# Patient Record
Sex: MALE | Race: WHITE | Employment: OTHER | URBAN - METROPOLITAN AREA
[De-identification: names, ages, dates, MRNs, and addresses within clinical notes are randomized per-mention and may not be internally consistent; named-entity substitution may affect disease eponyms.]

---

## 2019-07-24 ENCOUNTER — APPOINTMENT (OUTPATIENT)
Dept: CT IMAGING | Age: 59
End: 2019-07-24
Payer: MEDICAID

## 2019-07-24 ENCOUNTER — APPOINTMENT (OUTPATIENT)
Dept: GENERAL RADIOLOGY | Age: 59
End: 2019-07-24
Payer: MEDICAID

## 2019-07-24 ENCOUNTER — APPOINTMENT (OUTPATIENT)
Dept: MRI IMAGING | Age: 59
End: 2019-07-24
Payer: MEDICAID

## 2019-07-24 ENCOUNTER — HOSPITAL ENCOUNTER (EMERGENCY)
Age: 59
Discharge: HOME | End: 2019-07-24
Payer: MEDICAID

## 2019-07-24 VITALS
TEMPERATURE: 98.5 F | HEIGHT: 64 IN | WEIGHT: 183 LBS | BODY MASS INDEX: 31.24 KG/M2 | DIASTOLIC BLOOD PRESSURE: 76 MMHG | SYSTOLIC BLOOD PRESSURE: 115 MMHG | OXYGEN SATURATION: 94 % | RESPIRATION RATE: 18 BRPM | HEART RATE: 69 BPM
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
## 2019-07-24 DIAGNOSIS — S01.81XA FACIAL LACERATION, INITIAL ENCOUNTER: ICD-10-CM

## 2019-07-24 DIAGNOSIS — R93.0 ABNORMAL MRA, HEAD: ICD-10-CM

## 2019-07-24 DIAGNOSIS — M79.601 ARM PAIN, RIGHT: ICD-10-CM

## 2019-07-24 DIAGNOSIS — J34.89 MASS OF SINUS: ICD-10-CM

## 2019-07-24 LAB
ANION GAP SERPL CALCULATED.3IONS-SCNC: 9 MMOL/L (ref 4–16)
BASOPHILS ABSOLUTE: 0.1 K/CU MM
BASOPHILS RELATIVE PERCENT: 0.7 % (ref 0–1)
BUN BLDV-MCNC: 12 MG/DL (ref 6–23)
CALCIUM SERPL-MCNC: 8.9 MG/DL (ref 8.3–10.6)
CHLORIDE BLD-SCNC: 104 MMOL/L (ref 99–110)
CO2: 25 MMOL/L (ref 21–32)
CREAT SERPL-MCNC: 0.9 MG/DL (ref 0.9–1.3)
DIFFERENTIAL TYPE: ABNORMAL
EOSINOPHILS ABSOLUTE: 0.1 K/CU MM
EOSINOPHILS RELATIVE PERCENT: 1 % (ref 0–3)
GFR AFRICAN AMERICAN: >60 ML/MIN/1.73M2
GFR NON-AFRICAN AMERICAN: >60 ML/MIN/1.73M2
GLUCOSE BLD-MCNC: 106 MG/DL (ref 70–99)
HCT VFR BLD CALC: 41.2 % (ref 42–52)
HEMOGLOBIN: 13.7 GM/DL (ref 13.5–18)
IMMATURE NEUTROPHIL %: 0.3 % (ref 0–0.43)
LYMPHOCYTES ABSOLUTE: 1.4 K/CU MM
LYMPHOCYTES RELATIVE PERCENT: 15.9 % (ref 24–44)
MCH RBC QN AUTO: 29.8 PG (ref 27–31)
MCHC RBC AUTO-ENTMCNC: 33.3 % (ref 32–36)
MCV RBC AUTO: 89.6 FL (ref 78–100)
MONOCYTES ABSOLUTE: 0.6 K/CU MM
MONOCYTES RELATIVE PERCENT: 6.3 % (ref 0–4)
NUCLEATED RBC %: 0 %
PDW BLD-RTO: 14.4 % (ref 11.7–14.9)
PLATELET # BLD: 274 K/CU MM (ref 140–440)
PMV BLD AUTO: 10.8 FL (ref 7.5–11.1)
POTASSIUM SERPL-SCNC: 4.3 MMOL/L (ref 3.5–5.1)
RBC # BLD: 4.6 M/CU MM (ref 4.6–6.2)
SEGMENTED NEUTROPHILS ABSOLUTE COUNT: 6.8 K/CU MM
SEGMENTED NEUTROPHILS RELATIVE PERCENT: 75.8 % (ref 36–66)
SODIUM BLD-SCNC: 138 MMOL/L (ref 135–145)
TOTAL IMMATURE NEUTOROPHIL: 0.03 K/CU MM
TOTAL NUCLEATED RBC: 0 K/CU MM
WBC # BLD: 9 K/CU MM (ref 4–10.5)

## 2019-07-24 PROCEDURE — 73501 X-RAY EXAM HIP UNI 1 VIEW: CPT

## 2019-07-24 PROCEDURE — 36415 COLL VENOUS BLD VENIPUNCTURE: CPT

## 2019-07-24 PROCEDURE — 73060 X-RAY EXAM OF HUMERUS: CPT

## 2019-07-24 PROCEDURE — 85025 COMPLETE CBC W/AUTO DIFF WBC: CPT

## 2019-07-24 PROCEDURE — 70553 MRI BRAIN STEM W/O & W/DYE: CPT

## 2019-07-24 PROCEDURE — 6360000004 HC RX CONTRAST MEDICATION: Performed by: PHYSICIAN ASSISTANT

## 2019-07-24 PROCEDURE — 70450 CT HEAD/BRAIN W/O DYE: CPT

## 2019-07-24 PROCEDURE — 70544 MR ANGIOGRAPHY HEAD W/O DYE: CPT

## 2019-07-24 PROCEDURE — 99284 EMERGENCY DEPT VISIT MOD MDM: CPT

## 2019-07-24 PROCEDURE — 73080 X-RAY EXAM OF ELBOW: CPT

## 2019-07-24 PROCEDURE — 72125 CT NECK SPINE W/O DYE: CPT

## 2019-07-24 PROCEDURE — 80048 BASIC METABOLIC PNL TOTAL CA: CPT

## 2019-07-24 PROCEDURE — 6370000000 HC RX 637 (ALT 250 FOR IP): Performed by: PHYSICIAN ASSISTANT

## 2019-07-24 PROCEDURE — A9579 GAD-BASE MR CONTRAST NOS,1ML: HCPCS | Performed by: PHYSICIAN ASSISTANT

## 2019-07-24 RX ORDER — ACETAMINOPHEN 500 MG
1000 TABLET ORAL ONCE
Status: COMPLETED | OUTPATIENT
Start: 2019-07-24 | End: 2019-07-24

## 2019-07-24 RX ADMIN — ACETAMINOPHEN 1000 MG: 500 TABLET ORAL at 09:40

## 2019-07-24 RX ADMIN — GADOTERIDOL 16 ML: 279.3 INJECTION, SOLUTION INTRAVENOUS at 12:25

## 2019-07-24 ASSESSMENT — PAIN DESCRIPTION - LOCATION: LOCATION: HIP;ARM

## 2019-07-24 ASSESSMENT — PAIN DESCRIPTION - PAIN TYPE: TYPE: ACUTE PAIN

## 2019-07-24 ASSESSMENT — PAIN SCALES - GENERAL
PAINLEVEL_OUTOF10: 4
PAINLEVEL_OUTOF10: 3
PAINLEVEL_OUTOF10: 3

## 2019-07-24 ASSESSMENT — PAIN DESCRIPTION - ORIENTATION: ORIENTATION: RIGHT

## 2019-07-24 NOTE — ED TRIAGE NOTES
Patient to ER today after slipping out of his bed onto the floor. Patient had a previous stroke roughly a year ago, leaving him with left side deficits. Patient states he does not ambulate. Patient c/o new right upper arm pain, right hip pain, and worsening left ankle pain. Reports hitting his head, but denies loc or blood thinner usage. Patient is A/Ox4.  Resides at [address] [facility]. facility

## 2019-07-24 NOTE — ED PROVIDER NOTES
EMERGENCY DEPARTMENT ENCOUNTER      PCP: No primary care provider on file. CHIEF COMPLAINT    Chief Complaint   Patient presents with   Mercedes Chadwick     states slipped off his bed, hitting head. denies loc or blood thinner use. reports right upper arm pain, right hip pain, and left ankle pain       HPI    Mery Laura is a 60 y.o. male who presents via EMS after sustaining a fall from bed from his long-term care facility. Patient states that he slipped off his bed causing him to hit his head on a side table, does have a small superficial abrasion/laceration to the forehead. Denies being knocked unconscious. No altered mental status or confusion. Not on blood thinning medication. Denies neck pain. No other blood from the nose ears or mouth. No visual disturbances. No neck pain. His chief complaint is right upper arm pain into the elbow area. No chest wall pain, denies any preceding symptoms. Denies abdominal pain. No pelvic pain. No pain to the lower extremity. Rates pain 1/10. Alert and oriented. REVIEW OF SYSTEMS    General: No Fever  ENT:  No visual changes. No headache. Cardiac: No Chest Pain, no syncope  Respiratory: No cough or difficulty breathing  GI: No vomiting. No Bloody Stool or Diarrhea  : No Dysuria or Hematuria  MSKTL:  See HPI. Skin:  Denies rash  Neurologic:  Denies headache, focal weakness or sensory changes   Endocrine:  Denies polyuria or polydypsia   Lymphatic:  Denies swollen glands   See HPI and nursing notes for additional information     PAST MEDICAL & SURGICAL HISTORY    Past Medical History:   Diagnosis Date    Cerebral artery occlusion with cerebral infarction [facility])      History reviewed. No pertinent surgical history.     CURRENT MEDICATIONS        ALLERGIES    No Known Allergies    SOCIAL & FAMILY HISTORY    Social History     Socioeconomic History    Marital status:      Spouse name: None    Number of children: None    Years of education: None    Elbow Right (min 3 Views)    Result Date: 7/24/2019  EXAMINATION: TWO XRAY VIEWS OF THE RIGHT HUMERUS; 3 XRAY VIEWS OF THE RIGHT ELBOW 7/24/2019 9:48 am COMPARISON: None. HISTORY: ORDERING SYSTEM PROVIDED HISTORY: right arm pain TECHNOLOGIST PROVIDED HISTORY: Reason for exam:->right arm pain Reason for Exam: right humerus pain Acuity: Acute Type of Exam: Initial Mechanism of Injury: fell out of bed; ORDERING SYSTEM PROVIDED HISTORY: right elbow pain from fall TECHNOLOGIST PROVIDED HISTORY: Reason for exam:->right elbow pain from fall Reason for Exam: right elbow pain Acuity: Acute Type of Exam: Initial Mechanism of Injury: fell out of bed FINDINGS: No acute fracture or subluxation. The humerus is intact. No evidence of an elbow joint effusion. No acute osseous abnormality. Ct Cervical Spine Wo Contrast    Result Date: 7/24/2019  EXAMINATION: CT OF THE CERVICAL SPINE WITHOUT CONTRAST 7/24/2019 9:47 am TECHNIQUE: CT of the cervical spine was performed without the administration of intravenous contrast. Multiplanar reformatted images are provided for review. Dose modulation, iterative reconstruction, and/or weight based adjustment of the mA/kV was utilized to reduce the radiation dose to as low as reasonably achievable. COMPARISON: CT brain without contrast 07/24/2019 HISTORY: ORDERING SYSTEM PROVIDED HISTORY: fall from bed TECHNOLOGIST PROVIDED HISTORY: Reason for Exam: fall from bed Acuity: Acute Type of Exam: Initial FINDINGS: BONES/ALIGNMENT: Decreased bone mineral density. Normal lordotic curvature and alignment. The T1 and T2 vertebral bodies demonstrate mild anterior wedge configuration with no significant posterior cortical displacement or vertebral body sclerosis. The cervical vertebral bodies demonstrate normal configuration. There is multilevel degenerative disc and joint disease. No acute cervical spine fracture or acute fracture margins of the T1 and T2 bodies.  SOFT TISSUES: The visualized

## 2019-07-24 NOTE — ED NOTES
Discharge instructions reviewed with patient. Medications and follow up were discussed. Patient denies further questions and verbalizes understanding.  Setting up transport back to [facility]  07/24/19 1802

## 2024-01-06 ENCOUNTER — APPOINTMENT (OUTPATIENT)
Dept: GENERAL RADIOLOGY | Age: 64
End: 2024-01-06
Payer: COMMERCIAL

## 2024-01-06 ENCOUNTER — APPOINTMENT (OUTPATIENT)
Dept: CT IMAGING | Age: 64
End: 2024-01-06
Payer: COMMERCIAL

## 2024-01-06 ENCOUNTER — HOSPITAL ENCOUNTER (INPATIENT)
Age: 64
LOS: 13 days | Discharge: NURSING, REHAB OR LONG-TERM CARE | End: 2024-01-19
Attending: STUDENT IN AN ORGANIZED HEALTH CARE EDUCATION/TRAINING PROGRAM | Admitting: STUDENT IN AN ORGANIZED HEALTH CARE EDUCATION/TRAINING PROGRAM
Payer: COMMERCIAL

## 2024-01-06 DIAGNOSIS — N30.01 ACUTE CYSTITIS WITH HEMATURIA: ICD-10-CM

## 2024-01-06 DIAGNOSIS — A41.9 SEPTICEMIA (HCC): Primary | ICD-10-CM

## 2024-01-06 DIAGNOSIS — G89.3 TUMOR ASSOCIATED PAIN: ICD-10-CM

## 2024-01-06 DIAGNOSIS — R53.1 GENERALIZED WEAKNESS: ICD-10-CM

## 2024-01-06 DIAGNOSIS — C79.9 METASTATIC MALIGNANT NEOPLASM, UNSPECIFIED SITE (HCC): ICD-10-CM

## 2024-01-06 DIAGNOSIS — C80.1 NEOPLASM /CANCER (HCC): ICD-10-CM

## 2024-01-06 DIAGNOSIS — R07.9 CHEST PAIN, UNSPECIFIED TYPE: ICD-10-CM

## 2024-01-06 LAB
ALBUMIN SERPL-MCNC: 3 GM/DL (ref 3.4–5)
ALP BLD-CCNC: 110 IU/L (ref 40–128)
ALT SERPL-CCNC: 12 U/L (ref 10–40)
ANION GAP SERPL CALCULATED.3IONS-SCNC: 12 MMOL/L (ref 7–16)
AST SERPL-CCNC: 21 IU/L (ref 15–37)
BACTERIA: NEGATIVE /HPF
BASOPHILS ABSOLUTE: 0 K/CU MM
BASOPHILS RELATIVE PERCENT: 0.3 % (ref 0–1)
BILIRUB SERPL-MCNC: 0.4 MG/DL (ref 0–1)
BILIRUBIN URINE: ABNORMAL MG/DL
BLOOD, URINE: ABNORMAL
BUN SERPL-MCNC: 15 MG/DL (ref 6–23)
CALCIUM SERPL-MCNC: 9.1 MG/DL (ref 8.3–10.6)
CHLORIDE BLD-SCNC: 101 MMOL/L (ref 99–110)
CLARITY: CLEAR
CO2: 26 MMOL/L (ref 21–32)
COLOR: YELLOW
CREAT SERPL-MCNC: 0.5 MG/DL (ref 0.9–1.3)
DIFFERENTIAL TYPE: ABNORMAL
EKG ATRIAL RATE: 121 BPM
EKG DIAGNOSIS: NORMAL
EKG P AXIS: 52 DEGREES
EKG P-R INTERVAL: 144 MS
EKG Q-T INTERVAL: 336 MS
EKG QRS DURATION: 80 MS
EKG QTC CALCULATION (BAZETT): 477 MS
EKG R AXIS: -5 DEGREES
EKG T AXIS: 55 DEGREES
EKG VENTRICULAR RATE: 121 BPM
EOSINOPHILS ABSOLUTE: 0 K/CU MM
EOSINOPHILS RELATIVE PERCENT: 0.1 % (ref 0–3)
GFR SERPL CREATININE-BSD FRML MDRD: >60 ML/MIN/1.73M2
GLUCOSE SERPL-MCNC: 113 MG/DL (ref 70–99)
GLUCOSE, URINE: NEGATIVE MG/DL
HCT VFR BLD CALC: 33.1 % (ref 42–52)
HCT VFR BLD CALC: 39 % (ref 42–52)
HEMOGLOBIN: 10.3 GM/DL (ref 13.5–18)
HEMOGLOBIN: 12.3 GM/DL (ref 13.5–18)
IMMATURE NEUTROPHIL %: 0.3 % (ref 0–0.43)
KETONES, URINE: ABNORMAL MG/DL
LACTIC ACID, SEPSIS: 1.2 MMOL/L (ref 0.4–2)
LACTIC ACID, SEPSIS: 1.6 MMOL/L (ref 0.4–2)
LEUKOCYTE ESTERASE, URINE: ABNORMAL
LIPASE: 15 IU/L (ref 13–60)
LYMPHOCYTES ABSOLUTE: 1.7 K/CU MM
LYMPHOCYTES RELATIVE PERCENT: 10.7 % (ref 24–44)
MAGNESIUM: 2.1 MG/DL (ref 1.8–2.4)
MCH RBC QN AUTO: 27.8 PG (ref 27–31)
MCHC RBC AUTO-ENTMCNC: 31.5 % (ref 32–36)
MCV RBC AUTO: 88.2 FL (ref 78–100)
MONOCYTES ABSOLUTE: 1 K/CU MM
MONOCYTES RELATIVE PERCENT: 6.3 % (ref 0–4)
MUCUS: ABNORMAL HPF
NITRITE URINE, QUANTITATIVE: NEGATIVE
NUCLEATED RBC %: 0 %
PDW BLD-RTO: 15.3 % (ref 11.7–14.9)
PH, URINE: 5.5 (ref 5–8)
PLATELET # BLD: 523 K/CU MM (ref 140–440)
PMV BLD AUTO: 10.2 FL (ref 7.5–11.1)
POTASSIUM SERPL-SCNC: 3.8 MMOL/L (ref 3.5–5.1)
PRO-BNP: 50.36 PG/ML
PROTEIN UA: ABNORMAL MG/DL
RBC # BLD: 4.42 M/CU MM (ref 4.6–6.2)
RBC URINE: 8 /HPF (ref 0–3)
SARS-COV-2 RDRP RESP QL NAA+PROBE: NOT DETECTED
SEGMENTED NEUTROPHILS ABSOLUTE COUNT: 13.1 K/CU MM
SEGMENTED NEUTROPHILS RELATIVE PERCENT: 82.3 % (ref 36–66)
SODIUM BLD-SCNC: 139 MMOL/L (ref 135–145)
SOURCE: NORMAL
SPECIFIC GRAVITY UA: 1.02 (ref 1–1.03)
TOTAL IMMATURE NEUTOROPHIL: 0.05 K/CU MM
TOTAL NUCLEATED RBC: 0 K/CU MM
TOTAL PROTEIN: 7.3 GM/DL (ref 6.4–8.2)
TRICHOMONAS: ABNORMAL /HPF
TROPONIN, HIGH SENSITIVITY: 16 NG/L (ref 0–22)
TROPONIN, HIGH SENSITIVITY: 24 NG/L (ref 0–22)
UROBILINOGEN, URINE: 0.2 MG/DL (ref 0.2–1)
WBC # BLD: 15.9 K/CU MM (ref 4–10.5)
WBC UA: 37 /HPF (ref 0–2)

## 2024-01-06 PROCEDURE — 71045 X-RAY EXAM CHEST 1 VIEW: CPT

## 2024-01-06 PROCEDURE — 2580000003 HC RX 258: Performed by: NURSE PRACTITIONER

## 2024-01-06 PROCEDURE — 81001 URINALYSIS AUTO W/SCOPE: CPT

## 2024-01-06 PROCEDURE — 83605 ASSAY OF LACTIC ACID: CPT

## 2024-01-06 PROCEDURE — 6360000002 HC RX W HCPCS: Performed by: NURSE PRACTITIONER

## 2024-01-06 PROCEDURE — 1200000000 HC SEMI PRIVATE

## 2024-01-06 PROCEDURE — 6360000004 HC RX CONTRAST MEDICATION: Performed by: NURSE PRACTITIONER

## 2024-01-06 PROCEDURE — 85014 HEMATOCRIT: CPT

## 2024-01-06 PROCEDURE — 74177 CT ABD & PELVIS W/CONTRAST: CPT

## 2024-01-06 PROCEDURE — 83735 ASSAY OF MAGNESIUM: CPT

## 2024-01-06 PROCEDURE — 87086 URINE CULTURE/COLONY COUNT: CPT

## 2024-01-06 PROCEDURE — 84484 ASSAY OF TROPONIN QUANT: CPT

## 2024-01-06 PROCEDURE — 93005 ELECTROCARDIOGRAM TRACING: CPT | Performed by: NURSE PRACTITIONER

## 2024-01-06 PROCEDURE — 87635 SARS-COV-2 COVID-19 AMP PRB: CPT

## 2024-01-06 PROCEDURE — 99285 EMERGENCY DEPT VISIT HI MDM: CPT

## 2024-01-06 PROCEDURE — 96365 THER/PROPH/DIAG IV INF INIT: CPT

## 2024-01-06 PROCEDURE — 6360000002 HC RX W HCPCS: Performed by: STUDENT IN AN ORGANIZED HEALTH CARE EDUCATION/TRAINING PROGRAM

## 2024-01-06 PROCEDURE — 85018 HEMOGLOBIN: CPT

## 2024-01-06 PROCEDURE — 80053 COMPREHEN METABOLIC PANEL: CPT

## 2024-01-06 PROCEDURE — 36415 COLL VENOUS BLD VENIPUNCTURE: CPT

## 2024-01-06 PROCEDURE — 6370000000 HC RX 637 (ALT 250 FOR IP): Performed by: NURSE PRACTITIONER

## 2024-01-06 PROCEDURE — 6370000000 HC RX 637 (ALT 250 FOR IP): Performed by: STUDENT IN AN ORGANIZED HEALTH CARE EDUCATION/TRAINING PROGRAM

## 2024-01-06 PROCEDURE — 83690 ASSAY OF LIPASE: CPT

## 2024-01-06 PROCEDURE — 83880 ASSAY OF NATRIURETIC PEPTIDE: CPT

## 2024-01-06 PROCEDURE — 93010 ELECTROCARDIOGRAM REPORT: CPT | Performed by: INTERNAL MEDICINE

## 2024-01-06 PROCEDURE — P9612 CATHETERIZE FOR URINE SPEC: HCPCS

## 2024-01-06 PROCEDURE — 85025 COMPLETE CBC W/AUTO DIFF WBC: CPT

## 2024-01-06 PROCEDURE — 51702 INSERT TEMP BLADDER CATH: CPT

## 2024-01-06 PROCEDURE — 2580000003 HC RX 258: Performed by: STUDENT IN AN ORGANIZED HEALTH CARE EDUCATION/TRAINING PROGRAM

## 2024-01-06 PROCEDURE — 87040 BLOOD CULTURE FOR BACTERIA: CPT

## 2024-01-06 RX ORDER — SODIUM CHLORIDE 0.9 % (FLUSH) 0.9 %
5-40 SYRINGE (ML) INJECTION PRN
Status: DISCONTINUED | OUTPATIENT
Start: 2024-01-06 | End: 2024-01-19 | Stop reason: HOSPADM

## 2024-01-06 RX ORDER — ONDANSETRON 2 MG/ML
4 INJECTION INTRAMUSCULAR; INTRAVENOUS EVERY 6 HOURS PRN
Status: DISCONTINUED | OUTPATIENT
Start: 2024-01-06 | End: 2024-01-19 | Stop reason: HOSPADM

## 2024-01-06 RX ORDER — ONDANSETRON 4 MG/1
4 TABLET, ORALLY DISINTEGRATING ORAL EVERY 8 HOURS PRN
Status: DISCONTINUED | OUTPATIENT
Start: 2024-01-06 | End: 2024-01-19 | Stop reason: HOSPADM

## 2024-01-06 RX ORDER — SODIUM CHLORIDE, SODIUM LACTATE, POTASSIUM CHLORIDE, CALCIUM CHLORIDE 600; 310; 30; 20 MG/100ML; MG/100ML; MG/100ML; MG/100ML
1000 INJECTION, SOLUTION INTRAVENOUS CONTINUOUS
Status: DISCONTINUED | OUTPATIENT
Start: 2024-01-06 | End: 2024-01-06

## 2024-01-06 RX ORDER — OXYCODONE HYDROCHLORIDE AND ACETAMINOPHEN 5; 325 MG/1; MG/1
1 TABLET ORAL
Status: COMPLETED | OUTPATIENT
Start: 2024-01-06 | End: 2024-01-06

## 2024-01-06 RX ORDER — SODIUM CHLORIDE, SODIUM LACTATE, POTASSIUM CHLORIDE, CALCIUM CHLORIDE 600; 310; 30; 20 MG/100ML; MG/100ML; MG/100ML; MG/100ML
INJECTION, SOLUTION INTRAVENOUS CONTINUOUS
Status: DISCONTINUED | OUTPATIENT
Start: 2024-01-06 | End: 2024-01-07

## 2024-01-06 RX ORDER — ACETAMINOPHEN 325 MG/1
650 TABLET ORAL EVERY 6 HOURS PRN
Status: DISCONTINUED | OUTPATIENT
Start: 2024-01-06 | End: 2024-01-19 | Stop reason: HOSPADM

## 2024-01-06 RX ORDER — SODIUM CHLORIDE 9 MG/ML
500 INJECTION, SOLUTION INTRAVENOUS PRN
Status: DISCONTINUED | OUTPATIENT
Start: 2024-01-06 | End: 2024-01-19 | Stop reason: HOSPADM

## 2024-01-06 RX ORDER — SODIUM CHLORIDE 0.9 % (FLUSH) 0.9 %
5-40 SYRINGE (ML) INJECTION EVERY 12 HOURS SCHEDULED
Status: DISCONTINUED | OUTPATIENT
Start: 2024-01-06 | End: 2024-01-19 | Stop reason: HOSPADM

## 2024-01-06 RX ORDER — 0.9 % SODIUM CHLORIDE 0.9 %
30 INTRAVENOUS SOLUTION INTRAVENOUS ONCE
Status: COMPLETED | OUTPATIENT
Start: 2024-01-06 | End: 2024-01-06

## 2024-01-06 RX ORDER — ACETAMINOPHEN 650 MG/1
650 SUPPOSITORY RECTAL EVERY 6 HOURS PRN
Status: DISCONTINUED | OUTPATIENT
Start: 2024-01-06 | End: 2024-01-19 | Stop reason: HOSPADM

## 2024-01-06 RX ORDER — ENOXAPARIN SODIUM 100 MG/ML
40 INJECTION SUBCUTANEOUS EVERY EVENING
Status: DISCONTINUED | OUTPATIENT
Start: 2024-01-06 | End: 2024-01-09

## 2024-01-06 RX ORDER — HYDROCODONE BITARTRATE AND ACETAMINOPHEN 5; 325 MG/1; MG/1
1 TABLET ORAL EVERY 6 HOURS PRN
Status: DISCONTINUED | OUTPATIENT
Start: 2024-01-06 | End: 2024-01-07

## 2024-01-06 RX ORDER — POLYETHYLENE GLYCOL 3350 17 G/17G
17 POWDER, FOR SOLUTION ORAL DAILY PRN
Status: DISCONTINUED | OUTPATIENT
Start: 2024-01-06 | End: 2024-01-08

## 2024-01-06 RX ADMIN — SODIUM CHLORIDE 1905 ML: 9 INJECTION, SOLUTION INTRAVENOUS at 14:22

## 2024-01-06 RX ADMIN — SODIUM CHLORIDE, POTASSIUM CHLORIDE, SODIUM LACTATE AND CALCIUM CHLORIDE 1000 ML: 600; 310; 30; 20 INJECTION, SOLUTION INTRAVENOUS at 17:21

## 2024-01-06 RX ADMIN — IOPAMIDOL 75 ML: 755 INJECTION, SOLUTION INTRAVENOUS at 12:47

## 2024-01-06 RX ADMIN — CEFTRIAXONE 1000 MG: 1 INJECTION, POWDER, FOR SOLUTION INTRAMUSCULAR; INTRAVENOUS at 14:22

## 2024-01-06 RX ADMIN — OXYCODONE HYDROCHLORIDE AND ACETAMINOPHEN 1 TABLET: 5; 325 TABLET ORAL at 11:35

## 2024-01-06 RX ADMIN — HYDROCODONE BITARTRATE AND ACETAMINOPHEN 1 TABLET: 5; 325 TABLET ORAL at 19:16

## 2024-01-06 RX ADMIN — ENOXAPARIN SODIUM 40 MG: 100 INJECTION SUBCUTANEOUS at 18:29

## 2024-01-06 RX ADMIN — SODIUM CHLORIDE, POTASSIUM CHLORIDE, SODIUM LACTATE AND CALCIUM CHLORIDE: 600; 310; 30; 20 INJECTION, SOLUTION INTRAVENOUS at 21:31

## 2024-01-06 ASSESSMENT — PAIN - FUNCTIONAL ASSESSMENT
PAIN_FUNCTIONAL_ASSESSMENT: PREVENTS OR INTERFERES WITH MANY ACTIVE NOT PASSIVE ACTIVITIES
PAIN_FUNCTIONAL_ASSESSMENT: 0-10

## 2024-01-06 ASSESSMENT — PAIN DESCRIPTION - DESCRIPTORS
DESCRIPTORS: GNAWING;DISCOMFORT
DESCRIPTORS: ACHING

## 2024-01-06 ASSESSMENT — PAIN SCALES - GENERAL
PAINLEVEL_OUTOF10: 9
PAINLEVEL_OUTOF10: 10
PAINLEVEL_OUTOF10: 0
PAINLEVEL_OUTOF10: 9

## 2024-01-06 ASSESSMENT — PAIN DESCRIPTION - ORIENTATION
ORIENTATION: RIGHT;MID;LOWER
ORIENTATION: LOWER
ORIENTATION: LOWER

## 2024-01-06 ASSESSMENT — PAIN DESCRIPTION - LOCATION
LOCATION: BACK;LEG
LOCATION: BACK
LOCATION: BACK

## 2024-01-06 NOTE — PROGRESS NOTES
4 Eyes Skin Assessment     NAME:  Sebastian Perkins  YOB: 1960  MEDICAL RECORD NUMBER:  [id number]    The patient is being assessed for  Admission    I agree that at least one RN has performed a thorough Head to Toe Skin Assessment on the patient. ALL assessment sites listed below have been assessed.      Areas assessed by both nurses:    Head, Face, Ears, Shoulders, Back, Chest, Arms, Elbows, Hands, Sacrum. Buttock, Coccyx, Ischium, Legs. Feet and Heels, and Under Medical Devices         Does the Patient have a Wound? No noted wound(s)   Dry, flaky skin. RLE abrasion.       Domenico Prevention initiated by RN: Yes  Wound Care Orders initiated by RN: No    Pressure Injury (Stage 3,4, Unstageable, DTI, NWPT, and Complex wounds) if present, place Wound referral order by RN under : No    New Ostomies, if present place, Ostomy referral order under : No     Nurse 1 eSignature: Electronically signed by Esther Morin RN on 1/6/24 at 4:53 PM EST    **SHARE this note so that the co-signing nurse can place an eSignature**    Nurse 2 eSignature: Electronically signed by Celine Acevedo RN on 1/6/24 at 5:39 PM EST

## 2024-01-06 NOTE — ED PROVIDER NOTES
in the HPI.    REVIEW OF SYSTEMS     Pertinent ROS as noted in HPI.      PAST MEDICAL HISTORY     Past Medical History:   Diagnosis Date    Cerebral artery occlusion with cerebral infarction (HCC)        SURGICAL HISTORY   History reviewed. No pertinent surgical history.    CURRENTMEDICATIONS       There are no discharge medications for this patient.      ALLERGIES     Patient has no known allergies.    FAMILYHISTORY     History reviewed. No pertinent family history.     SOCIAL HISTORY       Social History     Socioeconomic History    Marital status:      Spouse name: None    Number of children: None    Years of education: None    Highest education level: None   Tobacco Use    Smoking status: Every Day     Current packs/day: 0.25     Types: Cigarettes    Smokeless tobacco: Never   Substance and Sexual Activity    Alcohol use: Not Currently    Drug use: Never       SCREENINGS    [place] Coma Scale  Eye Opening: Spontaneous  Best Verbal Response: Oriented  Best Motor Response: Obeys commands  Sergio Coma Scale Score: 15      PHYSICAL EXAM       ED Triage Vitals   BP Temp Temp Source Pulse Respirations SpO2 Height Weight - Scale   01/06/24 1008 01/06/24 1008 01/06/24 1008 01/06/24 1008 01/06/24 1008 01/06/24 1008 01/06/24 1010 01/06/24 1132   (!) 154/102 97.9 °F (36.6 °C) Oral (!) 121 16 93 % 1.626 m (5' 4\") 63.5 kg (140 lb)        Constitutional:  Well developed, Well nourished.  No distress  HENT:  Normocephalic, Atraumatic.  Moist mucus membranes.    Neck/Lymphatics: supple, no swollen nodes  Cardiovascular:   Tachycardic, RRR, no murmurs/rubs/gallops.  Distal cap refill and pulses intact bilateral upper and lower extremities.  no peripheral edema.    Respiratory:   Nonlabored breathing.  Normal breath sounds, No wheezing  Abdomen:  Bowel sounds normal, Soft, mild diffuse lower abdominal tenderness, no masses.  No CVA tenderness  Integument:   Warm, Dry, No rashes.  Musculoskeletal: Cool, thoracic, lumbar  midline tenderness.  There is mild paravertebral tenderness over the lumbar region bilaterally which patient states is baseline.  No palpable tenderness over the left shoulder musculature or bones.  There is limited range of motion of the shoulder secondary to prior hemiparesis which patient states is baseline.  No localized bony tenderness over the left hip.  There is increased pain with flexion or abduction.    Neurologic:  Alert & oriented x4, No focal deficits noted.   Has moderate dysarthria and left-sided facial droop which is baseline.  There is left ptosis.  There is 3/5 strength on the left upper and lower extremity and 5/5 on the right upper and lower extremities.  Psychiatric:  Affect normal, Mood normal.     DIAGNOSTIC RESULTS   LABS:    Labs Reviewed   CBC WITH AUTO DIFFERENTIAL - Abnormal; Notable for the following components:       Result Value    WBC 15.9 (*)     RBC 4.42 (*)     Hemoglobin 12.3 (*)     Hematocrit 39.0 (*)     MCHC 31.5 (*)     RDW 15.3 (*)     Platelets 523 (*)     Segs Relative 82.3 (*)     Lymphocytes % 10.7 (*)     Monocytes % 6.3 (*)     All other components within normal limits   COMPREHENSIVE METABOLIC PANEL - Abnormal; Notable for the following components:    Glucose 113 (*)     Creatinine 0.5 (*)     Albumin 3.0 (*)     All other components within normal limits   URINALYSIS - Abnormal; Notable for the following components:    Bilirubin Urine SMALL NUMBER OR AMOUNT OBSERVED (*)     Ketones, Urine TRACE (*)     Blood, Urine LARGE NUMBER OR AMOUNT OBSERVED (*)     Protein, UA TRACE (*)     Leukocyte Esterase, Urine SMALL NUMBER OR AMOUNT OBSERVED (*)     All other components within normal limits   TROPONIN - Abnormal; Notable for the following components:    Troponin, High Sensitivity 24 (*)     All other components within normal limits   MICROSCOPIC URINALYSIS - Abnormal; Notable for the following components:    RBC, UA 8 (*)     WBC, UA 37 (*)     Mucus, UA RARE (*)     All

## 2024-01-06 NOTE — ED NOTES
ED TO INPATIENT SBAR HANDOFF    Patient Name: Sebastian Perkins   :  1960  63 y.o.   Preferred Name  Sebastian  Family/Caregiver Present no   Restraints no   C-SSRS: Risk of Suicide: No Risk  Sitter no   Sepsis Risk Score Sepsis Risk Score: 3.5      Situation  Chief Complaint   Patient presents with    Back Pain     Low back pain from nursing home. Reports chronic pain but worse today. Reports a hard time stand d/t pain     Shoulder Pain     Left shoulder, chronic pain but worse today. NKI     Brief Description of Patient's Condition: Back pain, shoulder pain  Mental Status: alert  Arrived from: nursing home    Imaging:   CT ABDOMEN PELVIS W IV CONTRAST Additional Contrast? None   Final Result   1. Metastatic disease of uncertain origin with lytic lesions in the right   pubic bone and left iliac wing, several suspicious hepatic lesions, and right   hilar lymphadenopathy versus perihilar pulmonary nodules.  Recommend further   evaluation with a CT of the chest.   2. 2.2 cm indeterminate right adrenal nodule.  Consider further evaluation   with adrenal protocol MRI or CT.   3. Moderate prostatomegaly with moderate urinary bladder distension.   Layering calculi within the urinary bladder lumen.   4. 1.5 cm indeterminate right renal cystic lesion.  Consider further   evaluation with a renal protocol MRI or CT.  Suspected post treatment changes   in the superior pole of the left kidney.  Recommend correlation with previous   intervention.      RECOMMENDATIONS:   2.2 cm right adrenal mass, probable benign adenoma. Recommend adrenal washout   CT or chemical shift MRI.      JACR 2017 Aug; 14(8):1038-44, JCAT 2016 Mar-Apr; 40(2):194-200, Urol J 2006   Spring; 3(2):71-4.      1.8 cm right solid pulmonary nodule detected on incomplete chest CT. Per   Fleischner Society Guidelines, recommend prompt non-contrast Chest CT for   further evaluation.      These guidelines do not apply to immunocompromised patients and patients with    Height:         PO Status: Nothing by Mouth  O2 Flow Rate:      Cardiac Rhythm: Sinus tachycardia Possible Inferior infarct  Last documented pain medication administered: 1135  NIH Score: NIH     Active LDA's:   Peripheral IV 01/06/24 Right Antecubital (Active)       Pertinent or High Risk Medications/Drips: no   If Yes, please provide details:   Blood Product Administration: no  If Yes, please provide details:     Recommendation    Incomplete orders yes  Additional Comments: pt on room air, incontinent, left sided weakness from past CVA.   If any further questions, please call Sending RN at [phone].    Electronically signed by: Electronically signed by Celina Kurtz RN on 1/6/2024 at 3:18 PM

## 2024-01-06 NOTE — H&P
V2.0  History and Physical      Name:  Sebastian Perkins /Age/Sex: 1960  (63 y.o. male)   MRN & CSN:  [id number] & [id number] Encounter Date/Time: 2024 3:08 PM EST   Location:  [address] [place] [address]-A PCP: No primary care provider on file.       Hospital Day: 1    Assessment and Plan:   Sebastian Perkins is a 63 y.o. male who presents with Weakness    Hospital Problems             Last Modified POA    * (Principal) Weakness 2024 Yes       Plan:    Metastatic disease of unknown origin  Worsening back and shoulder pain.  CT abdomen/pelvis on admission with lytic lesions in right pubic bone and left iliac wing, several suspicious hepatic lesions and right hilar lymphadenopathy versus perihilar pulmonary nodules.  Also an indeterminate 2.2 cm right adrenal nodule.  There is also a 1.5 cm indeterminate right renal cystic lesion.  -CT chest ordered for tomorrow as pt already received contrast today  -IVF hydration in setting of contrast  -Oncology consulted    UTI  Generalized weakness. Denies  -Continue Rocephin, follow urine cultures    Hx of CVA -residual left sided weakness    Med rec pending      Disposition:   Current Living situation: NH  Expected Disposition: NH  Estimated D/C: 2 days    Diet ADULT DIET; Regular   DVT Prophylaxis [x] Lovenox, []  Heparin, [] SCDs, [] Ambulation,  [] Eliquis, [] Xarelto, [] Coumadin   Code Status Full Code   Surrogate Decision Maker/ POA      Personally reviewed Lab Studies and Imaging     Discussed management of the case with ER provider who recommended admission    EKG interpreted personally and results sinus tachycardia.    Imaging that was interpreted personally includes CT abd/pelvis and results as above      History from:     patient, electronic medical record    History of Present Illness:     Chief Complaint:   Sebastian Perkins is a 63 y.o. male who presented after he was unable to sit up and fell back in bed.  Patient is limited with his mobility at baseline due to prior stroke  Meds: sodium chloride flush, 5-40 mL, PRN  sodium chloride, 500 mL, PRN  ondansetron, 4 mg, Q8H PRN   Or  ondansetron, 4 mg, Q6H PRN  polyethylene glycol, 17 g, Daily PRN  acetaminophen, 650 mg, Q6H PRN   Or  acetaminophen, 650 mg, Q6H PRN  HYDROcodone-acetaminophen, 1 tablet, Q6H PRN        Labs      CBC:   Recent Labs     01/06/24  1143   WBC 15.9*   HGB 12.3*   *     BMP:    Recent Labs     01/06/24  1143      K 3.8      CO2 26   BUN 15   CREATININE 0.5*   GLUCOSE 113*     Hepatic:   Recent Labs     01/06/24  1143   AST 21   ALT 12   BILITOT 0.4   ALKPHOS 110     Lipids: No results found for: \"CHOL\", \"HDL\", \"TRIG\"  Hemoglobin A1C: No results found for: \"LABA1C\"  TSH: No results found for: \"TSH\"  Troponin: No results found for: \"TROPONINT\"  Lactic Acid: No results for input(s): \"LACTA\" in the last 72 hours.  BNP:   Recent Labs     01/06/24  1143   PROBNP 50.36     UA:  Lab Results   Component Value Date/Time    NITRU NEGATIVE 01/06/2024 01:23 PM    COLORU YELLOW 01/06/2024 01:23 PM    WBCUA 37 01/06/2024 01:23 PM    RBCUA 8 01/06/2024 01:23 PM    MUCUS RARE 01/06/2024 01:23 PM    TRICHOMONAS NONE SEEN 01/06/2024 01:23 PM    BACTERIA NEGATIVE 01/06/2024 01:23 PM    CLARITYU CLEAR 01/06/2024 01:23 PM    SPECGRAV 1.025 01/06/2024 01:23 PM    LEUKOCYTESUR SMALL NUMBER OR AMOUNT OBSERVED 01/06/2024 01:23 PM    UROBILINOGEN 0.2 01/06/2024 01:23 PM    BILIRUBINUR SMALL NUMBER OR AMOUNT OBSERVED 01/06/2024 01:23 PM    BLOODU LARGE NUMBER OR AMOUNT OBSERVED 01/06/2024 01:23 PM    KETUA TRACE 01/06/2024 01:23 PM     Urine Cultures: No results found for: \"LABURIN\"  Blood Cultures: No results found for: \"BC\"  No results found for: \"BLOODCULT2\"  Organism: No results found for: \"ORG\"    Imaging/Diagnostics Last 24 Hours   CT ABDOMEN PELVIS W IV CONTRAST Additional Contrast? None    Result Date: 1/6/2024  EXAMINATION: CT OF THE ABDOMEN AND PELVIS WITH CONTRAST 1/6/2024 12:23 pm TECHNIQUE: CT of the abdomen and

## 2024-01-06 NOTE — ED PROVIDER NOTES
EKG   Sinus tachycardia rate of 121.   Possible inferior infarct age undetermined.     No evidence ischemia.     No previous EKG for comparison         Uvaldo Ferrer MD  01/06/24 8299

## 2024-01-06 NOTE — CARE COORDINATION
MCG criteria for Tachycardia reviewed at this time, criteria supports Inpatient Admission. BAM,RN/CM

## 2024-01-07 ENCOUNTER — APPOINTMENT (OUTPATIENT)
Dept: CT IMAGING | Age: 64
End: 2024-01-07
Payer: COMMERCIAL

## 2024-01-07 LAB
ALBUMIN SERPL-MCNC: 2.6 GM/DL (ref 3.4–5)
ALP BLD-CCNC: 95 IU/L (ref 40–128)
ALT SERPL-CCNC: 9 U/L (ref 10–40)
ANION GAP SERPL CALCULATED.3IONS-SCNC: 11 MMOL/L (ref 7–16)
AST SERPL-CCNC: 11 IU/L (ref 15–37)
BASOPHILS ABSOLUTE: 0 K/CU MM
BASOPHILS RELATIVE PERCENT: 0.3 % (ref 0–1)
BILIRUB SERPL-MCNC: 0.2 MG/DL (ref 0–1)
BUN SERPL-MCNC: 11 MG/DL (ref 6–23)
CALCIUM SERPL-MCNC: 8 MG/DL (ref 8.3–10.6)
CEA: 31.8 NG/ML (ref 0–5)
CHLORIDE BLD-SCNC: 103 MMOL/L (ref 99–110)
CO2: 25 MMOL/L (ref 21–32)
CREAT SERPL-MCNC: 0.5 MG/DL (ref 0.9–1.3)
DIFFERENTIAL TYPE: ABNORMAL
EOSINOPHILS ABSOLUTE: 0 K/CU MM
EOSINOPHILS RELATIVE PERCENT: 0.2 % (ref 0–3)
GFR SERPL CREATININE-BSD FRML MDRD: >60 ML/MIN/1.73M2
GLUCOSE SERPL-MCNC: 103 MG/DL (ref 70–99)
HCT VFR BLD CALC: 32.2 % (ref 42–52)
HEMOGLOBIN: 10.1 GM/DL (ref 13.5–18)
IMMATURE NEUTROPHIL %: 0.4 % (ref 0–0.43)
LYMPHOCYTES ABSOLUTE: 1.7 K/CU MM
LYMPHOCYTES RELATIVE PERCENT: 11.6 % (ref 24–44)
MCH RBC QN AUTO: 27.5 PG (ref 27–31)
MCHC RBC AUTO-ENTMCNC: 31.4 % (ref 32–36)
MCV RBC AUTO: 87.7 FL (ref 78–100)
MONOCYTES ABSOLUTE: 1.2 K/CU MM
MONOCYTES RELATIVE PERCENT: 8.5 % (ref 0–4)
NUCLEATED RBC %: 0 %
PDW BLD-RTO: 15.5 % (ref 11.7–14.9)
PLATELET # BLD: 444 K/CU MM (ref 140–440)
PMV BLD AUTO: 10.2 FL (ref 7.5–11.1)
POTASSIUM SERPL-SCNC: 3.6 MMOL/L (ref 3.5–5.1)
PSA ULTRASENSITIVE: 3.33 NG/ML (ref 0–4)
RBC # BLD: 3.67 M/CU MM (ref 4.6–6.2)
SEGMENTED NEUTROPHILS ABSOLUTE COUNT: 11.4 K/CU MM
SEGMENTED NEUTROPHILS RELATIVE PERCENT: 79 % (ref 36–66)
SODIUM BLD-SCNC: 139 MMOL/L (ref 135–145)
TOTAL IMMATURE NEUTOROPHIL: 0.06 K/CU MM
TOTAL NUCLEATED RBC: 0 K/CU MM
TOTAL PROTEIN: 5.2 GM/DL (ref 6.4–8.2)
WBC # BLD: 14.4 K/CU MM (ref 4–10.5)

## 2024-01-07 PROCEDURE — 97530 THERAPEUTIC ACTIVITIES: CPT

## 2024-01-07 PROCEDURE — 99254 IP/OBS CNSLTJ NEW/EST MOD 60: CPT | Performed by: INTERNAL MEDICINE

## 2024-01-07 PROCEDURE — 2580000003 HC RX 258: Performed by: STUDENT IN AN ORGANIZED HEALTH CARE EDUCATION/TRAINING PROGRAM

## 2024-01-07 PROCEDURE — 71260 CT THORAX DX C+: CPT

## 2024-01-07 PROCEDURE — 6370000000 HC RX 637 (ALT 250 FOR IP): Performed by: STUDENT IN AN ORGANIZED HEALTH CARE EDUCATION/TRAINING PROGRAM

## 2024-01-07 PROCEDURE — 36415 COLL VENOUS BLD VENIPUNCTURE: CPT

## 2024-01-07 PROCEDURE — 97166 OT EVAL MOD COMPLEX 45 MIN: CPT

## 2024-01-07 PROCEDURE — 82105 ALPHA-FETOPROTEIN SERUM: CPT

## 2024-01-07 PROCEDURE — 1200000000 HC SEMI PRIVATE

## 2024-01-07 PROCEDURE — 51798 US URINE CAPACITY MEASURE: CPT

## 2024-01-07 PROCEDURE — 97162 PT EVAL MOD COMPLEX 30 MIN: CPT

## 2024-01-07 PROCEDURE — 6360000004 HC RX CONTRAST MEDICATION: Performed by: INTERNAL MEDICINE

## 2024-01-07 PROCEDURE — 82378 CARCINOEMBRYONIC ANTIGEN: CPT

## 2024-01-07 PROCEDURE — 85025 COMPLETE CBC W/AUTO DIFF WBC: CPT

## 2024-01-07 PROCEDURE — 80053 COMPREHEN METABOLIC PANEL: CPT

## 2024-01-07 PROCEDURE — 84153 ASSAY OF PSA TOTAL: CPT

## 2024-01-07 PROCEDURE — 94761 N-INVAS EAR/PLS OXIMETRY MLT: CPT

## 2024-01-07 PROCEDURE — 86301 IMMUNOASSAY TUMOR CA 19-9: CPT

## 2024-01-07 PROCEDURE — 6370000000 HC RX 637 (ALT 250 FOR IP): Performed by: PHYSICIAN ASSISTANT

## 2024-01-07 PROCEDURE — 6360000002 HC RX W HCPCS: Performed by: STUDENT IN AN ORGANIZED HEALTH CARE EDUCATION/TRAINING PROGRAM

## 2024-01-07 RX ORDER — TAMSULOSIN HYDROCHLORIDE 0.4 MG/1
0.4 CAPSULE ORAL DAILY
Status: DISCONTINUED | OUTPATIENT
Start: 2024-01-07 | End: 2024-01-19 | Stop reason: HOSPADM

## 2024-01-07 RX ORDER — OXYCODONE HYDROCHLORIDE AND ACETAMINOPHEN 5; 325 MG/1; MG/1
1 TABLET ORAL EVERY 4 HOURS PRN
Status: DISCONTINUED | OUTPATIENT
Start: 2024-01-07 | End: 2024-01-08 | Stop reason: SDUPTHER

## 2024-01-07 RX ADMIN — IOPAMIDOL 75 ML: 755 INJECTION, SOLUTION INTRAVENOUS at 10:24

## 2024-01-07 RX ADMIN — HYDROCODONE BITARTRATE AND ACETAMINOPHEN 1 TABLET: 5; 325 TABLET ORAL at 07:40

## 2024-01-07 RX ADMIN — OXYCODONE AND ACETAMINOPHEN 1 TABLET: 5; 325 TABLET ORAL at 21:43

## 2024-01-07 RX ADMIN — OXYCODONE AND ACETAMINOPHEN 1 TABLET: 5; 325 TABLET ORAL at 13:40

## 2024-01-07 RX ADMIN — ONDANSETRON 4 MG: 4 TABLET, ORALLY DISINTEGRATING ORAL at 01:02

## 2024-01-07 RX ADMIN — ENOXAPARIN SODIUM 40 MG: 100 INJECTION SUBCUTANEOUS at 17:38

## 2024-01-07 RX ADMIN — CEFTRIAXONE 1000 MG: 1 INJECTION, POWDER, FOR SOLUTION INTRAMUSCULAR; INTRAVENOUS at 11:29

## 2024-01-07 RX ADMIN — OXYCODONE AND ACETAMINOPHEN 1 TABLET: 5; 325 TABLET ORAL at 17:38

## 2024-01-07 RX ADMIN — HYDROCODONE BITARTRATE AND ACETAMINOPHEN 1 TABLET: 5; 325 TABLET ORAL at 01:03

## 2024-01-07 RX ADMIN — TAMSULOSIN HYDROCHLORIDE 0.4 MG: 0.4 CAPSULE ORAL at 11:26

## 2024-01-07 RX ADMIN — SODIUM CHLORIDE, PRESERVATIVE FREE 10 ML: 5 INJECTION INTRAVENOUS at 20:34

## 2024-01-07 ASSESSMENT — PAIN DESCRIPTION - PAIN TYPE: TYPE: CHRONIC PAIN;ACUTE PAIN

## 2024-01-07 ASSESSMENT — PAIN DESCRIPTION - ORIENTATION
ORIENTATION: LEFT
ORIENTATION: LEFT
ORIENTATION: LEFT;RIGHT
ORIENTATION: LEFT

## 2024-01-07 ASSESSMENT — PAIN DESCRIPTION - DESCRIPTORS
DESCRIPTORS: ACHING;THROBBING
DESCRIPTORS: ACHING;THROBBING
DESCRIPTORS: ACHING;DISCOMFORT
DESCRIPTORS: ACHING

## 2024-01-07 ASSESSMENT — PAIN SCALES - GENERAL
PAINLEVEL_OUTOF10: 4
PAINLEVEL_OUTOF10: 6
PAINLEVEL_OUTOF10: 9
PAINLEVEL_OUTOF10: 10
PAINLEVEL_OUTOF10: 5
PAINLEVEL_OUTOF10: 1

## 2024-01-07 ASSESSMENT — PAIN DESCRIPTION - LOCATION
LOCATION: SHOULDER
LOCATION: LEG;SHOULDER
LOCATION: LEG
LOCATION: SHOULDER

## 2024-01-07 ASSESSMENT — PAIN DESCRIPTION - ONSET: ONSET: ON-GOING

## 2024-01-07 ASSESSMENT — PAIN - FUNCTIONAL ASSESSMENT
PAIN_FUNCTIONAL_ASSESSMENT: PREVENTS OR INTERFERES SOME ACTIVE ACTIVITIES AND ADLS
PAIN_FUNCTIONAL_ASSESSMENT: PREVENTS OR INTERFERES SOME ACTIVE ACTIVITIES AND ADLS

## 2024-01-07 ASSESSMENT — PAIN DESCRIPTION - FREQUENCY: FREQUENCY: CONTINUOUS

## 2024-01-07 NOTE — PROGRESS NOTES
The charge and this nurse saw patient call light on, patient complaint of pain and we notice that garcia catheter bloody but clear. Paged hospitalist for pain medicine. She ordered norco po every 6 hours prn. Also ordered to flush catheter with 10 cc ns.

## 2024-01-07 NOTE — CONSULTS
[facility] ACUTE CARE PHYSICAL THERAPY EVALUATION  Sebastian Perkins, 1960, 4119/4119-A, 1/7/2024    Discharge Recommendation: SNF    Equipment: defer    History  [place]:  The primary encounter diagnosis was Septicemia (HCC). Diagnoses of Acute cystitis with hematuria, Metastatic malignant neoplasm, unspecified site (HCC), and Generalized weakness were also pertinent to this visit.    Subjective:  Patient states:  says right leg feels very weak, concerned bc this is source of all his power for transfers and ambulation.    Pain:  back pain in L periscapular region worse with activity  Communication with other providers: OT  Restrictions: fall risk    Home Setup/Prior level of function  Social/Functional History  Type of Home: Facility  Home Equipment: Walker, rolling, Wheelchair-manual  Has the patient had two or more falls in the past year or any fall with injury in the past year?: No  ADL Assistance: Needs assistance  Homemaking Responsibilities: No  Ambulation Assistance: Needs assistance (uses w/c for most mobility at mod I, reports able to walk short distances w/ assistance)  Transfer Assistance: Independent (mod I SPT)    Examination of body systems (includes body structures/functions, activity/participation limitations):  Observation:  Supine in bed upon arrival, left hemiparesis UE/LE from old CVA  Vision:  WFL  Hearing:  WFL  Cardiopulmonary: VSS    Cognition: WFL, see OT/SLP note for further evaluation.    Body Structures/Function  ROM R/L:  decreased AROM of bilat LE's and L UE  Strength R/L:  difficult to assess 2/2 pain but minimal motor ability in bilat LE's, 0/5 LUE, severe weakness observed in function.    Neuro:  in addition to above, absent sensation in the R LE distal to L1      Mobility:  Rolling L/R:  max  Supine to sit:  max-dependent, cues for sequencing, assist for all steps, uses R UE for pushoff but otherwise requiring total assist to complete, c/o back pain, mod-max A for

## 2024-01-07 NOTE — CONSULTS
Full consult to follow.    CT chest/abd/pelvis reviewed.  Concern for metastasis.  Unknown primary.  T2 and T7 vertebral compression fractures seen, likely pathologic.    Recommend MRI thoracic spine with and without contrast.  No need for external brace at this time.  Further recommendations to follow.

## 2024-01-07 NOTE — PROGRESS NOTES
Occupational Therapy  [facility] ACUTE CARE OCCUPATIONAL THERAPY EVALUATION    Sebastian Perkins, 1960, 4119/4119-A, 1/7/2024    Discharge Recommendation: Skilled Nursing Facility       History:  [place]:  The primary encounter diagnosis was Septicemia (HCC). Diagnoses of Acute cystitis with hematuria, Metastatic malignant neoplasm, unspecified site (HCC), and Generalized weakness were also pertinent to this visit.  Past Medical History:   Diagnosis Date    Cerebral artery occlusion with cerebral infarction (HCC)          Subjective:  Patient states: \"I can't feel my R leg\"  Pain:  reports significant back pain near L scapula, did not rate but limited in activity tolerance d/t pain  Communication with other providers: co-eval w/ PT, handoff to RN  Restrictions: General Precautions, Fall Risk    Home Setup/Prior level of function:  Social/Functional History  Type of Home: Facility  Home Equipment: Walker, rolling, Wheelchair-manual  Has the patient had two or more falls in the past year or any fall with injury in the past year?: No  ADL Assistance: Needs assistance  Homemaking Responsibilities: No  Ambulation Assistance: Needs assistance (uses w/c for most mobility at mod I, reports able to walk short distances w/ assistance)  Transfer Assistance: Independent (mod I SPT)     Examination:  Observation: Supine in bed upon arrival, agreeable to therapy eval.  Vision: WFL  Hearing: WFL  Vitals: Stable vitals throughout session on room air      Body Systems and functions:  ROM: R UE WFL, L UE no AROM d/t old CVA, PROM ~50% of sh normal range and distally WFL   Strength: R UE grossly 4-/5 across all major joints, L UE 0/5   Sensation: WFL  Tone: R UE Normal, L UE flaccid  Coordination: R UE WFL  Perception: WNL      Cognitive and Psychosocial Functioning:  Overall cognitive status: alert and oriented, appears WFL  Affect: Normal       Functional Mobility:  Bed mobility:  Max A rolling R/L while supine, supine  to/from sitting EOB w/ max A, Vcs for sequencing and initiation  Sitting balance:  mod-max A to maintain static EOB sitting ~5 min. Unable to tolerate further activity d/t severe back pain, requested to return to supine and end session   Transfers: NT d/t safety/assist levels  Standing balance:  NT  Functional Mobility: NT  Toilet/Shower Transfers: NT        Activities of Daily Living (ADLs):  Feeding: set up A  Grooming: SBA while long sitting in bed  UB bathing: mod A  LB bathing: max A  UB dressing: mod A  LB dressing: max a  Toileting: max A    *ADL determined per observation of functional mobility, balance, activity tolerance, cognition, or actual ADL performance.     AM-PAC 6 click short form for inpatient daily activity:   How much help from another person does the patient currently need... Unable  Dep A Lot  Max A A Lot   Mod A A Little  Min A A Little   CGA  SBA None   Mod I  Indep  Sup   1.  Putting on and taking off regular lower body clothing? [] 1    [x] 2   [] 2   [] 3   [] 3   [] 4      2. Bathing (including washing, rinsing, drying)? [] 1   [x] 2   [] 2 [] 3 [] 3 [] 4   3. Toileting, which includes using toilet, bedpan, or urinal? [] 1    [x] 2   [] 2   [] 3   [] 3   [] 4     4. Putting on and taking off regular upper body clothing? [] 1   [] 2   [x] 2   [] 3   [] 3    [] 4      5. Taking care of personal grooming such as brushing teeth? [] 1   [] 2    [] 2 [] 3    [x] 3   [] 4      6. Eating meals?   [] 1   [] 2   [] 2   [] 3   [] 3   [x] 4        Raw Score:  15     [24=0% impaired(CH), 23=1-19%(CI), 20-22=20-39%(CJ), 15-19=40-59%(CK), 10-14=60-79%(CL), 7-9=80-99%(CM), 6=100%(CN)]     Treatment:    Therapeutic Activity Training:   Therapeutic activity training was instructed today.  Cues were given for safety, sequence, UE/LE placement, awareness, and balance.    Activities performed today included bed mobility training, sup-sit.      Educated pt on role of OT, therapy POC and functional goals,

## 2024-01-07 NOTE — CONSULTS
EOSABS 0.0 01/06/2024    BASOSABS 0.0 01/06/2024    LYMPHSABS 1.7 01/06/2024    MONOSABS 1.0 01/06/2024    DIFFTYPE AUTOMATED DIFFERENTIAL 01/06/2024     No results found for: \"ESR\"  Chemistry:  Lab Results   Component Value Date     01/06/2024    K 3.8 01/06/2024     01/06/2024    CO2 26 01/06/2024    BUN 15 01/06/2024    CREATININE 0.5 (L) 01/06/2024    GLUCOSE 113 (H) 01/06/2024    CALCIUM 9.1 01/06/2024    PROT 7.3 01/06/2024    LABALBU 3.0 (L) 01/06/2024    BILITOT 0.4 01/06/2024    ALKPHOS 110 01/06/2024    AST 21 01/06/2024    ALT 12 01/06/2024    LABGLOM >60 01/06/2024    GFRAA >60 07/24/2019    MG 2.1 01/06/2024     No results found for: \"MMA\", \"LDH\", \"HOMOCYSTEINE\"  No results found for: \"LD\"  No results found for: \"TSHHS\", \"T4FREE\", \"FT3\"  Immunology:  Lab Results   Component Value Date    PROT 7.3 01/06/2024     No results found for: \"KAPPAUVOL\", \"LAMBDAUVOL\", \"KLFLCR\"  No results found for: \"B2M\"  Coagulation Panel:  No results found for: \"PROTIME\", \"INR\", \"APTT\", \"DDIMER\"  Anemia Panel:  No results found for: \"OPNLTTIO85\", \"FOLATE\"  Tumor Markers:  No results found for: \"\", \"CEA\", \"\", \"LABCA2\", \"PSA\"     Observations:  No data recorded     Assessment:  Several suspicious liver lesions  Right adrenal nodule  Right renal cystic lesion  Right pubic bone and left iliac wing lytic lesions  Right hilar lesions/lymphadenopathy  Prostatomegaly    Plan:  I reviewed his CT images today. CT chest official report is still pending. CT findings are concerning for metastatic disease.     PSA level is pending. Will check CEA, CA 19-9 and AFP. Reviewed with him about having CT guided biopsy and he is agreeable. Will request CT guided biopsy to confirm the diagnosis.     He will need non emergent MRI abdomen to further evaluation his right kidney lesion and adrenal nodule.     I answered all his questions and concerns for today. Thank you for allowing me to participate in the care of this very  pleasant patient.    Recent imaging and labs were reviewed and discussed with the patient.

## 2024-01-07 NOTE — PROGRESS NOTES
V2.0  [facility] Hospitalist Progress Note      Name:  Sebastian Perkins /Age/Sex: 1960  (63 y.o. male)   MRN & CSN:  [id number] & [id number] Encounter Date/Time: 2024 12:33 PM EST    [facility] PCP: No primary care provider on file.       Hospital Day: 2      Subjective:     Chief Complaint:  Back Pain (Low back pain from nursing home. Reports chronic pain but worse today. Reports a hard time stand d/t pain ) and Shoulder Pain (Left shoulder, chronic pain but worse today. NKI)     Pt still complains of back pain and right sided weakness. Not improved with Norco.  Has SOB, cough. Nausea, and vomiting yesterday as well.  Still has bloody urine.       Assessment and Plan:   Metastatic disease of unknown origin  Worsening back and shoulder pain.  CT abdomen/pelvis on admission with lytic lesions in right pubic bone and left iliac wing, several suspicious hepatic lesions and right hilar lymphadenopathy versus perihilar pulmonary nodules.  Also an indeterminate 2.2 cm right adrenal nodule.  There is also a 1.5 cm indeterminate right renal cystic lesion.  CT chest showed lung mass, scapula mass and thoracic fractures  -Oncology consulted  - plan for biopsy    Back pain likely 2/2 thoracic fractures. CT showed compression injuries at T2 and T7  - consult neurosurgery  - change pain regimen to Percocet q4h     UTI  Generalized weakness. Denies urinary symptoms has hematuria trauamatic from garcia placement  -Continue Rocephin, follow urine cultures    Urinary retention. Has garcia placed.   Urology consulted.       Hx of CVA -residual left sided weakness      Personally reviewed Lab Studies and Imaging       Imaging that was interpreted personally includes CT chest and results as above    Drugs that require monitoring for toxicity include lovenox and the method of monitoring was CBC      Diet ADULT DIET; Regular   DVT Prophylaxis [x] Lovenox, []  Heparin, [] SCDs, [] Ambulation,  [] Eliquis, [] Xarelto  []  30.  Questionable ill-defined low-density lesion in the left hepatic lobe measuring 3.3 cm on axial image 44.  The gallbladder is normal in appearance.  No biliary ductal dilatation. The pancreas, spleen, and left adrenal gland are unremarkable.  2.2 cm indeterminate right adrenal nodule with average Hounsfield units of approximately 85.  1.5 cm indeterminate right renal cystic lesion with average Hounsfield units of approximately 30 on axial image 73.  Several subcentimeter cystic lesions in the bilateral kidneys too small to definitively characterize but likely to represent benign cysts.  Suspected post treatment changes in the superior pole of the left kidney.  No hydroureteronephrosis.  Nonobstructing 5 mm calculus in the lower pole of the left kidney. GI/Bowel: Colonic diverticulosis without evidence of acute diverticulitis. Normal appendix.  The stomach and small bowel are normal in appearance.  No obstruction or wall thickening identified. Pelvis: The urinary bladder is moderately distended with multiple diverticula.  No abnormal wall thickening identified.  Layering calculi within the urinary bladder lumen are seen.  Moderate prostatomegaly.  No free fluid in the pelvis.  No pelvic or inguinal lymphadenopathy. Peritoneum/Retroperitoneum: The abdominal aorta is normal in caliber with moderate atherosclerosis.  No acute vascular abnormality identified.  No retroperitoneal or mesenteric lymphadenopathy is identified.  No free air or fluid is seen in the abdomen. Bones/Soft Tissues: Lytic destructive lesion within the right pubic bone with soft tissue extension into the adjacent musculature.  This measures approximately 2.7 cm on axial image 179.  1.1 cm lytic lesion in the left iliac wing on axial image 132.  No acute osseous or soft tissue abnormality.     1. Metastatic disease of uncertain origin with lytic lesions in the right pubic bone and left iliac wing, several suspicious hepatic lesions, and right

## 2024-01-07 NOTE — CONSULTS
[address] [address]   Consult Note  [facility] 1 2 3 4 5    Date: 2024   Patient: Sebastian Perkins   : 1960   DOA: 2024   MRN: [id number]   ROOM#: [id number]     Reason for Consult: Blood clots in garcia unable to keep irrigated.   Requesting Physician: Kristina Deras CNP  Collaborating Urologist on Call at time of admission: Dr. Borden  CHIEF COMPLAINT: Back and shoulder pain    History Obtained From: patient, electronic medical record    HISTORY OF PRESENT ILLNESS:                The patient is a 63 y.o. male with significant past medical history of CVA who presented from [facility] 24 with back/shoulder pain and generalized weakness. Work-up in the ED revealed concern for sepsis, UTI, and metastatic disease of unknown source on CT. He had a catheter placed in the ED d/t bladder distention on CT with clear yellow urine return. Overnight, his urine became bloody and staff had difficulty irrigation the catheter. This morning, pt reports feeling well with no flank/abd pain or discomfort with his catheter. He is unable to provide a thorough history. I manually irrigated his bladder with 500cc sterile water and had several small clots returned. I did have some difficulty pulling fluid back from syringe, likely secondary to bladder spasms. The catheter balloon was able to be deflated and advanced fully in the bladder with no issues. Urine pink-tinged by end of irrigation. Pt tolerated well with no complications.    ED Provider's HPI 24: Sebastian Perkins is a 63 y.o. male who presents for evaluation from [facility] by EMS for evaluation of reported back and shoulder pain.  EMS reported they were told by nursing staff that the patient was complaining of worsening back and shoulder pain and was having a hard time standing due to pain this morning.  Patient states in his left hip which is worse than usual as well as his left shoulder which is the same as usual.  Patient has history

## 2024-01-07 NOTE — FLOWSHEET NOTE
Consult to Dr. Ragland -    vicky w back pain. found to have metastatic disease. CT also showed compression injuries at T2 and T7

## 2024-01-07 NOTE — PLAN OF CARE
Problem: Discharge Planning  Goal: Discharge to home or other facility with appropriate resources  1/7/2024 0140 by Natali Craven LPN  Outcome: Progressing  1/6/2024 1759 by Esther Morin RN  Outcome: Progressing     Problem: Pain  Goal: Verbalizes/displays adequate comfort level or baseline comfort level  1/7/2024 0140 by Natali Craven LPN  Outcome: Progressing  1/6/2024 1759 by Esther Morin RN  Outcome: Progressing     Problem: Safety - Adult  Goal: Free from fall injury  1/7/2024 0140 by Natali Craven LPN  Outcome: Progressing  1/6/2024 1759 by Esther Morin RN  Outcome: Progressing     Problem: Skin/Tissue Integrity  Goal: Absence of new skin breakdown  Description: 1.  Monitor for areas of redness and/or skin breakdown  2.  Assess vascular access sites hourly  3.  Every 4-6 hours minimum:  Change oxygen saturation probe site  4.  Every 4-6 hours:  If on nasal continuous positive airway pressure, respiratory therapy assess nares and determine need for appliance change or resting period.  Outcome: Progressing     Problem: ABCDS Injury Assessment  Goal: Absence of physical injury  Outcome: Progressing

## 2024-01-08 ENCOUNTER — APPOINTMENT (OUTPATIENT)
Dept: MRI IMAGING | Age: 64
End: 2024-01-08
Payer: COMMERCIAL

## 2024-01-08 ENCOUNTER — APPOINTMENT (OUTPATIENT)
Dept: GENERAL RADIOLOGY | Age: 64
End: 2024-01-08
Payer: COMMERCIAL

## 2024-01-08 ENCOUNTER — ANESTHESIA EVENT (OUTPATIENT)
Dept: OPERATING ROOM | Age: 64
End: 2024-01-08
Payer: COMMERCIAL

## 2024-01-08 ENCOUNTER — APPOINTMENT (OUTPATIENT)
Dept: CT IMAGING | Age: 64
End: 2024-01-08
Payer: COMMERCIAL

## 2024-01-08 ENCOUNTER — ANESTHESIA (OUTPATIENT)
Dept: OPERATING ROOM | Age: 64
End: 2024-01-08
Payer: COMMERCIAL

## 2024-01-08 PROBLEM — C79.9 METASTATIC MALIGNANT NEOPLASM (HCC): Status: ACTIVE | Noted: 2024-01-08

## 2024-01-08 LAB
ABO/RH: NORMAL
ALBUMIN SERPL-MCNC: 2.6 GM/DL (ref 3.4–5)
ALP BLD-CCNC: 101 IU/L (ref 40–128)
ALT SERPL-CCNC: 10 U/L (ref 10–40)
ANION GAP SERPL CALCULATED.3IONS-SCNC: 9 MMOL/L (ref 7–16)
ANTIBODY SCREEN: NEGATIVE
APTT: 29.7 SECONDS (ref 25.1–37.1)
AST SERPL-CCNC: 13 IU/L (ref 15–37)
BACTERIA: NEGATIVE /HPF
BASOPHILS ABSOLUTE: 0.1 K/CU MM
BASOPHILS RELATIVE PERCENT: 0.3 % (ref 0–1)
BILIRUB SERPL-MCNC: 0.2 MG/DL (ref 0–1)
BILIRUBIN URINE: ABNORMAL MG/DL
BLOOD, URINE: ABNORMAL
BUN SERPL-MCNC: 14 MG/DL (ref 6–23)
CALCIUM SERPL-MCNC: 8.1 MG/DL (ref 8.3–10.6)
CHLORIDE BLD-SCNC: 106 MMOL/L (ref 99–110)
CLARITY: ABNORMAL
CO2: 27 MMOL/L (ref 21–32)
COLOR: ABNORMAL
CREAT SERPL-MCNC: 0.5 MG/DL (ref 0.9–1.3)
CRP SERPL HS-MCNC: 79.4 MG/L
CULTURE: NORMAL
DIFFERENTIAL TYPE: ABNORMAL
EOSINOPHILS ABSOLUTE: 0.1 K/CU MM
EOSINOPHILS RELATIVE PERCENT: 0.4 % (ref 0–3)
ERYTHROCYTE SEDIMENTATION RATE: 82 MM/HR (ref 0–20)
ESTIMATED AVERAGE GLUCOSE: 100 MG/DL
GFR SERPL CREATININE-BSD FRML MDRD: >60 ML/MIN/1.73M2
GLUCOSE SERPL-MCNC: 124 MG/DL (ref 70–99)
GLUCOSE, URINE: NEGATIVE MG/DL
HBA1C MFR BLD: 5.1 % (ref 4.2–6.3)
HCT VFR BLD CALC: 31.6 % (ref 42–52)
HEMOGLOBIN: 10.1 GM/DL (ref 13.5–18)
IMMATURE NEUTROPHIL %: 0.4 % (ref 0–0.43)
INR BLD: 1.2 INDEX
KETONES, URINE: NEGATIVE MG/DL
LEUKOCYTE ESTERASE, URINE: ABNORMAL
LYMPHOCYTES ABSOLUTE: 1.9 K/CU MM
LYMPHOCYTES RELATIVE PERCENT: 12.5 % (ref 24–44)
Lab: NORMAL
MCH RBC QN AUTO: 27.8 PG (ref 27–31)
MCHC RBC AUTO-ENTMCNC: 32 % (ref 32–36)
MCV RBC AUTO: 87.1 FL (ref 78–100)
MONOCYTES ABSOLUTE: 1.2 K/CU MM
MONOCYTES RELATIVE PERCENT: 7.9 % (ref 0–4)
MUCUS: ABNORMAL HPF
NITRITE URINE, QUANTITATIVE: NEGATIVE
NUCLEATED RBC %: 0 %
PDW BLD-RTO: 15.5 % (ref 11.7–14.9)
PH, URINE: 7 (ref 5–8)
PLATELET # BLD: 448 K/CU MM (ref 140–440)
PMV BLD AUTO: 10.2 FL (ref 7.5–11.1)
POTASSIUM SERPL-SCNC: 3.7 MMOL/L (ref 3.5–5.1)
PROCALCITONIN SERPL-MCNC: 0.45 NG/ML
PROTEIN UA: 100 MG/DL
PROTHROMBIN TIME: 15.3 SECONDS (ref 11.7–14.5)
RBC # BLD: 3.63 M/CU MM (ref 4.6–6.2)
RBC URINE: 3743 /HPF (ref 0–3)
SEGMENTED NEUTROPHILS ABSOLUTE COUNT: 11.7 K/CU MM
SEGMENTED NEUTROPHILS RELATIVE PERCENT: 78.5 % (ref 36–66)
SODIUM BLD-SCNC: 142 MMOL/L (ref 135–145)
SPECIFIC GRAVITY UA: 1.02 (ref 1–1.03)
SPECIMEN: NORMAL
TOTAL IMMATURE NEUTOROPHIL: 0.06 K/CU MM
TOTAL NUCLEATED RBC: 0 K/CU MM
TOTAL PROTEIN: 5.2 GM/DL (ref 6.4–8.2)
TRICHOMONAS: ABNORMAL /HPF
UROBILINOGEN, URINE: 4 MG/DL (ref 0.2–1)
WBC # BLD: 15 K/CU MM (ref 4–10.5)
WBC UA: 8 /HPF (ref 0–2)

## 2024-01-08 PROCEDURE — 85652 RBC SED RATE AUTOMATED: CPT

## 2024-01-08 PROCEDURE — C1769 GUIDE WIRE: HCPCS | Performed by: NEUROLOGICAL SURGERY

## 2024-01-08 PROCEDURE — 6370000000 HC RX 637 (ALT 250 FOR IP): Performed by: STUDENT IN AN ORGANIZED HEALTH CARE EDUCATION/TRAINING PROGRAM

## 2024-01-08 PROCEDURE — 88342 IMHCHEM/IMCYTCHM 1ST ANTB: CPT | Performed by: PATHOLOGY

## 2024-01-08 PROCEDURE — 86900 BLOOD TYPING SEROLOGIC ABO: CPT

## 2024-01-08 PROCEDURE — A9579 GAD-BASE MR CONTRAST NOS,1ML: HCPCS | Performed by: PHYSICIAN ASSISTANT

## 2024-01-08 PROCEDURE — C1713 ANCHOR/SCREW BN/BN,TIS/BN: HCPCS | Performed by: NEUROLOGICAL SURGERY

## 2024-01-08 PROCEDURE — 88331 PATH CONSLTJ SURG 1 BLK 1SPC: CPT | Performed by: PATHOLOGY

## 2024-01-08 PROCEDURE — 6370000000 HC RX 637 (ALT 250 FOR IP)

## 2024-01-08 PROCEDURE — 0RG70K1 FUSION OF 2 TO 7 THORACIC VERTEBRAL JOINTS WITH NONAUTOLOGOUS TISSUE SUBSTITUTE, POSTERIOR APPROACH, POSTERIOR COLUMN, OPEN APPROACH: ICD-10-PCS | Performed by: NEUROLOGICAL SURGERY

## 2024-01-08 PROCEDURE — 2000000000 HC ICU R&B

## 2024-01-08 PROCEDURE — 6360000002 HC RX W HCPCS: Performed by: STUDENT IN AN ORGANIZED HEALTH CARE EDUCATION/TRAINING PROGRAM

## 2024-01-08 PROCEDURE — 85025 COMPLETE CBC W/AUTO DIFF WBC: CPT

## 2024-01-08 PROCEDURE — 85730 THROMBOPLASTIN TIME PARTIAL: CPT

## 2024-01-08 PROCEDURE — 3700000000 HC ANESTHESIA ATTENDED CARE: Performed by: NEUROLOGICAL SURGERY

## 2024-01-08 PROCEDURE — 2500000003 HC RX 250 WO HCPCS: Performed by: NEUROLOGICAL SURGERY

## 2024-01-08 PROCEDURE — 72157 MRI CHEST SPINE W/O & W/DYE: CPT

## 2024-01-08 PROCEDURE — 83036 HEMOGLOBIN GLYCOSYLATED A1C: CPT

## 2024-01-08 PROCEDURE — 6370000000 HC RX 637 (ALT 250 FOR IP): Performed by: PHYSICIAN ASSISTANT

## 2024-01-08 PROCEDURE — 6360000004 HC RX CONTRAST MEDICATION: Performed by: PHYSICIAN ASSISTANT

## 2024-01-08 PROCEDURE — 3600000016 HC SURGERY LEVEL 6 ADDTL 15MIN: Performed by: NEUROLOGICAL SURGERY

## 2024-01-08 PROCEDURE — 2580000003 HC RX 258

## 2024-01-08 PROCEDURE — 84145 PROCALCITONIN (PCT): CPT

## 2024-01-08 PROCEDURE — 86850 RBC ANTIBODY SCREEN: CPT

## 2024-01-08 PROCEDURE — 3700000001 HC ADD 15 MINUTES (ANESTHESIA): Performed by: NEUROLOGICAL SURGERY

## 2024-01-08 PROCEDURE — 94761 N-INVAS EAR/PLS OXIMETRY MLT: CPT

## 2024-01-08 PROCEDURE — 88307 TISSUE EXAM BY PATHOLOGIST: CPT | Performed by: PATHOLOGY

## 2024-01-08 PROCEDURE — 3600000006 HC SURGERY LEVEL 6 BASE: Performed by: NEUROLOGICAL SURGERY

## 2024-01-08 PROCEDURE — 80053 COMPREHEN METABOLIC PANEL: CPT

## 2024-01-08 PROCEDURE — 81001 URINALYSIS AUTO W/SCOPE: CPT

## 2024-01-08 PROCEDURE — 2580000003 HC RX 258: Performed by: NEUROLOGICAL SURGERY

## 2024-01-08 PROCEDURE — 86901 BLOOD TYPING SEROLOGIC RH(D): CPT

## 2024-01-08 PROCEDURE — 00NX0ZZ RELEASE THORACIC SPINAL CORD, OPEN APPROACH: ICD-10-PCS | Performed by: NEUROLOGICAL SURGERY

## 2024-01-08 PROCEDURE — 99232 SBSQ HOSP IP/OBS MODERATE 35: CPT | Performed by: PHYSICIAN ASSISTANT

## 2024-01-08 PROCEDURE — 86140 C-REACTIVE PROTEIN: CPT

## 2024-01-08 PROCEDURE — 2500000003 HC RX 250 WO HCPCS

## 2024-01-08 PROCEDURE — 72128 CT CHEST SPINE W/O DYE: CPT

## 2024-01-08 PROCEDURE — 36415 COLL VENOUS BLD VENIPUNCTURE: CPT

## 2024-01-08 PROCEDURE — 76000 FLUOROSCOPY <1 HR PHYS/QHP: CPT

## 2024-01-08 PROCEDURE — 01N80ZZ RELEASE THORACIC NERVE, OPEN APPROACH: ICD-10-PCS | Performed by: NEUROLOGICAL SURGERY

## 2024-01-08 PROCEDURE — 88341 IMHCHEM/IMCYTCHM EA ADD ANTB: CPT | Performed by: PATHOLOGY

## 2024-01-08 PROCEDURE — 6360000002 HC RX W HCPCS

## 2024-01-08 PROCEDURE — 99024 POSTOP FOLLOW-UP VISIT: CPT | Performed by: PHYSICIAN ASSISTANT

## 2024-01-08 PROCEDURE — 0PB40ZZ EXCISION OF THORACIC VERTEBRA, OPEN APPROACH: ICD-10-PCS | Performed by: NEUROLOGICAL SURGERY

## 2024-01-08 PROCEDURE — 2709999900 HC NON-CHARGEABLE SUPPLY: Performed by: NEUROLOGICAL SURGERY

## 2024-01-08 PROCEDURE — 6360000002 HC RX W HCPCS: Performed by: NEUROLOGICAL SURGERY

## 2024-01-08 PROCEDURE — 2580000003 HC RX 258: Performed by: STUDENT IN AN ORGANIZED HEALTH CARE EDUCATION/TRAINING PROGRAM

## 2024-01-08 PROCEDURE — 85610 PROTHROMBIN TIME: CPT

## 2024-01-08 PROCEDURE — P9045 ALBUMIN (HUMAN), 5%, 250 ML: HCPCS

## 2024-01-08 PROCEDURE — 88360 TUMOR IMMUNOHISTOCHEM/MANUAL: CPT | Performed by: PATHOLOGY

## 2024-01-08 PROCEDURE — 2720000010 HC SURG SUPPLY STERILE: Performed by: NEUROLOGICAL SURGERY

## 2024-01-08 DEVICE — SCREW 55850014545 5.5 VOYAGER MAS 4.5X45
Type: IMPLANTABLE DEVICE | Site: SPINE THORACIC | Status: FUNCTIONAL
Brand: CD HORIZON® SOLERA® VOYAGER™ SPINAL SYSTEM

## 2024-01-08 DEVICE — ROD 1555006140 5.5 CCM STRT PERC 140MM
Type: IMPLANTABLE DEVICE | Site: SPINE THORACIC | Status: FUNCTIONAL
Brand: CD HORIZON® SPINAL SYSTEM

## 2024-01-08 DEVICE — SET SCREW 6540530 5.5/6.0 SOLERA VOYAGER
Type: IMPLANTABLE DEVICE | Site: SPINE THORACIC | Status: FUNCTIONAL
Brand: CD HORIZON® SOLERA® SPINAL SYSTEM

## 2024-01-08 RX ORDER — FENTANYL CITRATE 50 UG/ML
50 INJECTION, SOLUTION INTRAMUSCULAR; INTRAVENOUS EVERY 5 MIN PRN
Status: DISCONTINUED | OUTPATIENT
Start: 2024-01-08 | End: 2024-01-15

## 2024-01-08 RX ORDER — OXYCODONE HYDROCHLORIDE 5 MG/1
5 TABLET ORAL
Status: DISCONTINUED | OUTPATIENT
Start: 2024-01-08 | End: 2024-01-09

## 2024-01-08 RX ORDER — LIDOCAINE HYDROCHLORIDE AND EPINEPHRINE 10; 10 MG/ML; UG/ML
INJECTION, SOLUTION INFILTRATION; PERINEURAL
Status: COMPLETED | OUTPATIENT
Start: 2024-01-08 | End: 2024-01-08

## 2024-01-08 RX ORDER — SODIUM CHLORIDE, SODIUM LACTATE, POTASSIUM CHLORIDE, CALCIUM CHLORIDE 600; 310; 30; 20 MG/100ML; MG/100ML; MG/100ML; MG/100ML
INJECTION, SOLUTION INTRAVENOUS CONTINUOUS
Status: DISCONTINUED | OUTPATIENT
Start: 2024-01-08 | End: 2024-01-08

## 2024-01-08 RX ORDER — SODIUM CHLORIDE 9 MG/ML
INJECTION, SOLUTION INTRAVENOUS PRN
Status: DISCONTINUED | OUTPATIENT
Start: 2024-01-08 | End: 2024-01-15

## 2024-01-08 RX ORDER — ONDANSETRON 2 MG/ML
4 INJECTION INTRAMUSCULAR; INTRAVENOUS
Status: ACTIVE | OUTPATIENT
Start: 2024-01-08 | End: 2024-01-09

## 2024-01-08 RX ORDER — KETAMINE HCL 50MG/ML(1)
SYRINGE (ML) INTRAVENOUS PRN
Status: DISCONTINUED | OUTPATIENT
Start: 2024-01-08 | End: 2024-01-08 | Stop reason: SDUPTHER

## 2024-01-08 RX ORDER — SODIUM CHLORIDE 0.9 % (FLUSH) 0.9 %
5-40 SYRINGE (ML) INJECTION PRN
Status: DISCONTINUED | OUTPATIENT
Start: 2024-01-08 | End: 2024-01-15

## 2024-01-08 RX ORDER — ONDANSETRON 2 MG/ML
INJECTION INTRAMUSCULAR; INTRAVENOUS PRN
Status: DISCONTINUED | OUTPATIENT
Start: 2024-01-08 | End: 2024-01-08 | Stop reason: SDUPTHER

## 2024-01-08 RX ORDER — DIAZEPAM 5 MG/1
5 TABLET ORAL EVERY 8 HOURS PRN
Status: DISCONTINUED | OUTPATIENT
Start: 2024-01-08 | End: 2024-01-19 | Stop reason: HOSPADM

## 2024-01-08 RX ORDER — LABETALOL HYDROCHLORIDE 5 MG/ML
10 INJECTION, SOLUTION INTRAVENOUS
Status: DISCONTINUED | OUTPATIENT
Start: 2024-01-08 | End: 2024-01-15

## 2024-01-08 RX ORDER — SUCCINYLCHOLINE/SOD CL,ISO/PF 100 MG/5ML
SYRINGE (ML) INTRAVENOUS PRN
Status: DISCONTINUED | OUTPATIENT
Start: 2024-01-08 | End: 2024-01-08 | Stop reason: SDUPTHER

## 2024-01-08 RX ORDER — POLYETHYLENE GLYCOL 3350 17 G/17G
17 POWDER, FOR SOLUTION ORAL DAILY
Status: DISCONTINUED | OUTPATIENT
Start: 2024-01-09 | End: 2024-01-17

## 2024-01-08 RX ORDER — HYDRALAZINE HYDROCHLORIDE 20 MG/ML
10 INJECTION INTRAMUSCULAR; INTRAVENOUS
Status: DISCONTINUED | OUTPATIENT
Start: 2024-01-08 | End: 2024-01-15

## 2024-01-08 RX ORDER — DROPERIDOL 2.5 MG/ML
0.62 INJECTION, SOLUTION INTRAMUSCULAR; INTRAVENOUS
Status: ACTIVE | OUTPATIENT
Start: 2024-01-08 | End: 2024-01-09

## 2024-01-08 RX ORDER — ALBUMIN, HUMAN INJ 5% 5 %
SOLUTION INTRAVENOUS PRN
Status: DISCONTINUED | OUTPATIENT
Start: 2024-01-08 | End: 2024-01-08 | Stop reason: SDUPTHER

## 2024-01-08 RX ORDER — DEXAMETHASONE SODIUM PHOSPHATE 4 MG/ML
INJECTION, SOLUTION INTRA-ARTICULAR; INTRALESIONAL; INTRAMUSCULAR; INTRAVENOUS; SOFT TISSUE PRN
Status: DISCONTINUED | OUTPATIENT
Start: 2024-01-08 | End: 2024-01-08 | Stop reason: SDUPTHER

## 2024-01-08 RX ORDER — PROPOFOL 10 MG/ML
INJECTION, EMULSION INTRAVENOUS PRN
Status: DISCONTINUED | OUTPATIENT
Start: 2024-01-08 | End: 2024-01-08 | Stop reason: SDUPTHER

## 2024-01-08 RX ORDER — SENNA AND DOCUSATE SODIUM 50; 8.6 MG/1; MG/1
1 TABLET, FILM COATED ORAL 2 TIMES DAILY
Status: DISCONTINUED | OUTPATIENT
Start: 2024-01-08 | End: 2024-01-19 | Stop reason: HOSPADM

## 2024-01-08 RX ORDER — MEPERIDINE HYDROCHLORIDE 25 MG/ML
12.5 INJECTION INTRAMUSCULAR; INTRAVENOUS; SUBCUTANEOUS EVERY 5 MIN PRN
Status: DISCONTINUED | OUTPATIENT
Start: 2024-01-08 | End: 2024-01-15

## 2024-01-08 RX ORDER — OXYCODONE HYDROCHLORIDE AND ACETAMINOPHEN 5; 325 MG/1; MG/1
2 TABLET ORAL EVERY 4 HOURS PRN
Status: DISCONTINUED | OUTPATIENT
Start: 2024-01-08 | End: 2024-01-09

## 2024-01-08 RX ORDER — SODIUM CHLORIDE 0.9 % (FLUSH) 0.9 %
5-40 SYRINGE (ML) INJECTION EVERY 12 HOURS SCHEDULED
Status: DISCONTINUED | OUTPATIENT
Start: 2024-01-08 | End: 2024-01-15

## 2024-01-08 RX ORDER — ROCURONIUM BROMIDE 10 MG/ML
INJECTION, SOLUTION INTRAVENOUS PRN
Status: DISCONTINUED | OUTPATIENT
Start: 2024-01-08 | End: 2024-01-08 | Stop reason: SDUPTHER

## 2024-01-08 RX ORDER — SODIUM CHLORIDE 9 MG/ML
INJECTION, SOLUTION INTRAVENOUS CONTINUOUS
Status: DISCONTINUED | OUTPATIENT
Start: 2024-01-08 | End: 2024-01-09

## 2024-01-08 RX ADMIN — HYDROMORPHONE HYDROCHLORIDE 0.5 MG: 1 INJECTION, SOLUTION INTRAMUSCULAR; INTRAVENOUS; SUBCUTANEOUS at 21:47

## 2024-01-08 RX ADMIN — Medication 100 MG: at 17:56

## 2024-01-08 RX ADMIN — PHENYLEPHRINE HYDROCHLORIDE 100 MCG: 10 INJECTION INTRAVENOUS at 18:11

## 2024-01-08 RX ADMIN — HYDROMORPHONE HYDROCHLORIDE 0.5 MG: 1 INJECTION, SOLUTION INTRAMUSCULAR; INTRAVENOUS; SUBCUTANEOUS at 09:58

## 2024-01-08 RX ADMIN — PROPOFOL 100 MG: 10 INJECTION, EMULSION INTRAVENOUS at 17:56

## 2024-01-08 RX ADMIN — DEXAMETHASONE SODIUM PHOSPHATE 8 MG: 4 INJECTION, SOLUTION INTRAMUSCULAR; INTRAVENOUS at 18:29

## 2024-01-08 RX ADMIN — SODIUM CHLORIDE, PRESERVATIVE FREE 10 ML: 5 INJECTION INTRAVENOUS at 10:00

## 2024-01-08 RX ADMIN — SODIUM CHLORIDE: 9 INJECTION, SOLUTION INTRAVENOUS at 23:45

## 2024-01-08 RX ADMIN — ONDANSETRON 4 MG: 4 TABLET, ORALLY DISINTEGRATING ORAL at 00:30

## 2024-01-08 RX ADMIN — ROCURONIUM BROMIDE 40 MG: 10 INJECTION, SOLUTION INTRAVENOUS at 18:43

## 2024-01-08 RX ADMIN — SUGAMMADEX 100 MG: 100 INJECTION, SOLUTION INTRAVENOUS at 21:48

## 2024-01-08 RX ADMIN — Medication 50 MG: at 18:24

## 2024-01-08 RX ADMIN — ROCURONIUM BROMIDE 20 MG: 10 INJECTION, SOLUTION INTRAVENOUS at 19:58

## 2024-01-08 RX ADMIN — CEFAZOLIN 2000 MG: 2 INJECTION, POWDER, FOR SOLUTION INTRAMUSCULAR; INTRAVENOUS at 17:46

## 2024-01-08 RX ADMIN — ALBUMIN (HUMAN) 25 G: 12.5 INJECTION, SOLUTION INTRAVENOUS at 19:33

## 2024-01-08 RX ADMIN — ONDANSETRON 4 MG: 2 INJECTION INTRAMUSCULAR; INTRAVENOUS at 21:20

## 2024-01-08 RX ADMIN — PHENYLEPHRINE HYDROCHLORIDE 100 MCG: 10 INJECTION INTRAVENOUS at 18:13

## 2024-01-08 RX ADMIN — CEFTRIAXONE 1000 MG: 1 INJECTION, POWDER, FOR SOLUTION INTRAMUSCULAR; INTRAVENOUS at 15:03

## 2024-01-08 RX ADMIN — GADOTERIDOL 15 ML: 279.3 INJECTION, SOLUTION INTRAVENOUS at 09:57

## 2024-01-08 RX ADMIN — SUGAMMADEX 100 MG: 100 INJECTION, SOLUTION INTRAVENOUS at 20:11

## 2024-01-08 RX ADMIN — SODIUM CHLORIDE: 9 INJECTION, SOLUTION INTRAVENOUS at 17:50

## 2024-01-08 RX ADMIN — TAMSULOSIN HYDROCHLORIDE 0.4 MG: 0.4 CAPSULE ORAL at 10:00

## 2024-01-08 RX ADMIN — CEFAZOLIN 2000 MG: 2 INJECTION, POWDER, FOR SOLUTION INTRAMUSCULAR; INTRAVENOUS at 18:19

## 2024-01-08 RX ADMIN — OXYCODONE AND ACETAMINOPHEN 1 TABLET: 5; 325 TABLET ORAL at 02:32

## 2024-01-08 RX ADMIN — SODIUM CHLORIDE, POTASSIUM CHLORIDE, SODIUM LACTATE AND CALCIUM CHLORIDE: 600; 310; 30; 20 INJECTION, SOLUTION INTRAVENOUS at 17:50

## 2024-01-08 RX ADMIN — PHENYLEPHRINE HYDROCHLORIDE 100 MCG: 10 INJECTION INTRAVENOUS at 18:17

## 2024-01-08 RX ADMIN — PROPOFOL 200 MCG/KG/MIN: 10 INJECTION, EMULSION INTRAVENOUS at 17:55

## 2024-01-08 ASSESSMENT — LIFESTYLE VARIABLES: SMOKING_STATUS: 1

## 2024-01-08 ASSESSMENT — PAIN - FUNCTIONAL ASSESSMENT: PAIN_FUNCTIONAL_ASSESSMENT: PREVENTS OR INTERFERES SOME ACTIVE ACTIVITIES AND ADLS

## 2024-01-08 ASSESSMENT — PAIN DESCRIPTION - LOCATION
LOCATION: OTHER (COMMENT)
LOCATION: SHOULDER

## 2024-01-08 ASSESSMENT — PAIN DESCRIPTION - ORIENTATION
ORIENTATION: LEFT
ORIENTATION: LOWER

## 2024-01-08 ASSESSMENT — PAIN DESCRIPTION - PAIN TYPE
TYPE: SURGICAL PAIN
TYPE: CHRONIC PAIN;ACUTE PAIN

## 2024-01-08 ASSESSMENT — PAIN DESCRIPTION - FREQUENCY
FREQUENCY: CONTINUOUS
FREQUENCY: CONTINUOUS

## 2024-01-08 ASSESSMENT — PAIN DESCRIPTION - ONSET
ONSET: ON-GOING
ONSET: ON-GOING

## 2024-01-08 ASSESSMENT — PAIN DESCRIPTION - DESCRIPTORS
DESCRIPTORS: ACHING;SHARP
DESCRIPTORS: ACHING

## 2024-01-08 ASSESSMENT — PAIN SCALES - GENERAL
PAINLEVEL_OUTOF10: 4
PAINLEVEL_OUTOF10: 6
PAINLEVEL_OUTOF10: 9

## 2024-01-08 NOTE — PROGRESS NOTES
SLP ALL NOTES      Swallow evaluation deferred at this time as pt is NPO for possible surgery.  Will hold until cleared for PO.    Bree Strauss MA [facility] SLP  Speech Language Pathologist

## 2024-01-08 NOTE — PROGRESS NOTES
[address] [address]   Progress Note  [facility] 1 2 3 4 5      Date: 2024   Patient: Sebastian Perkins   : 1960   DOA: 2024   MRN: [id number]   ROOM#: [id number]     Admit Date: 2024     Collaborating Urologist on Call at time of admission: Dr. Palacios  Chief Complaint:   Chief Complaint   Patient presents with    Back Pain     Low back pain from nursing home. Reports chronic pain but worse today. Reports a hard time stand d/t pain     Shoulder Pain     Left shoulder, chronic pain but worse today. NKI       Subjective:     Patient seen and examined.  Resting in bed.  Indwelling Victor catheter draining well.  Color is light red in nature.    REVIEW OF SYSTEMS:     14 systems reviewed and negative except in HPI    Objective:    Vitals:    /73   Pulse (!) 130   Temp 97.8 °F (36.6 °C) (Oral)   Resp 16   Ht 1.626 m (5' 4\")   Wt 68.3 kg (150 lb 9.6 oz)   SpO2 96%   BMI 25.85 kg/m²    Temp  Av.2 °F (36.8 °C)  Min: 97.8 °F (36.6 °C)  Max: 98.7 °F (37.1 °C)     Intake/Output Summary (Last 24 hours) at 2024 1153  Last data filed at 2024 1014  Gross per 24 hour   Intake 300 ml   Output 1500 ml   Net -1200 ml       Physical Exam:   Gen: Pleasant male, in NAD  Neuro: Non-focal  Resp: Unlabored breathing  CV: Regular rate and rhythm  Abd: soft, non-distended, non-tender to palpation, no rebound  Back:   No CVAT  : Indwelling Victor catheter  Ext: No edema of bilateral LEs    Labs:   WBC:    Lab Results   Component Value Date/Time    WBC 15.0 2024 12:09 AM      Hemoglobin/Hematocrit:    Lab Results   Component Value Date/Time    HGB 10.1 2024 12:09 AM    HCT 31.6 2024 12:09 AM      BMP:   Lab Results   Component Value Date/Time     2024 12:09 AM    K 3.7 2024 12:09 AM     2024 12:09 AM    CO2 27 2024 12:09 AM    BUN 14 2024 12:09 AM    LABALBU 2.6 2024 12:09 AM    CREATININE 0.5 2024 12:09 AM

## 2024-01-08 NOTE — PROGRESS NOTES
Comprehensive Nutrition Assessment    Type and Reason for Visit:  Initial, Positive Nutrition Screen (wt loss, poor intake)    Nutrition Recommendations/Plan:   Begin a Regular Diet with standard high viviana, high protein oral nutrition supplements as timely as able     Malnutrition Assessment:  Malnutrition Status:  Insufficient data (01/08/24 1639)    Context:  Acute Illness       Nutrition Assessment:    Pt admitted from LTC with weakness, back pain. H/O CVA. He reports poor po intake and wt loss PTA. NPO for laminectomy and hardware placement, tumor to be sent for pathology. His recent wt history is n/a. Will f/u with Pt post op and continue to follow as high nutrition risk.    Nutrition Related Findings:    Glu 124   Wound Type: None       Current Nutrition Intake & Therapies:    Average Meal Intake: NPO  Average Supplements Intake: NPO  Diet NPO Exceptions are: Sips of Water with Meds    Anthropometric Measures:  Height: 162.6 cm (5' 4\")  Ideal Body Weight (IBW): 130 lbs (59 kg)    Admission Body Weight: 63.5 kg (139 lb 15.9 oz)  Current Body Weight: 68.3 kg (150 lb 9.2 oz),   IBW.    Current BMI (kg/m2): 25.8  Usual Body Weight:  (n/a)                       BMI Categories: Overweight (BMI 25.0-29.9)    Estimated Daily Nutrient Needs:  Energy Requirements Based On: Kcal/kg  Weight Used for Energy Requirements: Current  Energy (kcal/day): 0684-7511 (22-25 kcal/kg)  Weight Used for Protein Requirements: Ideal  Protein (g/day): 59-71 (1-1.2 g/kg IBW)  Method Used for Fluid Requirements: 1 ml/kcal  Fluid (ml/day): 2205-4380    Nutrition Diagnosis:   Inadequate oral intake related to pain, acute injury/trauma as evidenced by NPO or clear liquid status due to medical condition, poor intake prior to admission    Nutrition Interventions:   Food and/or Nutrient Delivery: Continue NPO  Nutrition Education/Counseling: No recommendation at this time  Coordination of Nutrition Care: Continue to monitor while inpatient

## 2024-01-08 NOTE — CARE COORDINATION
Reviewed chat, discussed in IDr and spoke with Landy form [facility]. Pt is LTC at [facility], insurance will need to be return auth.  So Landy will need notified 24-48 hrs prior to readmitting

## 2024-01-08 NOTE — PROGRESS NOTES
Per Dr HERNANDEZ, IR can hold on doing bone biopsy as patient is going to surgery to have bone mass removed. IR to complete consult at this time.

## 2024-01-08 NOTE — PROGRESS NOTES
V2.0  [facility] Hospitalist Progress Note      Name:  Sebastian Perkins /Age/Sex: 1960  (63 y.o. male)   MRN & CSN:  [id number] & [id number] Encounter Date/Time: 2024 12:33 PM EST    [facility] PCP: No primary care provider on file.       Hospital Day: 3      Subjective:     Chief Complaint:  Back Pain (Low back pain from nursing home. Reports chronic pain but worse today. Reports a hard time stand d/t pain ) and Shoulder Pain (Left shoulder, chronic pain but worse today. NKI)     Nsx saw pt, MRI ordered concern for spinal compresison, CT thoracic ordered, plan to take pt to OR 5:30pm today for laminectomy.     Pt still complains of back pain and right sided weakness. Pain is ok at time of evaluation.  Has SOB, cough.   Still has bloody urine.       Assessment and Plan:   Metastatic disease of unknown origin  Worsening back and shoulder pain.  CT abdomen/pelvis on admission with lytic lesions in right pubic bone and left iliac wing, several suspicious hepatic lesions and right hilar lymphadenopathy versus perihilar pulmonary nodules.  Also an indeterminate 2.2 cm right adrenal nodule.  There is also a 1.5 cm indeterminate right renal cystic lesion.  CT chest showed lung mass, scapula mass and thoracic fractures  -Oncology consulted  - pt going for laminectomy as below, follow up tumor pathology to be resected       Back pain likely 2/2 pathological thoracic fractures. CT showed compression injuries at T2 and T7  MRI showed pathological compression fracture T7 due to metastatis, with extention to epidural space.   - consulted neurosurgery  - continue pain regimen Percocet q4h  - plan for laminectomy and hardware placement, tumor to be sent for pathology     SIRS 2/4 on admission. Tachycardia, leukocytosis  wBC 15.9-> 15.0. no sign of infection aside from UTI as below.   Has SOB suspect 2/2 atelectasis. CXR clear on admission  WBC slightly better today 15.0  - on rocephin  - f/u blood cx, urine  SYSTEM PROVIDED HISTORY: weakness, tachycardia TECHNOLOGIST PROVIDED HISTORY: Reason for exam:->weakness, tachycardia Reason for Exam: weakness, tachycardia FINDINGS: There is some scarring noted in the right lung base.  Lungs otherwise are clear.  Heart and mediastinal structures appear normal.  Bony thorax is unremarkable.     No acute cardiopulmonary process.       Electronically signed by Renato Boland MD on 1/8/2024 at 12:37 PM

## 2024-01-08 NOTE — PROGRESS NOTES
Occupational Therapy  .    1628 hours, Nurse Soledad reports pt is going for surgery to remove bone mass. Pt will likely require re-evaluation.       Electronically signed by:    ANISHA Connors  1/8/2024, 4:34 PM

## 2024-01-08 NOTE — ANESTHESIA PRE PROCEDURE
Department of Anesthesiology  Preprocedure Note       Name:  Sebastian Perkins   Age:  63 y.o.  :  1960                                          MRN:  [id number]         Date:  2024      Surgeon: Surgeon(s):  Nallely Ragland MD    Procedure: Procedure(s):  LUMBAR LAMINECTOMY T6 TO T7 DECOMPRESSION OF TUMOR POSTERIOR POSSIBLE KYPHOPLASTY    Medications prior to admission:   Prior to Admission medications    Not on File       Current medications:    Current Facility-Administered Medications   Medication Dose Route Frequency Provider Last Rate Last Admin   • lactated ringers IV soln infusion   IntraVENous Continuous Nallely Ragland MD       • ceFAZolin (ANCEF) 2,000 mg in sodium chloride 0.9 % 50 mL IVPB (mini-bag)  2,000 mg IntraVENous On Call to OR Nallely Ragland MD       • tamsulosin (FLOMAX) capsule 0.4 mg  0.4 mg Oral Daily Lenny Branch PA-C   0.4 mg at 24 1000   • oxyCODONE-acetaminophen (PERCOCET) 5-325 MG per tablet 1 tablet  1 tablet Oral Q4H PRN Renato Boland MD   1 tablet at 24 0232   • sodium chloride flush 0.9 % injection 5-40 mL  5-40 mL IntraVENous 2 times per day Flower Crocker MD   10 mL at 24 1000   • sodium chloride flush 0.9 % injection 5-40 mL  5-40 mL IntraVENous PRN Flower Crocker MD       • 0.9 % sodium chloride infusion  500 mL IntraVENous PRN Flower Crocker MD       • [Held by provider] enoxaparin (LOVENOX) injection 40 mg  40 mg SubCUTAneous QPM Flower Crocker MD   40 mg at 24 1738   • ondansetron (ZOFRAN-ODT) disintegrating tablet 4 mg  4 mg Oral Q8H PRN Flower Crocker MD   4 mg at 24 0030    Or   • ondansetron (ZOFRAN) injection 4 mg  4 mg IntraVENous Q6H PRN Flower Crocker MD       • polyethylene glycol (GLYCOLAX) packet 17 g  17 g Oral Daily PRN Flower Crocker MD       • acetaminophen (TYLENOL) tablet 650 mg  650 mg Oral Q6H PRN Flower Crocker MD        Or   • acetaminophen (TYLENOL) suppository 650 mg  650 mg Rectal Q6H

## 2024-01-08 NOTE — ANESTHESIA PRE PROCEDURE
Department of Anesthesiology  Preprocedure Note       Name:  Sebastian Perkins   Age:  63 y.o.  :  1960                                          MRN:  [id number]         Date:  2024      Surgeon: Surgeon(s):  Nallely Ragland MD    Procedure: Procedure(s):  LUMBAR LAMINECTOMY T6 TO T7 DECOMPRESSION OF TUMOR POSTERIOR POSSIBLE KYPHOPLASTY    Medications prior to admission:   Prior to Admission medications    Not on File       Current medications:    Current Facility-Administered Medications   Medication Dose Route Frequency Provider Last Rate Last Admin   • lactated ringers IV soln infusion   IntraVENous Continuous Nallely Ragland MD       • ceFAZolin (ANCEF) 2,000 mg in sodium chloride 0.9 % 50 mL IVPB (mini-bag)  2,000 mg IntraVENous On Call to OR Nallely Ragland MD       • tamsulosin (FLOMAX) capsule 0.4 mg  0.4 mg Oral Daily Lenny Branch PA-C   0.4 mg at 24 1000   • oxyCODONE-acetaminophen (PERCOCET) 5-325 MG per tablet 1 tablet  1 tablet Oral Q4H PRN Renato Boland MD   1 tablet at 24 0232   • sodium chloride flush 0.9 % injection 5-40 mL  5-40 mL IntraVENous 2 times per day Flower Crocker MD   10 mL at 24 1000   • sodium chloride flush 0.9 % injection 5-40 mL  5-40 mL IntraVENous PRN Flower Crocker MD       • 0.9 % sodium chloride infusion  500 mL IntraVENous PRN Flower Crocker MD       • [Held by provider] enoxaparin (LOVENOX) injection 40 mg  40 mg SubCUTAneous QPM Flower Crocker MD   40 mg at 24 1738   • ondansetron (ZOFRAN-ODT) disintegrating tablet 4 mg  4 mg Oral Q8H PRN Flower Crocker MD   4 mg at 24 0030    Or   • ondansetron (ZOFRAN) injection 4 mg  4 mg IntraVENous Q6H PRN Flower Crocker MD       • polyethylene glycol (GLYCOLAX) packet 17 g  17 g Oral Daily PRN Flower Crocker MD       • acetaminophen (TYLENOL) tablet 650 mg  650 mg Oral Q6H PRN Flower Crocker MD        Or   • acetaminophen (TYLENOL) suppository 650 mg  650 mg Rectal Q6H

## 2024-01-08 NOTE — CONSULTS
Neurosurgery   Consult Note      Reason for Consult:      Came w back pain. found to have metastatic disease. CT also showed compression injuries at T2 and T7     Consulting Physician:Renato Boland MD  Attending Physician: Renato Boland MD    Date of Admission: 1/6/2024  Subjective:   CHIEF COMPLAINT: Mid back and shoulder pain    HPI:  63 y.o. 1960  Who presented to the ED 1/6/24 with complaints of worsening mid back and shoulder pain. He resides at a nursing facility after sustaining a massive right mca stroke in [place]. Timing of stroke unclear but he is noted to have changes on CT noted in 2019 He has significant weakness in his left arm and leg and baseline.  Patient tells me that he presented to the hospital due to significant mid back pain and left shoulder pain.  He states that this pain has been present for about a week and has gotten progressively worse.  He reports that he was not able to sit up in bed the day of presentation due to his pain.  He also reports worsening weakness in his right leg.  He does have residual deficits on the left side but states that after being helped into a wheelchair he is able to maneuver his wheelchair with his right arm and leg.  He states that he has not been able to use his right leg.  He states he has noticed it being significantly over the past day and a half.  Patient has been noted not to be the best historian.  He has intact sensation in his bilateral upper extremities, has decreased sensation in his bilateral lower extremities.  Unclear if the decreased sensation in his left leg is chronic like his weakness. He denies having numbness in his chest or abdomen or pelvis. He denies having numbness in his buttocks. He did have a Ivctor catheter placed for retention, he states that he could feel the catheter being in place. He reports his last bowel movement being the day before yesterday I did call his sister, Virginia, and explained the patient's current  situation. He states that she is who he would like to make medical decisions with him should he not be able to. Per the sister, the patient cannot read or write.  In the ER patient was found to have multiple metastatic lesions noted in liver, lung, left scapula, and in pelvis. Primary cancer unknown currently, Dr. Rollins is following. Patient is not on any antiplatelets or anticoagulants. PMHx positive for CVA. No past SHx listed in chart.                    Past Medical and Surgical History:       Diagnosis Date    Cerebral artery occlusion with cerebral infarction (HCC)      History reviewed. No pertinent surgical history.    Social History:    TOBACCO:   reports that he has been smoking cigarettes. He has never used smokeless tobacco.  ETOH:   reports that he does not currently use alcohol.    Family History:   History reviewed. No pertinent family history.    Current Medications:    Current Facility-Administered Medications: HYDROmorphone (DILAUDID) injection 0.5 mg, 0.5 mg, IntraVENous, Once  tamsulosin (FLOMAX) capsule 0.4 mg, 0.4 mg, Oral, Daily  oxyCODONE-acetaminophen (PERCOCET) 5-325 MG per tablet 1 tablet, 1 tablet, Oral, Q4H PRN  sodium chloride flush 0.9 % injection 5-40 mL, 5-40 mL, IntraVENous, 2 times per day  sodium chloride flush 0.9 % injection 5-40 mL, 5-40 mL, IntraVENous, PRN  0.9 % sodium chloride infusion, 500 mL, IntraVENous, PRN  enoxaparin (LOVENOX) injection 40 mg, 40 mg, SubCUTAneous, QPM  ondansetron (ZOFRAN-ODT) disintegrating tablet 4 mg, 4 mg, Oral, Q8H PRN **OR** ondansetron (ZOFRAN) injection 4 mg, 4 mg, IntraVENous, Q6H PRN  polyethylene glycol (GLYCOLAX) packet 17 g, 17 g, Oral, Daily PRN  acetaminophen (TYLENOL) tablet 650 mg, 650 mg, Oral, Q6H PRN **OR** acetaminophen (TYLENOL) suppository 650 mg, 650 mg, Rectal, Q6H PRN  cefTRIAXone (ROCEPHIN) 1,000 mg in sodium chloride 0.9 % 50 mL IVPB (mini-bag), 1,000 mg, IntraVENous, Q24H    No Known Allergies     REVIEW OF SYSTEMS:   results were sent to the CORE Team on 1/8/2024 at 9:49 am to be  communicated to the referring/covering health care provider/office.    Assessment:   Concern for pathologic T2 and T7 fracture  Concern for epidural extension,   Right lower lobe lung mass  Liver lesions  Left scapular mass  Right adrenal nodule  Right renal cystic lesion  Hx of right MCA stroke  Plan:    Patient who presented to [facility] from his nursing facility with complaints of severe mid back pain and scapular pain that has gotten worse over the past week. He was found to have a T2 and T7 pathologic fracture on CT imaging with concern of tumor and epidural extension. MRI thoracic w/wo contrast today showing T7 pathologic fracture with lesion of left posterior elements of T7 extending into the epidural space. There is severe spinal stenosis at T7 with cord signal change noted. I did review his mris with him and discussed the need for decompression. He states he understands and is agreeable. I did call his sister who he voiced he would like to be his POA and updated her on the plan. She states that she will be here at 5pm, which is the current scheduled case time. Dr. Ragland reviewed imaging, patient is scheduled for a left T6-8 laminectomy for decompression of epidural tumor, T4-10 percutaneous instrumentation. He did have to have a garcia placed for retention, did have prostamegaly on CT abdomen. Patient had sensation of rectal exam, normal tone at rest. He has his baseline weakness in his left arm and leg, has new weakness in his right leg that is concerning. Patient NPO starting at 10am. Hold lovenox. Patient is receiving rocephen for possible UTI, today is day 3 of dosing. Blood cultures negative in 24 hours. Hospitalist and oncology made aware of plan. EKG showing possible inferior infacrt and sinus tachycardia. Cardiology consulted. Dr. Rollins is following, primary carcinoma unknown. PSA was normal. He has multiple lesions in the liver, lung, kidney,

## 2024-01-08 NOTE — PROGRESS NOTES
Patient Name:  Sebastian Perkins  Patient :  1960  Patient MRN:  [id number]     Primary Oncologist: Jose Rollins MD  Referring Provider: No primary care provider on file.     Date of Service: 2024      Reason for Consult: To evaluate the patient with several suspicious liver lesions, right adrenal nodule, right renal cystic lesion, right pubic bone and left iliac wing lytic lesions, right hilar lesions/lymphadenopathy.      Chief Complaint:    Chief Complaint   Patient presents with    Back Pain     Low back pain from nursing home. Reports chronic pain but worse today. Reports a hard time stand d/t pain     Shoulder Pain     Left shoulder, chronic pain but worse today. NKI     Patient Active Problem List:     Weakness    HPI:   Sebastian Perkins is a 63 y.o. male with medical history significant for h/o stroke with left sided weakness, presented to [facility] on 24 after he was unable to sit up and fell back in bed.      Patient is limited with his mobility at baseline due to prior stroke which is immobilized his left upper and lower extremities.  Uses a wheelchair at baseline.  Reports back/shoulder pain.      CT abdomen/pelvis on 24 showed metastatic disease of uncertain origin with lytic lesions in the right pubic bone and left iliac wing, several suspicious hepatic lesions, and right hilar lymphadenopathy versus perihilar pulmonary nodules. 2.2 cm indeterminate right adrenal nodule.   Moderate prostatomegaly with moderate urinary bladder distension. Layering calculi within the urinary bladder lumen.1.5 cm indeterminate right renal cystic lesion. Suspected post treatment changes in the superior pole of the left kidney.     I was called to evaluate him. He has lost weight lately. He is a smoker and he is a residence of [facility].     CT Chest 2023 showed compression injuries at T2, T7, destructive mass at T6/T7 with posterior destruction of vertebral body, invasion into spinal canal and epidural space,  lesion.  Consider further   evaluation with a renal protocol MRI or CT.  Suspected post treatment changes   in the superior pole of the left kidney.  Recommend correlation with previous   intervention.      RECOMMENDATIONS:   2.2 cm right adrenal mass, probable benign adenoma. Recommend adrenal washout   CT or chemical shift MRI.      JACR 2017 Aug; 14(8):1038-44, JCAT 2016 Mar-Apr; 40(2):194-200, Urol J 2006   Spring; 3(2):71-4.      1.8 cm right solid pulmonary nodule detected on incomplete chest CT. Per   Fleischner Society Guidelines, recommend prompt non-contrast Chest CT for   further evaluation.      These guidelines do not apply to immunocompromised patients and patients with   cancer. Follow up in patients with significant comorbidities as clinically   warranted. For lung cancer screening, adhere to Lung-RADS guidelines.   Reference: Radiology. 2017; 284(1):228-43.            XR CHEST PORTABLE   Final Result   No acute cardiopulmonary process.         MRI THORACIC SPINE W WO CONTRAST    (Results Pending)       Observations:  No data recorded     Assessment:  Several suspicious liver lesions  Right adrenal nodule  Right renal cystic lesion  Right pubic bone and left iliac wing lytic lesions  Right hilar lesions/lymphadenopathy  Prostatomegaly    Plan:  I reviewed his CT images today. CT findings are concerning for metastatic disease. 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  Discrete mass in RLL 1.6 x 1.6 cm.  Largest mass Is on L scapula measuring 5.9 x 8.2 cm. Also noted  compression injuries at T2 and T7 with destructive mass at level of T6-T7 with destruction of vertebral body and posterior elements as well as invasion into spinal canal and epidural space.     Tumor markers ordered, CA 19-9 and AFP remain pending.  Reviewed with him about having CT guided biopsy and he is agreeable. CT guided biopsy of either

## 2024-01-08 NOTE — PROGRESS NOTES
Unfortunately POA paperwork could not be accomplished today before his procedure. Patient verbally said to me and his nurse that he would like his ex spouse, Jackie, to be his POA. I relayed this to both his sisters who did not voice any concerns. His sisters will be present during consent and will assist him in understanding the procedure when the surgeon speaks with him.     Since POA paperwork has not been completed, POA will be his sister Virginia. The patient understands and is in agreement with this.

## 2024-01-08 NOTE — PROGRESS NOTES
Thoracic MRI shows dorsal extradural mass at T7 more on the left than the right with spinal cord compression.  The mass extends into the left retropleural space and into the posterior paraspinal musculature.  Thoracic CT shows extensive erosion of the left T7 pedicle, seventh rib head, and left T7 hemilamina.    My recommendation is T6-T8 laminectomies for decompression of the spinal cord and T4-T10 cutaneous instrumentation.  The risks of surgery discussed with the patient and his 2 sisters.  All questions were answered to their satisfaction.  The patient himself has consented to surgery.  He signed using an \"X\" and his sister also signed on his behalf.                Nallely Ragland MD, JAYDON, FAANS  Board Certified Neurosurgeon  Minimally Invasive Spine Surgeon  President of [employer].    Phone: [phone] ([phone])  Fax: [phone]  Website: [email or web address]

## 2024-01-09 ENCOUNTER — APPOINTMENT (OUTPATIENT)
Dept: GENERAL RADIOLOGY | Age: 64
End: 2024-01-09
Payer: COMMERCIAL

## 2024-01-09 ENCOUNTER — APPOINTMENT (OUTPATIENT)
Dept: NON INVASIVE DIAGNOSTICS | Age: 64
End: 2024-01-09
Attending: INTERNAL MEDICINE
Payer: COMMERCIAL

## 2024-01-09 PROBLEM — G95.29 SPINAL CORD COMPRESSION DUE TO MALIGNANT NEOPLASM METASTATIC TO SPINE (HCC): Status: ACTIVE | Noted: 2024-01-09

## 2024-01-09 PROBLEM — A41.9 SEPTICEMIA (HCC): Status: ACTIVE | Noted: 2024-01-09

## 2024-01-09 PROBLEM — S22.069A T7 VERTEBRAL FRACTURE (HCC): Status: ACTIVE | Noted: 2024-01-09

## 2024-01-09 PROBLEM — C79.51 SPINAL CORD COMPRESSION DUE TO MALIGNANT NEOPLASM METASTATIC TO SPINE (HCC): Status: ACTIVE | Noted: 2024-01-09

## 2024-01-09 LAB
AFP-TM SERPL-MCNC: 2 NG/ML (ref 0–9)
ALBUMIN SERPL-MCNC: 2.9 GM/DL (ref 3.4–5)
ALP BLD-CCNC: 85 IU/L (ref 40–128)
ALT SERPL-CCNC: 10 U/L (ref 10–40)
ANION GAP SERPL CALCULATED.3IONS-SCNC: 8 MMOL/L (ref 7–16)
AST SERPL-CCNC: 21 IU/L (ref 15–37)
BASOPHILS ABSOLUTE: 0 K/CU MM
BASOPHILS RELATIVE PERCENT: 0.1 % (ref 0–1)
BILIRUB SERPL-MCNC: 0.3 MG/DL (ref 0–1)
BUN SERPL-MCNC: 12 MG/DL (ref 6–23)
CALCIUM SERPL-MCNC: 8.1 MG/DL (ref 8.3–10.6)
CANCER AG19-9 SERPL IA-ACNC: 4 U/ML
CHLORIDE BLD-SCNC: 110 MMOL/L (ref 99–110)
CO2: 25 MMOL/L (ref 21–32)
CREAT SERPL-MCNC: 0.4 MG/DL (ref 0.9–1.3)
DIFFERENTIAL TYPE: ABNORMAL
ECHO AO ROOT DIAM: 3 CM
ECHO AO ROOT INDEX: 1.73 CM/M2
ECHO AV AREA PEAK VELOCITY: 3.2 CM2
ECHO AV AREA VTI: 2.9 CM2
ECHO AV AREA/BSA PEAK VELOCITY: 1.8 CM2/M2
ECHO AV AREA/BSA VTI: 1.7 CM2/M2
ECHO AV MEAN GRADIENT: 5 MMHG
ECHO AV MEAN VELOCITY: 1 M/S
ECHO AV PEAK GRADIENT: 8 MMHG
ECHO AV PEAK VELOCITY: 1.4 M/S
ECHO AV VELOCITY RATIO: 0.93
ECHO AV VTI: 19.5 CM
ECHO BSA: 1.76 M2
ECHO IVC PROX: 1.4 CM
ECHO LA AREA 4C: 12.9 CM2
ECHO LA DIAMETER INDEX: 1.68 CM/M2
ECHO LA DIAMETER: 2.9 CM
ECHO LA MAJOR AXIS: 5.1 CM
ECHO LA TO AORTIC ROOT RATIO: 0.97
ECHO LA VOL MOD A4C: 28 ML (ref 18–58)
ECHO LA VOLUME INDEX MOD A4C: 16 ML/M2 (ref 16–34)
ECHO LV E' LATERAL VELOCITY: 14 CM/S
ECHO LV EDV A4C: 90 ML
ECHO LV EDV INDEX A4C: 52 ML/M2
ECHO LV EJECTION FRACTION A4C: 66 %
ECHO LV ESV A4C: 31 ML
ECHO LV ESV INDEX A4C: 18 ML/M2
ECHO LV FRACTIONAL SHORTENING: 45 % (ref 28–44)
ECHO LV INTERNAL DIMENSION DIASTOLE INDEX: 2.31 CM/M2
ECHO LV INTERNAL DIMENSION DIASTOLIC: 4 CM (ref 4.2–5.9)
ECHO LV INTERNAL DIMENSION SYSTOLIC INDEX: 1.27 CM/M2
ECHO LV INTERNAL DIMENSION SYSTOLIC: 2.2 CM
ECHO LV IVSD: 0.7 CM (ref 0.6–1)
ECHO LV MASS 2D: 85.8 G (ref 88–224)
ECHO LV MASS INDEX 2D: 49.6 G/M2 (ref 49–115)
ECHO LV POSTERIOR WALL DIASTOLIC: 0.8 CM (ref 0.6–1)
ECHO LV RELATIVE WALL THICKNESS RATIO: 0.4
ECHO LVOT AREA: 3.5 CM2
ECHO LVOT AV VTI INDEX: 0.84
ECHO LVOT DIAM: 2.1 CM
ECHO LVOT MEAN GRADIENT: 4 MMHG
ECHO LVOT PEAK GRADIENT: 7 MMHG
ECHO LVOT PEAK VELOCITY: 1.3 M/S
ECHO LVOT STROKE VOLUME INDEX: 32.8 ML/M2
ECHO LVOT SV: 56.8 ML
ECHO LVOT VTI: 16.4 CM
ECHO MV E VELOCITY: 0.87 M/S
ECHO MV E/E' LATERAL: 6.21
ECHO RV MID DIMENSION: 2.4 CM
EOSINOPHILS ABSOLUTE: 0 K/CU MM
EOSINOPHILS RELATIVE PERCENT: 0 % (ref 0–3)
GFR SERPL CREATININE-BSD FRML MDRD: >60 ML/MIN/1.73M2
GLUCOSE SERPL-MCNC: 139 MG/DL (ref 70–99)
HCT VFR BLD CALC: 24.8 % (ref 42–52)
HCT VFR BLD CALC: 25.9 % (ref 42–52)
HEMOGLOBIN: 7.6 GM/DL (ref 13.5–18)
HEMOGLOBIN: 8.1 GM/DL (ref 13.5–18)
IMMATURE NEUTROPHIL %: 0.5 % (ref 0–0.43)
LYMPHOCYTES ABSOLUTE: 0.8 K/CU MM
LYMPHOCYTES RELATIVE PERCENT: 5.8 % (ref 24–44)
MCH RBC QN AUTO: 28.1 PG (ref 27–31)
MCHC RBC AUTO-ENTMCNC: 31.3 % (ref 32–36)
MCV RBC AUTO: 89.9 FL (ref 78–100)
MONOCYTES ABSOLUTE: 0.9 K/CU MM
MONOCYTES RELATIVE PERCENT: 6.6 % (ref 0–4)
NUCLEATED RBC %: 0 %
PDW BLD-RTO: 15.4 % (ref 11.7–14.9)
PLATELET # BLD: 355 K/CU MM (ref 140–440)
PMV BLD AUTO: 10.1 FL (ref 7.5–11.1)
POTASSIUM SERPL-SCNC: 4 MMOL/L (ref 3.5–5.1)
RBC # BLD: 2.88 M/CU MM (ref 4.6–6.2)
SEGMENTED NEUTROPHILS ABSOLUTE COUNT: 11.5 K/CU MM
SEGMENTED NEUTROPHILS RELATIVE PERCENT: 87 % (ref 36–66)
SODIUM BLD-SCNC: 143 MMOL/L (ref 135–145)
TOTAL IMMATURE NEUTOROPHIL: 0.06 K/CU MM
TOTAL NUCLEATED RBC: 0 K/CU MM
TOTAL PROTEIN: 4.9 GM/DL (ref 6.4–8.2)
TSH SERPL DL<=0.005 MIU/L-ACNC: 0.28 UIU/ML (ref 0.27–4.2)
WBC # BLD: 13.2 K/CU MM (ref 4–10.5)

## 2024-01-09 PROCEDURE — 6370000000 HC RX 637 (ALT 250 FOR IP): Performed by: PHYSICIAN ASSISTANT

## 2024-01-09 PROCEDURE — 85025 COMPLETE CBC W/AUTO DIFF WBC: CPT

## 2024-01-09 PROCEDURE — 2580000003 HC RX 258: Performed by: NEUROLOGICAL SURGERY

## 2024-01-09 PROCEDURE — 99232 SBSQ HOSP IP/OBS MODERATE 35: CPT | Performed by: INTERNAL MEDICINE

## 2024-01-09 PROCEDURE — 97168 OT RE-EVAL EST PLAN CARE: CPT

## 2024-01-09 PROCEDURE — 36415 COLL VENOUS BLD VENIPUNCTURE: CPT

## 2024-01-09 PROCEDURE — 80048 BASIC METABOLIC PNL TOTAL CA: CPT

## 2024-01-09 PROCEDURE — 6360000002 HC RX W HCPCS: Performed by: STUDENT IN AN ORGANIZED HEALTH CARE EDUCATION/TRAINING PROGRAM

## 2024-01-09 PROCEDURE — 6370000000 HC RX 637 (ALT 250 FOR IP): Performed by: STUDENT IN AN ORGANIZED HEALTH CARE EDUCATION/TRAINING PROGRAM

## 2024-01-09 PROCEDURE — 97112 NEUROMUSCULAR REEDUCATION: CPT

## 2024-01-09 PROCEDURE — 2700000000 HC OXYGEN THERAPY PER DAY

## 2024-01-09 PROCEDURE — 93306 TTE W/DOPPLER COMPLETE: CPT

## 2024-01-09 PROCEDURE — 6360000002 HC RX W HCPCS: Performed by: NEUROLOGICAL SURGERY

## 2024-01-09 PROCEDURE — 2580000003 HC RX 258: Performed by: STUDENT IN AN ORGANIZED HEALTH CARE EDUCATION/TRAINING PROGRAM

## 2024-01-09 PROCEDURE — 93306 TTE W/DOPPLER COMPLETE: CPT | Performed by: INTERNAL MEDICINE

## 2024-01-09 PROCEDURE — 97530 THERAPEUTIC ACTIVITIES: CPT

## 2024-01-09 PROCEDURE — 72070 X-RAY EXAM THORAC SPINE 2VWS: CPT

## 2024-01-09 PROCEDURE — 94150 VITAL CAPACITY TEST: CPT

## 2024-01-09 PROCEDURE — 85018 HEMOGLOBIN: CPT

## 2024-01-09 PROCEDURE — 94761 N-INVAS EAR/PLS OXIMETRY MLT: CPT

## 2024-01-09 PROCEDURE — 80053 COMPREHEN METABOLIC PANEL: CPT

## 2024-01-09 PROCEDURE — 2000000000 HC ICU R&B

## 2024-01-09 PROCEDURE — 92610 EVALUATE SWALLOWING FUNCTION: CPT | Performed by: SPEECH-LANGUAGE PATHOLOGIST

## 2024-01-09 PROCEDURE — 6360000002 HC RX W HCPCS: Performed by: ANESTHESIOLOGY

## 2024-01-09 PROCEDURE — 85014 HEMATOCRIT: CPT

## 2024-01-09 PROCEDURE — 99253 IP/OBS CNSLTJ NEW/EST LOW 45: CPT | Performed by: INTERNAL MEDICINE

## 2024-01-09 PROCEDURE — 2580000003 HC RX 258: Performed by: ANESTHESIOLOGY

## 2024-01-09 PROCEDURE — 97164 PT RE-EVAL EST PLAN CARE: CPT

## 2024-01-09 PROCEDURE — 99024 POSTOP FOLLOW-UP VISIT: CPT | Performed by: PHYSICIAN ASSISTANT

## 2024-01-09 PROCEDURE — 84443 ASSAY THYROID STIM HORMONE: CPT

## 2024-01-09 PROCEDURE — 6370000000 HC RX 637 (ALT 250 FOR IP): Performed by: NEUROLOGICAL SURGERY

## 2024-01-09 RX ORDER — SODIUM CHLORIDE, SODIUM LACTATE, POTASSIUM CHLORIDE, CALCIUM CHLORIDE 600; 310; 30; 20 MG/100ML; MG/100ML; MG/100ML; MG/100ML
INJECTION, SOLUTION INTRAVENOUS CONTINUOUS
Status: DISCONTINUED | OUTPATIENT
Start: 2024-01-09 | End: 2024-01-12

## 2024-01-09 RX ORDER — GABAPENTIN 100 MG/1
100 CAPSULE ORAL EVERY 8 HOURS SCHEDULED
Status: DISCONTINUED | OUTPATIENT
Start: 2024-01-09 | End: 2024-01-19 | Stop reason: HOSPADM

## 2024-01-09 RX ORDER — OXYCODONE HYDROCHLORIDE 5 MG/1
5 TABLET ORAL EVERY 4 HOURS PRN
Status: DISCONTINUED | OUTPATIENT
Start: 2024-01-09 | End: 2024-01-19 | Stop reason: HOSPADM

## 2024-01-09 RX ORDER — ACETAMINOPHEN 500 MG
1000 TABLET ORAL EVERY 8 HOURS SCHEDULED
Status: DISCONTINUED | OUTPATIENT
Start: 2024-01-09 | End: 2024-01-19 | Stop reason: HOSPADM

## 2024-01-09 RX ORDER — ENOXAPARIN SODIUM 100 MG/ML
40 INJECTION SUBCUTANEOUS DAILY
Status: DISCONTINUED | OUTPATIENT
Start: 2024-01-10 | End: 2024-01-19 | Stop reason: HOSPADM

## 2024-01-09 RX ORDER — OXYCODONE HYDROCHLORIDE 10 MG/1
10 TABLET ORAL EVERY 4 HOURS PRN
Status: DISCONTINUED | OUTPATIENT
Start: 2024-01-09 | End: 2024-01-19 | Stop reason: HOSPADM

## 2024-01-09 RX ADMIN — OXYCODONE HYDROCHLORIDE 10 MG: 10 TABLET ORAL at 10:12

## 2024-01-09 RX ADMIN — TAMSULOSIN HYDROCHLORIDE 0.4 MG: 0.4 CAPSULE ORAL at 09:05

## 2024-01-09 RX ADMIN — SODIUM CHLORIDE, POTASSIUM CHLORIDE, SODIUM LACTATE AND CALCIUM CHLORIDE: 600; 310; 30; 20 INJECTION, SOLUTION INTRAVENOUS at 22:13

## 2024-01-09 RX ADMIN — ACETAMINOPHEN 1000 MG: 500 TABLET ORAL at 22:05

## 2024-01-09 RX ADMIN — Medication 10 ML: at 09:05

## 2024-01-09 RX ADMIN — SENNOSIDES, DOCUSATE SODIUM 1 TABLET: 8.6; 5 TABLET ORAL at 09:05

## 2024-01-09 RX ADMIN — PHENYLEPHRINE HYDROCHLORIDE 15 MCG/MIN: 10 INJECTION INTRAVENOUS at 01:56

## 2024-01-09 RX ADMIN — OXYCODONE HYDROCHLORIDE 10 MG: 10 TABLET ORAL at 19:12

## 2024-01-09 RX ADMIN — CEFTRIAXONE 1000 MG: 1 INJECTION, POWDER, FOR SOLUTION INTRAMUSCULAR; INTRAVENOUS at 12:40

## 2024-01-09 RX ADMIN — GABAPENTIN 100 MG: 100 CAPSULE ORAL at 22:05

## 2024-01-09 RX ADMIN — SODIUM CHLORIDE, POTASSIUM CHLORIDE, SODIUM LACTATE AND CALCIUM CHLORIDE: 600; 310; 30; 20 INJECTION, SOLUTION INTRAVENOUS at 10:20

## 2024-01-09 RX ADMIN — SENNOSIDES, DOCUSATE SODIUM 1 TABLET: 8.6; 5 TABLET ORAL at 22:05

## 2024-01-09 RX ADMIN — OXYCODONE AND ACETAMINOPHEN 2 TABLET: 5; 325 TABLET ORAL at 05:22

## 2024-01-09 RX ADMIN — SODIUM CHLORIDE, PRESERVATIVE FREE 10 ML: 5 INJECTION INTRAVENOUS at 22:06

## 2024-01-09 RX ADMIN — FENTANYL CITRATE 50 MCG: 50 INJECTION, SOLUTION INTRAMUSCULAR; INTRAVENOUS at 00:19

## 2024-01-09 RX ADMIN — SODIUM CHLORIDE, PRESERVATIVE FREE 10 ML: 5 INJECTION INTRAVENOUS at 09:18

## 2024-01-09 RX ADMIN — ACETAMINOPHEN 1000 MG: 500 TABLET ORAL at 15:55

## 2024-01-09 RX ADMIN — Medication 10 ML: at 22:06

## 2024-01-09 RX ADMIN — GABAPENTIN 100 MG: 100 CAPSULE ORAL at 15:55

## 2024-01-09 ASSESSMENT — PAIN SCALES - WONG BAKER: WONGBAKER_NUMERICALRESPONSE: 6

## 2024-01-09 ASSESSMENT — PAIN DESCRIPTION - LOCATION
LOCATION: BACK
LOCATION: SHOULDER;BACK
LOCATION: BACK
LOCATION: BACK
LOCATION: SHOULDER
LOCATION: BACK
LOCATION: CHEST

## 2024-01-09 ASSESSMENT — PAIN SCALES - GENERAL
PAINLEVEL_OUTOF10: 6
PAINLEVEL_OUTOF10: 9
PAINLEVEL_OUTOF10: 6
PAINLEVEL_OUTOF10: 6
PAINLEVEL_OUTOF10: 3
PAINLEVEL_OUTOF10: 9
PAINLEVEL_OUTOF10: 4
PAINLEVEL_OUTOF10: 10
PAINLEVEL_OUTOF10: 9
PAINLEVEL_OUTOF10: 9

## 2024-01-09 ASSESSMENT — PAIN DESCRIPTION - PAIN TYPE
TYPE: SURGICAL PAIN
TYPE: SURGICAL PAIN;ACUTE PAIN

## 2024-01-09 ASSESSMENT — PAIN - FUNCTIONAL ASSESSMENT
PAIN_FUNCTIONAL_ASSESSMENT: PREVENTS OR INTERFERES SOME ACTIVE ACTIVITIES AND ADLS

## 2024-01-09 ASSESSMENT — PAIN DESCRIPTION - DESCRIPTORS
DESCRIPTORS: ACHING;THROBBING
DESCRIPTORS: ACHING;THROBBING
DESCRIPTORS: ACHING
DESCRIPTORS: ACHING;THROBBING
DESCRIPTORS: ACHING;THROBBING

## 2024-01-09 ASSESSMENT — PAIN DESCRIPTION - ONSET: ONSET: ON-GOING

## 2024-01-09 ASSESSMENT — PAIN DESCRIPTION - FREQUENCY: FREQUENCY: CONTINUOUS

## 2024-01-09 ASSESSMENT — PAIN DESCRIPTION - ORIENTATION
ORIENTATION: RIGHT
ORIENTATION: MID;RIGHT
ORIENTATION: LOWER
ORIENTATION: RIGHT;MID
ORIENTATION: RIGHT

## 2024-01-09 NOTE — CONSULTS
CARDIOLOGY CONSULT NOTE   Reason for consultation: Tachycardia abnormal EKG preoperative management    Referring physician:  Flower Crocker MD     Primary care physician: No primary care provider on file.      Dear  Dr. Flower Crocker MD   Thanks for the consult.    Chief Complaints :  Chief Complaint   Patient presents with    Back Pain     Low back pain from nursing home. Reports chronic pain but worse today. Reports a hard time stand d/t pain     Shoulder Pain     Left shoulder, chronic pain but worse today. NKI        History of present illness:Sebastian is a 63 y.o.year old who has previous history of previous history of stroke resulting in left-sided weakness he was brought in urgently due to inability to sit up and was found to have metastatic disease with lytic lesions causing compression at thoracic spine level he underwent emergent decompression surgery cardiology was asked to evaluate him due to concerns for tachycardia and abnormal EKG  He is a nursing home resident with primary cancer of unknown origin with mets    Past medical history:    has a past medical history of Cerebral artery occlusion with cerebral infarction (HCC).  Past surgical history:   has no past surgical history on file.  Social History:   reports that he has been smoking cigarettes. He has never used smokeless tobacco. He reports that he does not currently use alcohol. He reports that he does not use drugs.  Family history:   no family history of CAD, STROKE of DM at early age    No Known Allergies    [START ON 1/10/2024] enoxaparin (LOVENOX) injection 40 mg, Daily  lactated ringers IV soln infusion, Continuous  acetaminophen (TYLENOL) tablet 1,000 mg, 3 times per day  oxyCODONE (ROXICODONE) immediate release tablet 5 mg, Q4H PRN   Or  oxyCODONE HCl (OXY-IR) immediate release tablet 10 mg, Q4H PRN  gabapentin (NEURONTIN) capsule 100 mg, 3 times per day  sodium chloride flush 0.9 % injection 5-40 mL, 2 times per day  sodium chloride  tachycardia FINDINGS: There is some scarring noted in the right lung base.  Lungs otherwise are clear.  Heart and mediastinal structures appear normal.  Bony thorax is unremarkable.     No acute cardiopulmonary process.       All labs, medications and tests reviewed by myself including data  from outside source , patient and available family .  Continue all other medications of all above medical condition listed as is.     Impression:  Principal Problem:    Spinal cord compression due to malignant neoplasm metastatic to spine (HCC)  Active Problems:    Weakness    Metastatic malignant neoplasm (HCC)    T7 vertebral fracture (HCC)  Resolved Problems:    * No resolved hospital problems. *      Assessment: 63 y.o.year old with PMH of  has a past medical history of Cerebral artery occlusion with cerebral infarction (HCC).      Plan and Recommendations:    Tachycardia in setting of pain and recent surgery start metoprolol 25 twice daily if blood pressure allows  Get echo and TSH  Malignancy workup as per primary team  HTN: stable, continue present medications   DVT prophylaxis if no contraindication  6.   Dyslipidemia: continue statins           Thank you  much for consult and giving us the opportunity in contributing in the care of this patient. Please feel free to call me for any questions.       Sayed Nicholas Bal MD, 1/9/2024 12:45 PM

## 2024-01-09 NOTE — PROGRESS NOTES
Neurosurgery Progress Note  1/9/2024 8:44 AM      Left T6-8 laminectomy, t5-9 percutaneous instrumentation and fusion  POD: 0    Assessment and Plan:   S/P thoracic fusion and epidural tumor decompression- multiple specimens sent to pathology, frozen specimen returning as malignant. Final path report pending. Keep MAP>85 for 72 hours. Continue to monitor patient, switch to every 4 hours neurochecks later today at 10pm. CBC 8.1 this am, will draw repeat later this afternoon. Incentive spirometer encouraged. Keep patient on sides and turn frequently. Do not lay on back.   Metastatic disease- unknown primary, oncology, Dr. Rollins on board. Have consulted rad once. Will need radiation after incisions heal, ideally 2 weeks post-op.   Hx of right MCA stroke- patient with significant left upper and lower extremity after stroke in [place] several years ago. Has been residing in a nursing facility since.     Supervising physician: Nallely Ragland MD.  Dr. Ragland was readily and continuously available by phone for direct consultation regarding the care of this patient.    Dragon dictation may have been used in the writing of this note. Spelling or word errors may have occurred. Please call for clarification if there are concerns.      Subjective:   Admit Date: 1/6/2024  PCP: No primary care provider on file.    Patient laying in bed. Complains of right shoulder pain. Has no numbness in his right arm. He feels some burning pain in his right foot.     Data:   Scheduled Meds:   sodium chloride flush  5-40 mL IntraVENous 2 times per day    polyethylene glycol  17 g Oral Daily    sennosides-docusate sodium  1 tablet Oral BID    tamsulosin  0.4 mg Oral Daily    sodium chloride flush  5-40 mL IntraVENous 2 times per day    [Held by provider] enoxaparin  40 mg SubCUTAneous QPM    cefTRIAXone (ROCEPHIN) IV  1,000 mg IntraVENous Q24H         I/O last 3 completed shifts:  In: 2446.1 [P.O.:240; I.V.:1606.1; Other:50; IV Piggyback:550]  Out:

## 2024-01-09 NOTE — PROGRESS NOTES
4 Eyes Skin Assessment     NAME:  Sebastian Perkins  YOB: 1960  MEDICAL RECORD NUMBER:  [id number]    The patient is being assessed for  Admission    I agree that at least one RN has performed a thorough Head to Toe Skin Assessment on the patient. ALL assessment sites listed below have been assessed.      Areas assessed by both nurses:    Head, Face, Ears, Shoulders, Back, Chest, Arms, Elbows, Hands, Sacrum. Buttock, Coccyx, Ischium, Legs. Feet and Heels, and Under Medical Devices         Does the Patient have a Wound? No noted wound(s)       Domenico Prevention initiated by RN: Yes  Wound Care Orders initiated by RN: No    Pressure Injury (Stage 3,4, Unstageable, DTI, NWPT, and Complex wounds) if present, place Wound referral order by RN under : No    New Ostomies, if present place, Ostomy referral order under : No     Nurse 1 eSignature: Electronically signed by Binta Rubio RN on 1/9/24 at 12:59 AM EST    **SHARE this note so that the co-signing nurse can place an eSignature**    Nurse 2 eSignature: {Esignature:[id number]}

## 2024-01-09 NOTE — DISCHARGE INSTRUCTIONS
Thoracic Fusion Discharge Instructions  You are being given this information on Thoracic Fusion as part of your discharge instructions.  You have just undergone back surgery.  The goal of this operation was to relieve the pressure on the nerves in your back, reducing back and leg pain.  There are now several steps you can do which will help speed your recovery as well as, give you every chance for the best recovery.  WHAT TO EXPECT  It takes six (6) to twelve (12) months for nerves to heal.  During this time, you may experience numbness and tingling.  This will not impair function and should improve over time.  Some continuing back and leg pain are not unusual during the first few days and weeks following surgery.  It is normal for some blood to ooze from your incision for the first several days.  Remember, you had major surgery, and it is normal to feel tired for several days.  If you do feel tired, stop and rest.  Do not push yourself.  You can expect an x-ray of your back to be taken before each post operative appointment.  ACTIVITIES  Until you are seen in the office for your first post-operative visit, we advise you to take it easy.  This does not mean bed rest but resuming your normal activities during this period is not recommended.  Avoid bending, lifting, and twisting.  No bending below countertop height.  No lifting more than fifteen (15) pounds.   No excessive twisting at waist or reaching for things above the head.  Back brace to be worn when sitting upright, standing, or walking.   Heavy housework and yard work is not recommended.  Avoid sitting in a straight back chair for periods of time longer than 45 minutes, 3-4 times per day.  DO NOT jog or run.  Daily walking outdoors or at a shopping mall is recommended.  Gradually increase your walking time.  Let pain or discomfort be your guide.  You may walk up and down stairs as often as you like but go slowly and use handrail for support.  Sexual activity  immediately.   If you develop a fever greater than 100 degrees.   If your pain is not controlled.   If you develop a severe headache or stiff neck.    FOLLOW-UP APPOINTMENTS  Your 6 week, 12 week, and 6 month follow-up appointments have already been scheduled.  Before each appointment, you will need to obtain thoracic X-Ray for Dr. Ragland to review.   QUESTIONS  If you have questions call the office at [phone].  You are a very important part of your recovery.  Please ask Dr. Ragland or nurse if you have any questions regarding your discharge instruction.

## 2024-01-09 NOTE — PROGRESS NOTES
V2.0  [facility] [place] Note      Name:  Sebastian Perkins /Age/Sex: 1960  (63 y.o. male)   MRN & CSN:  [id number] & [id number] Encounter Date/Time: 2024 12:33 PM EST    Location:  -A PCP: No primary care provider on file.       Hospital Day: 4      Subjective:     Chief Complaint:  Back Pain (Low back pain from nursing home. Reports chronic pain but worse today. Reports a hard time stand d/t pain ) and Shoulder Pain (Left shoulder, chronic pain but worse today. NKI)     Nsx saw pt, MRI ordered concern for spinal compresison, CT thoracic ordered, plan to take pt to OR 5:30pm today for laminectomy.     Pt still complains of back pain and right sided weakness. Pain is ok at time of evaluation.  Has SOB, cough.   Still has bloody urine.       Assessment and Plan:   Metastatic disease of unknown origin  Worsening back and shoulder pain.  CT abdomen/pelvis on admission with lytic lesions in right pubic bone and left iliac wing, several suspicious hepatic lesions and right hilar lymphadenopathy versus perihilar pulmonary nodules.  Also an indeterminate 2.2 cm right adrenal nodule.  There is also a 1.5 cm indeterminate right renal cystic lesion.  CT chest showed lung mass, scapula mass and thoracic fractures  -Oncology onboard    Back pain likely 2/2 pathological thoracic fractures. CT showed compression injuries at T2 and T7  MRI showed pathological compression fracture T7 due to metastatis, with extention to epidural space.   -Neurosurgery onboard  -continue pain regimen Percocet q4h  -s/p thoracic fusion and epidural tumor decompression  -follow pathology report     SIRS 2/4 on admission. Tachycardia, leukocytosis  wBC 15.9-> 15.0. no sign of infection aside from UTI as below.   Has SOB suspect 2/2 atelectasis. CXR clear on admission  -on rocephin  -f/u blood cx - negative to date, urine cx showing mixed growth  -pro-viviana 0.447    UTI  Generalized weakness. Denies urinary symptoms has  0.06 K/CU MM    Immature Neutrophil % 0.5 (H) 0 - 0.43 %        Imaging/Diagnostics Last 24 Hours   CT CHEST W CONTRAST    Result Date: 1/7/2024  EXAMINATION: CT OF THE CHEST WITH CONTRAST 1/7/2024 10:16 am TECHNIQUE: CT of the chest was performed with the administration of intravenous contrast. Multiplanar reformatted images are provided for review. Automated exposure control, iterative reconstruction, and/or weight based adjustment of the mA/kV was utilized to reduce the radiation dose to as low as reasonably achievable. COMPARISON: None HISTORY: ORDERING SYSTEM PROVIDED HISTORY: Diffuse mets with unknown primary TECHNOLOGIST PROVIDED HISTORY: Reason for exam:->Diffuse mets with unknown primary Additional Contrast?->Radiologist Recommendation Reason for Exam: METS FINDINGS: Mediastinum: Pulmonary arteries appear unremarkable.  No evidence of enlargement of the main pulmonary artery.  Ascending aorta is nonaneurysmal. No evidence of dissection.  Scattered mediastinal nodes but no lymphadenopathy. Lungs/pleura: Bilateral lower lobe atelectatic changes.  Increased interstitial markings and mild interstitial nodularity but no consolidative infiltrates.  Lymphangitic spread is possible.  More discrete mass can be seen in the right lower lobe measures 1.6 x 1.6 cm adjacent to the right lower lobe pulmonary artery/vein.  No active pleural disease. Upper Abdomen: Scattered liver lesions likely represent metastatic disease. Right adrenal mass measures 2 cm with mean Hounsfield units of 97 likely represents metastatic disease. Soft Tissues/Bones: A left scapular mass measures 5.9 x 8.2 cm with destructive changes of the scapula.  Compression injuries at T2 and T7.  At the level of T6 and T7 a destructive mass can be seen posteriorly destroying vertebral body and posterior elements as well as rib destructive changes. There is invasion into the spinal canal and epidural space.  Moderate kyphotic deformity.  Incidental

## 2024-01-09 NOTE — PROGRESS NOTES
01/09/24 1151   Encounter Summary   Encounter Overview/Reason  Initial Encounter   Service Provided For: Patient not available   Last Encounter  01/09/24  (Patient was not available this  will continue to follow up)   Begin Time 1151   End Time  1201   Total Time Calculated 10 min   Assessment/Intervention/Outcome   Intervention Sustaining Presence/Ministry of presence   Plan and Referrals   Plan/Referrals Continue to visit, (comment)

## 2024-01-09 NOTE — PROGRESS NOTES
Occupational Therapy  [facility] ACUTE CARE OCCUPATIONAL THERAPY EVALUATION  Sebastian Perkins, 1960, 2128/2128-A, 1/9/2024    Discharge Recommendation: Skilled Nursing Facility    History  [place]:  The primary encounter diagnosis was Septicemia (HCC). Diagnoses of Acute cystitis with hematuria, Metastatic malignant neoplasm, unspecified site (HCC), Generalized weakness, Tumor associated pain, and Chest pain, unspecified type were also pertinent to this visit.  Patient  has a past medical history of Cerebral artery occlusion with cerebral infarction (HCC).  Patient  has no past surgical history on file.    Subjective:  Patient states:  \"I need to lay back down\"   Pain:  4/10 pain in supine, 9/10 pain when coughing  Communication: RN, CM, co-eval with PT Radha  Restrictions: spinal precautions, hemovac, general precautions, fall risk, do not lay on back per neurosx     Home Setup/Prior level of function  Social/Functional History  Type of Home: Facility  Home Equipment: Walker, rolling, Wheelchair-manual  Has the patient had two or more falls in the past year or any fall with injury in the past year?: No  ADL Assistance: Needs assistance  Homemaking Responsibilities: No  Ambulation Assistance: Needs assistance (uses w/c for most mobility at mod I, reports able to walk short distances w/ assistance)  Transfer Assistance: Independent (mod I SPT)    Examination of body systems (includes body structures/functions, activity/participation limitations):  Observation:  Supine in bed (positioned with pillows so partial side-lying) upon arrival, agreeable to therapy   Vision:  WFL  Hearing:  WFL  Cardiopulmonary:  3L O2, tele, vitals remained stable throughout session       Body Systems and functions:  ROM R/L:  L UE minimal movement d/t prior CVA (~50% PROM), R UE WFL    Strength R/L:  RUE 4-/5,  LUE 2-/5, R  4/5  Sensation: B  UE WFL, B LE have numbness and tingling  Tone: Normal  Coordination:  WFL  Perception: WNL    Pt body function inferred from observation of gross motor function, and assessment of mobility, balance, posture, ADL performance, and activity tolerance.    Activities of Daily Living (ADLs):  Feeding: min A  Grooming: min A  UB bathing: moderate assist    LB bathing: maximal assist  UB dressing: moderate assist    LB dressing: maximal assist  Toileting: maximal assist    Pt ADL function inferred from observation of gross motor function, and assessment of mobility, balance, posture, safety awareness, cognition, and activity tolerance.     AM-PAC 6 click short form for inpatient daily activity:   How much help from another person does the patient currently need... Unable  Dep A Lot  Max A A Lot   Mod A A Little  Min A A Little   CGA  SBA None   Mod I  Indep  Sup   1.  Putting on and taking off regular lower body clothing? [] 1    [x] 2   [] 2   [] 3   [] 3   [] 4      2. Bathing (including washing, rinsing, drying)? [] 1   [x] 2   [] 2 [] 3 [] 3 [] 4   3. Toileting, which includes using toilet, bedpan, or urinal? [] 1    [x] 2   [] 2   [] 3   [] 3   [] 4     4. Putting on and taking off regular upper body clothing? [] 1   [] 2   [x] 2   [] 3   [] 3    [] 4      5. Taking care of personal grooming such as brushing teeth? [] 1   [] 2    [] 2 [x] 3    [] 3   [] 4      6. Eating meals?   [] 1   [] 2   [] 2   [x] 3   [] 3   [] 4      Raw Score:  14    [24=0% impaired(CH), 23=1-19%(CI), 20-22=20-39%(CJ), 15-19=40-59%(CK), 10-14=60-79%(CL), 7-9=80-99%(CM), 6=100%(CN)]     Cognitive and Psychosocial Functioning:  Overall cognitive status:  Pt is A&Ox4, attention appears intact, and is able to follow multi-step commands w/o difficulty.    Affect: Pleasant     Mobility:  Supine to sit: moderate assist  x2  Sit to supine: moderate assist  x2  Roll R/L: mod A x2  Sit to stand: not attempted  Stand to sit: not attempted  Functional Mobility:  not attempted  Toilet Transfers: not attempted*      Balance:

## 2024-01-09 NOTE — ANESTHESIA PROCEDURE NOTES
Arterial Line:    An arterial line was placed using surface landmarks, in the OR for the following indication(s): continuous blood pressure monitoring and blood sampling needed.    A 20 gauge (size), 4.45 cm (length), Arrow (type) catheter was placed, Seldinger technique used, into the right radial artery, secured by tape.  Anesthesia type: General    Events:  patient tolerated procedure well with no complications and EBL < 5mL.1/8/2024 6:10 PM1/8/2024 6:15 PM  Resident/CRNA: Marcel Rubio APRN - CRNA  Performed: Resident/CRNA   Preanesthetic Checklist  Completed: patient identified, IV checked, site marked, risks and benefits discussed, surgical/procedural consents, equipment checked, pre-op evaluation, timeout performed, anesthesia consent given, oxygen available, monitors applied/VS acknowledged, fire risk safety assessment completed and verbalized and blood product R/B/A discussed and consented

## 2024-01-09 NOTE — CONSULTS
[facility] ACUTE CARE PHYSICAL THERAPY RE-EVALUATION  Sebastian Perkins, 1960, 2128/2128-A, 1/9/2024    History  [place]:  The primary encounter diagnosis was Septicemia (HCC). Diagnoses of Acute cystitis with hematuria, Metastatic malignant neoplasm, unspecified site (HCC), Generalized weakness, Tumor associated pain, and Chest pain, unspecified type were also pertinent to this visit.  Patient  has a past medical history of Cerebral artery occlusion with cerebral infarction (HCC).  Patient  has no past surgical history on file.    Discharge Recommendation: SNF    Subjective:    Patient states:  \"Is this going to hurt?\"      Pain:  4/10 pain in supine, denies pain in sitting, states 9/10 pain when coughing. All in mid/low back at sx site and R shoulder.      Communication with other providers:  Handoff to RN, OT    Restrictions: spinal precautions, hemovac, general precautions, fall risk, do not lay on back per neurosx    Home Setup/Prior level of function  Social/Functional History  Type of Home: Facility  Home Equipment: Walker, rolling, Wheelchair-manual  Has the patient had two or more falls in the past year or any fall with injury in the past year?: No  ADL Assistance: Needs assistance  Homemaking Responsibilities: No  Ambulation Assistance: Needs assistance (uses w/c for most mobility at mod I, reports able to walk short distances w/ assistance)  Transfer Assistance: Independent (mod I SPT)    Examination of body systems (includes body structures/functions, activity/participation limitations):  Observation:  pt supine in bed upon arrival and agreeable to therapy  Vision:  WFL  Hearing:  WFL  Cardiopulmonary:  3L O2  Cognition: A&Ox4, see OT/SLP note for further evaluation.    Musculoskeletal  ROM R/L:  WFL.    Strength R/L:  0/5, significant impairment in function and endurance.    Neuro:  Hx of CVA with L UE and LE residual weakness, numbness and decreased strength in bilateral LEs

## 2024-01-09 NOTE — PROGRESS NOTES
Neurosurgery Post-op Note    PROCEDURE: left T6-8 laminectomy, T5-9 percutaneous instrumentation and fusion    1/8/2024 10:06 PM    Patient seen in PACU  Drowsy, able to follow simple commands occasionally  Able to lift right arm against gravity, no movement in left arm or leg similar to before surgery, no movement in right leg to commands while drowsy  Drain with minimal output  Incisions intact (9), dermabond in place with 2 silverlon    /78   Pulse (!) 139   Temp 97.5 °F (36.4 °C) (Temporal)   Resp 18   Ht 1.626 m (5' 4\")   Wt 68.3 kg (150 lb 9.6 oz)   SpO2 94%   BMI 25.85 kg/m²       Plan:  Admit to ICU to keep MAP >85  Neuro checks every 2 hours overnight  Monitor drain output  Activity as tolerated  Advance diet as tolerated  Post-operative xrays ordered  No brace needed, can work with therapy  Keep patient positioned on his side, do not lay him on his back  Please call our service if there are any changes in neuro exam    Electronically signed by Laura Barber PA-C on 1/8/2024 at 10:06 PM

## 2024-01-09 NOTE — DISCHARGE INSTR - COC
Continuity of Care Form    Patient Name: Sebastian Perkins   :  1960  MRN:  [id number]    Admit date:  2024  Discharge date:  2024    Code Status Order: Full Code   Advance Directives:   Advance Care Flowsheet Documentation       Date/Time Healthcare Directive Type of Healthcare Directive Copy in Chart Healthcare Agent Appointed Healthcare Agent's Name Healthcare Agent's Phone Number    24 0906 No, patient does not have an advance directive for healthcare treatment -- -- -- -- --    24 1659 No, patient does not have an advance directive for healthcare treatment -- -- -- -- --            Admitting Physician:  Mat Willoughby MD  PCP: No primary care provider on file.    Discharging Nurse: Bruna Rivera RN  Discharging Hospital Unit/Room#: 3110/3110-A  Discharging Unit Phone Number: [phone]    Emergency Contact:   Extended Emergency Contact Information  Primary Emergency Contact: Jaleesa PHILLIPS  Home Phone: [phone]  Relation: Brother/Sister  Secondary Emergency Contact: Luis AGe  Home Phone: [phone]  Mobile Phone: [phone]  Relation: Other    Past Surgical History:  Past Surgical History:   Procedure Laterality Date    LUMBAR SPINE SURGERY N/A 2024    LEFT T6-8 LAMINECTOMY FOR RESECTION OF EXTRADURAL TUMOR, T5-9 PERCUTANEOUS INSTRUMENTATION performed by Nallely Ragland MD at [facility] OR    UPPER GASTROINTESTINAL ENDOSCOPY N/A 2024    EGD BIOPSY performed by Hazel Zhou MD at [facility] ENDOSCOPY       Immunization History:   Immunization History   Administered Date(s) Administered    COVID-19, PFIZER Bivalent, DO NOT Dilute, (age 12y+), IM, 30 mcg/0.3 mL 2022    COVID-19, PFIZER GRAY top, DO NOT Dilute, (age 12 y+), IM, 30 mcg/0.3 mL 2022    COVID-19, PFIZER PURPLE top, DILUTE for use, (age 12 y+), 30mcg/0.3mL 2020, 2021, 2021       Active Problems:  Patient Active Problem List   Diagnosis Code    Weakness R53.1    Metastatic malignant neoplasm

## 2024-01-09 NOTE — PROGRESS NOTES
Inpatient Progress Note 1/9/2024        Sebastian Perkins  1960  [id number]      Assessment/Plan:  Sebastian Perkins is a 63-year-old male who has a past medical history of CVA with Left sided weakness/deficit, HTN and metastic disease of unknown origin. He was presented with low back pain on 1/6/2024, imaging studies revealed T2 and T7 compression fractures He underwent a left T6-8 laminectomy, T5-T9 percutaneous instrumentation and fusion 1/8/2024 and transferred to ICU postoperatively.     Problem list  S/p left T6-8 laminectomy, T5-T9 percutaneous instrumentation and fusion 1/8/2024  Pathologic compression T7 vertebral fracture  Spinal cord compression due to malignant neoplasm metastatic to spine  Liver lesions  Right adrenal nodule  Right renal cystic lesion  Prostatomegaly  UTI  Hx right MCA stroke    Neuro: Intact, at baseline.  GCS 14. S/p left T6-8 laminectomy, T5-T9 percutaneous instrumentation and fusion 1/8/2024. Neuro assessments per protocol. Analgesics as needed for pain management. Scheduled acetaminophen. Continue Neurontin.    Cardio:  Maintain map goal >85 x72 hours postoperatively. Titrate Mahendra-synephrine to maintain map goal.   Resp: Supplemental O2 as needed to maintain SpO2 >93%  GI: No acute concerns. Bowel regimen   : CCP daily. Optimize lytes. No acute concerns. Victor for accurate I&O. IVF hydration with LR.   Heme: acute on chronic anemia. Hgb 8.1>7.6. Trend CBC. Transfuse for hgb <7.   ID: UTI. WBC 13.2, trend CBC daily. Continue Ceftriaxone x5 days.   Endo: No acute concerns.   MSK: Left Upper and Lower extremity weakness 2/2 previous CVA deficit. PT/OT when ok per neurosurgery.     Tubes/Lines/Drains:    PIV, SANTOS drain, Victor     Code Status:   Full code    Subjective:    Patient seen and examined at bedside in ICU. Awake and alert, oriented x3. VSS. Remains on low dose  mahendra-synephrine to maintain map goal >85 per neurosurgery. Denies needs.       Review of Systems  18 - 58 mL    LA Diameter 2.9 cm    AV Mean Gradient 5 mmHg    AV VTI 19.5 cm    AV Mean Velocity 1.0 m/s    AV Peak Velocity 1.4 m/s    AV Peak Gradient 8 mmHg    AV Area by VTI 2.9 cm2    AV Area by Peak Velocity 3.2 cm2    Aortic Root 3.0 cm    IVC Proxmal 1.4 cm    MV E Velocity 0.87 m/s    RV Mid Dimension 2.4 cm    Body Surface Area 1.76 m2    Fractional Shortening 2D 45 28 - 44 %    LV ESV Index A4C 18 mL/m2    LV EDV Index A4C 52 mL/m2    LVIDd Index 2.31 cm/m2    LVIDs Index 1.27 cm/m2    LV RWT Ratio 0.40     LV Mass 2D 85.8 (A) 88 - 224 g    LV Mass 2D Index 49.6 49 - 115 g/m2    E/E' Lateral 6.21     LVOT Stroke Volume Index 32.8 mL/m2    LA Volume Index MOD A4C 16 16 - 34 ml/m2    LA Size Index 1.68 cm/m2    LA/AO Root Ratio 0.97     Ao Root Index 1.73 cm/m2    AV Velocity Ratio 0.93     LVOT:AV VTI Index 0.84     JOSEF/BSA VTI 1.7 cm2/m2    JOSEF/BSA Peak Velocity 1.8 cm2/m2     Critical care time, excluding time doing procedures, was 36 minutes       Electronically signed by VIJAYA Márquez NP on 1/9/2024 at 3:08 PM

## 2024-01-09 NOTE — OP NOTE
Operative Note      Patient: Sebastian Perkins  YOB: 1960  MRN: [id number]    Date of Procedure: 1/8/2024    Preoperative Diagnosis: T7 pathologic fracture  T7 metastatic tumor with spinal cord compression  Spinal metastasis, unknown primary  Baseline left hemiparesis  Acute paraparesis    Post-Op Diagnosis: Same    Procedure:  Wide T6-T8 laminectomies  Left T7 transpedicular corpectomy  Open reduction of T7 compression fracture  Resection of extradural spinal tumor  Posterior T5-T9 instrumentation and fusion  Morselized allograft ([employer] [place])  Intraoperative fluoroscopy  Microdissection  Neurophysiologic monitoring    Surgeon(s):  Nallely Ragland MD    Assistant:   Physician Assistant: Laura Barber PA-C    Anesthesia: General    Estimated Blood Loss (mL): less than 100     Complications: None    Specimens:   ID Type Source Tests Collected by Time Destination   A : T7 paraspinal tumor Tissue Tissue SURGICAL PATHOLOGY Nallely Ragland MD 1/8/2024 1907    B : T7 Epidural Tumor Tissue Tissue SURGICAL PATHOLOGY Nallely Ragland MD 1/8/2024 2037        Implants:  Implant Name Type Inv. Item Serial No.  Lot No. LRB No. Used Action   EXTENDER 5.5/6.0 SOVEREIGN TAB SCREW - [id number] Spine EXTENDER 5.5/6.0 SOVEREIGN TAB SCREW  [employer]-PMM  N/A 16 Implanted   SET SCR SPNL 55 6MM CRISTIN FEN MULTIAXIAL CDH SOLERA VOYAGER - [id number]  SET SCR SPNL 55 6MM CRISTIN FEN MULTIAXIAL CDH SOLERA VOYAGER  [employer] SOFAMOR DANEK-WD  N/A 8 Implanted   CAP SCREW 5.5/6.0 SOVEREIGN - [id number] Spine CAP SCREW 5.5/6.0 SOVEREIGN  [employer]-PMM  N/A 8 Implanted   SCREW SPNL L40MM [id number] HI STRENGTH LO PROF MULTIAXIAL FOR - [id number]  SCREW SPNL L40MM [id number] HI STRENGTH LO PROF MULTIAXIAL FOR  [employer] SOFAMOR DANEK-WD  N/A 4 Implanted   SCREW SPNL L45MM [id number] HI STRENGTH LO PROF MULTIAXIAL FOR - [id number]  SCREW SPNL L45MM [id number] HI STRENGTH LO PROF MULTIAXIAL FOR  [employer]  SOFAMOR DANEK-WD  N/A 2 Implanted   SCREW SPNL L45MM [id number] HI STRENGTH LO PROF MULTIAXIAL FOR - [id number]  SCREW SPNL L45MM [id number] HI STRENGTH LO PROF MULTIAXIAL FOR  [employer] SOFAMOR DANEK-WD  N/A 2 Implanted   MURTAZA SPNL L140MM OD55MM CO CHROME MOLYBDENUM ANT POST - [id number]  MURTAZA SPNL L140MM OD55MM CO CHROME MOLYBDENUM ANT POST  [employer] SOFAMOR DANEK-WD  N/A 2 Implanted         Drains:   Closed/Suction Drain Right Back Accordion (Active)       Urinary Catheter 01/06/24 Victor (Active)   $ Urethral catheter insertion $ Not inserted for procedure 01/06/24 1623   Catheter Indications Urinary retention (acute or chronic), continuous bladder irrigation or bladder outlet obstruction 01/08/24 1011   Site Assessment Bleeding 01/08/24 1011   Urine Color Bloody 01/08/24 1011   Urine Appearance Clots 01/08/24 1011   Urine Odor Other (Comment) 01/08/24 1011   Collection Container Standard 01/08/24 1011   Securement Method Securing device (Describe) 01/08/24 1011   Catheter Care  Perineal wipes 01/07/24 2051   Catheter Best Practices  Drainage tube clipped to bed;Catheter secured to thigh;Tamper seal intact;Bag below bladder;Drainage bag less than half full;Lack of dependent loop in tubing;Bag not on floor 01/08/24 1011   Status Draining 01/08/24 1011   Manual Irrigation Volume Input (mL) 50 mL 01/07/24 2051   Output (mL) 450 mL 01/08/24 0626       Findings: Metastatic tumor        Detailed Description of Procedure:   The patient was positioned prone on a Ehsan table with all pressure points padded.  The back was prepped and draped in sterile fashion.  Prophylactic antibiotic was administered.  Timeout was performed.  Instructions were given to keep the MAP greater than 85 mmHg during the decompression.  Fluoroscopy was used identify the T6-T8 levels.  Midline incision was marked.  1% lidocaine with epinephrine was injected along the planned incision.  Skin incision was made with scalpel.  Water was used to expose the

## 2024-01-09 NOTE — PROGRESS NOTES
Patient Name:  Sebastian Perkins  Patient :  1960  Patient MRN:  [id number]     Primary Oncologist: Jose Rollins MD  Referring Provider: No primary care provider on file.    Sebastian Perkins is a 63 y.o. male with medical history significant for h/o stroke with left sided weakness, presented to [facility] on 24 after he was unable to sit up and fell back in bed.      Patient is limited with his mobility at baseline due to prior stroke which is immobilized his left upper and lower extremities.  Uses a wheelchair at baseline.  Reports back/shoulder pain.      CT abdomen/pelvis on 24 showed metastatic disease of uncertain origin with lytic lesions in the right pubic bone and left iliac wing, several suspicious hepatic lesions, and right hilar lymphadenopathy versus perihilar pulmonary nodules. 2.2 cm indeterminate right adrenal nodule.   Moderate prostatomegaly with moderate urinary bladder distension. Layering calculi within the urinary bladder lumen.1.5 cm indeterminate right renal cystic lesion. Suspected post treatment changes in the superior pole of the left kidney.     I was called to evaluate him. He has lost weight lately. He is a smoker and he is a residence of [facility].     CT Chest 2023 showed compression injuries at T2, T7, destructive mass at T6/T7 with posterior destruction of vertebral body, invasion into spinal canal and epidural space, interstitial nodularity, RLL mass 1.6 x 1.6 cm, L scapular mass measuring 5.9 x 8.2 cm with destructive changes.     Interval 24  Pt was seen and examined. Had surgery yesterday. He still has weakness in R leg and chronic deficits are present on L. Will await pathology results.     Labs today showed Cr 0.4, Ca 8.1, albumin 2.9,      Vital Signs: /71   Pulse (!) 120   Temp 100 °F (37.8 °C) (Rectal)   Resp 13   Ht 1.626 m (5' 4\")   Wt 68.3 kg (150 lb 9.6 oz)   SpO2 96%   BMI 25.85 kg/m²      Physical Exam:  CONSTITUTIONAL: awake, alert,  01/09/2024    LABALBU 2.9 (L) 01/09/2024    BILITOT 0.3 01/09/2024    ALKPHOS 85 01/09/2024    AST 21 01/09/2024    ALT 10 01/09/2024    LABGLOM >60 01/09/2024    GFRAA >60 07/24/2019    MG 2.1 01/06/2024     No results found for: \"MMA\", \"LDH\", \"HOMOCYSTEINE\"  No results found for: \"LD\"  No results found for: \"TSHHS\", \"T4FREE\", \"FT3\"  Immunology:  Lab Results   Component Value Date    PROT 4.9 (L) 01/09/2024     No results found for: \"KAPPAUVOL\", \"LAMBDAUVOL\", \"KLFLCR\"  No results found for: \"B2M\"  Coagulation Panel:  Lab Results   Component Value Date    PROTIME 15.3 (H) 01/08/2024    INR 1.2 01/08/2024    APTT 29.7 01/08/2024     Anemia Panel:  No results found for: \"ATWNGULW09\", \"FOLATE\"  Tumor Markers:  Lab Results   Component Value Date    CEA 31.8 (H) 01/07/2024        FL LESS THAN 1 HOUR   Final Result      CT THORACIC SPINE WO CONTRAST   Final Result   Unchanged pathologic compression fracture at T7 with associated soft tissue   density extending to the central canal and invading the posterior elements   and adjacent posterior rib.      Unchanged left scapular mass.      Known hepatic lesions not well identified.  Unchanged right adrenal lesion.      Unchanged age-indeterminate mild compression deformity at T2.         MRI THORACIC SPINE W WO CONTRAST   Final Result   1. Pathologic compression deformity of the T7 vertebral body due to bony   metastasis with an expansile appearance of the left posterior elements of T7.   2. Extra osseous extension into the left dorsal and lateral epidural space at   T7 contribute to severe spinal canal stenosis as well as T2 hyperintensity   within the thoracic cord centered at T7.   3. There is extension into the left posterior paraspinal soft tissues at T7.   4. No additional lesion is seen involving the thoracic spine.   5. Partial visualization of expansile lesion involving the left scapula.   These results were sent to the CORE Team on 1/8/2024 at 9:49 am to be

## 2024-01-09 NOTE — PROGRESS NOTES
SLP ALL NOTES  Facility/Department: [facility] ICU   CLINICAL BEDSIDE SWALLOW EVALUATION    NAME: Sebastian Perkins  : 1960  MRN: [id number]    ADMISSION DATE: 2024  ADMITTING DIAGNOSIS: has Weakness; Metastatic malignant neoplasm (HCC); T7 vertebral fracture (HCC); and Spinal cord compression due to malignant neoplasm metastatic to spine (HCC) on their problem list.    Impression  Dysphagia Diagnosis: Swallow function appears WFL;Mild oral stage dysphagia  Dysphagia Outcome Severity Scale: Level 5: Mild dysphagia- Distant supervision. May need one diet consistency restricted     Sebastian Perkins was seen for a clinical bedside swallow evaluation following admission for c/o back and shoulder pain.  Pt found to have suspected metastatic CA with unknown etiology and T2/T7 compression fractures, likely pathologic.  Pt s/p laminectomy and fusionT6-T8.  Pt seen in the ICU, in a semi-side lying position.  He was alert and cooperative and c/o pain in his chest /10.  Nsg notified.  H/o right MCA CVA with left sided weakness in the upper and lower extremities, weak vocal quality resulting in reduced intelligibility.  Pt states that he eats soft foods and thin liquids at baseline with no dentition.  He reports \"if I drink too fast I have trouble.\"  Pt's oral motor exam was without asymmetry.  PO trials of thins by straw, regular and pureed solids completed with reduced mastication for regular solids which pt orally expelled.  Normal clearance for all other trials/textures.  Pharyngeal swallow appeared WFL with likely functional timing and 0 s/s aspiration observed for all trials.  Hyolaryngeal excursion/elevation appeared WFL.    Recommend:  Easy to Chew/Thins.  ST will monitor x 1 for safe diet tolerance.  Aspiration precautions.  Discussed recommendations with JEFF Hernandez.    ONSET DATE: 2024     Recent Chest Xray/CT of Chest: see chart     Date of Eval: 2024  Evaluating Therapist: Bree Strauss, SLP    Current

## 2024-01-09 NOTE — H&P
History & Physical      DATE: 24  NAME: Sebastian Perkins  : 1960  MRN: [id number]    Chief Complaint:   Chief Complaint   Patient presents with    Back Pain     Low back pain from nursing home. Reports chronic pain but worse today. Reports a hard time stand d/t pain     Shoulder Pain     Left shoulder, chronic pain but worse today. NKI        History of Present Illness:  Sebastian Perkins is a 63 y.o. male  patient with past medical history as below, presented to the emergency room for evaluation of Low back pain from nursing home.  admitted on  for metastic disease of  unknown primary, imaging studies revealed T2 and T7 compression fractures, underwent T7 laminectomy with neurosurgery and transferred to the ICU postop.    Arrived extubated, reversed postop, awake following simple commands, spontaneous RUE movement.   hemodynamically stable, MAP goal above 85, was on  phenylephrine drip in the OR  Critical care team consulted for  Comanagement and ICU  care.     ROS:  Negative except as in HPI    Past Medical, Surgical, Social, Family History:  Past Medical History:   Diagnosis Date    Cerebral artery occlusion with cerebral infarction (HCC)      History reviewed. No pertinent surgical history.  Social History     Socioeconomic History    Marital status:      Spouse name: Not on file    Number of children: Not on file    Years of education: Not on file    Highest education level: Not on file   Occupational History    Not on file   Tobacco Use    Smoking status: Every Day     Current packs/day: 0.25     Types: Cigarettes    Smokeless tobacco: Never   Substance and Sexual Activity    Alcohol use: Not Currently    Drug use: Never    Sexual activity: Not on file   Other Topics Concern    Not on file   Social History Narrative    Not on file     Social Determinants of Health     Financial Resource Strain: Not on file   Food Insecurity: No Food Insecurity (2024)    Hunger Vital Sign     Worried  About Running Out of Food in the Last Year: Never true     Ran Out of Food in the Last Year: Never true   Transportation Needs: Unknown (1/9/2024)    PRAPARE - Transportation     Lack of Transportation (Medical): No     Lack of Transportation (Non-Medical): Not on file   Physical Activity: Not on file   Stress: Not on file   Social Connections: Not on file   Intimate Partner Violence: Not on file   Housing Stability: Not on file     History reviewed. No pertinent family history.    Home Medications:  Prior to Admission medications    Not on File       Physical Exam:    Vital Signs:  BP (!) 94/59   Pulse (!) 124   Temp 99 °F (37.2 °C)   Resp 16   Ht 1.626 m (5' 4\")   Wt 68.3 kg (150 lb 9.6 oz)   SpO2 97%   BMI 25.85 kg/m²       General: Not in acute distress  Eyes: EOMI, Reactive pupils  ENT: neck supple  Cardiovascular: Regular rate.  Respiratory: Clear to auscultation  Gastrointestinal: Soft, non tender  Musculoskeletal: No edema  Skin: warm, dry  Neuro: Alert,  spontaneous AG right upper extremity movement, baseline left hemiplegia and right lower extremity weakness,  no movement or withdrawal to painful stimuli      Labs and Imaging Studies:    FL LESS THAN 1 HOUR    Result Date: 1/8/2024  Radiology exam is complete. No Radiologist dictation. Please follow up with ordering provider.     CT THORACIC SPINE WO CONTRAST    Result Date: 1/8/2024  EXAMINATION: CT OF THE THORACIC SPINE WITHOUT CONTRAST  1/8/2024 10:46 am: TECHNIQUE: CT of the thoracic spine was performed without the administration of intravenous contrast. Multiplanar reformatted images are provided for review. Automated exposure control, iterative reconstruction, and/or weight based adjustment of the mA/kV was utilized to reduce the radiation dose to as low as reasonably achievable. COMPARISON: 01/07/2014, same day thoracic spine MRI HISTORY: ORDERING SYSTEM PROVIDED HISTORY: t2 and t7 fracture TECHNOLOGIST PROVIDED HISTORY: Reason for exam:->t2

## 2024-01-09 NOTE — ANESTHESIA POSTPROCEDURE EVALUATION
Department of Anesthesiology  Postprocedure Note    Patient: Sebastian Perkins  MRN: [id number]  YOB: 1960  Date of evaluation: 1/8/2024    Procedure Summary     Date: 01/08/24 Room / Location: [address]    Anesthesia Start: 1750 Anesthesia Stop: 2226    Procedure: LEFT T6-8 LAMINECTOMY FOR RESECTION OF EXTRADURAL TUMOR, T5-9 PERCUTANEOUS INSTRUMENTATION (Spine Thoracic) Diagnosis:       Tumor associated pain      (Tumor associated pain [G89.3])    Surgeons: Nallely Ragland MD Responsible Provider: Miguelangel Resendiz MD    Anesthesia Type: general ASA Status: 4 - Emergent          Anesthesia Type: No value filed.    Ayden Phase I:      Ayden Phase II:      Anesthesia Post Evaluation    Patient location during evaluation: ICU  Patient participation: complete - patient participated  Level of consciousness: sleepy but conscious  Pain score: 0  Airway patency: patent  Nausea & Vomiting: no nausea and no vomiting  Cardiovascular status: blood pressure returned to baseline and hemodynamically stable  Respiratory status: acceptable and face mask  Hydration status: euvolemic  Pain management: adequate        No notable events documented.

## 2024-01-09 NOTE — PLAN OF CARE

## 2024-01-09 NOTE — PROGRESS NOTES
[address] [address]   Progress Note  [facility] 1 2 3 4 5      Date: 2024   Patient: Sebastian Perkins   : 1960   DOA: 2024   MRN: [id number]   ROOM#: 2128/2128-A     Admit Date: 2024     Collaborating Urologist on Call at time of admission: Dr. Palacios  Chief Complaint:   Chief Complaint   Patient presents with    Back Pain     Low back pain from nursing home. Reports chronic pain but worse today. Reports a hard time stand d/t pain     Shoulder Pain     Left shoulder, chronic pain but worse today. NKI       Subjective:     Patient seen and examined.  Resting in bed.  Indwelling Victor catheter draining well.  Color is yellow    REVIEW OF SYSTEMS:     14 systems reviewed and negative except in HPI    Objective:    Vitals:    /78   Pulse (!) 116   Temp 99.5 °F (37.5 °C) (Rectal)   Resp 14   Ht 1.626 m (5' 4\")   Wt 68.3 kg (150 lb 9.6 oz)   SpO2 99%   BMI 25.85 kg/m²    Temp  Av.3 °F (36.8 °C)  Min: 97.2 °F (36.2 °C)  Max: 100 °F (37.8 °C)     Intake/Output Summary (Last 24 hours) at 2024 1218  Last data filed at 2024 1200  Gross per 24 hour   Intake 2386.09 ml   Output 1085 ml   Net 1301.09 ml         Physical Exam:   Gen: Pleasant male, in NAD  Neuro: Non-focal  Resp: Unlabored breathing  CV: Regular rate and rhythm  Abd: soft, non-distended, non-tender to palpation, no rebound  Back:   No CVAT  : Indwelling Victor catheter  Ext: No edema of bilateral LEs    Labs:   WBC:    Lab Results   Component Value Date/Time    WBC 13.2 2024 08:27 AM      Hemoglobin/Hematocrit:    Lab Results   Component Value Date/Time    HGB 8.1 2024 08:27 AM    HCT 25.9 2024 08:27 AM      BMP:   Lab Results   Component Value Date/Time     2024 06:00 AM    K 4.0 2024 06:00 AM     2024 06:00 AM    CO2 25 2024 06:00 AM    BUN 12 2024 06:00 AM    LABALBU 2.9 2024 06:00 AM    CREATININE 0.4 2024 06:00 AM    CALCIUM  iliac wing, several suspicious hepatic lesions, and right hilar lymphadenopathy versus perihilar pulmonary nodules. 2.2 cm indeterminate right adrenal nodule.  High CEA with normal PSA.  Oncology following.    Continue indwelling Victor catheter.  Bladder irrigation as needed.  Urine is clear today.    Patient seen and examined, chart reviewed.  Thank you for this consultation. Please do not hesitate to call with any questions.    Electronically signed by; Misti Munoz PA-C 1/9/2024

## 2024-01-09 NOTE — PROGRESS NOTES
Attempted to call Radiation Oncology for consult Dr. Ca or Dr. Villegas. Both say no one is on call at this time. Will continue to attempt consult when on call, MD aware.

## 2024-01-10 LAB
ANION GAP SERPL CALCULATED.3IONS-SCNC: 9 MMOL/L (ref 7–16)
BUN SERPL-MCNC: 11 MG/DL (ref 6–23)
CALCIUM SERPL-MCNC: 8.1 MG/DL (ref 8.3–10.6)
CHLORIDE BLD-SCNC: 105 MMOL/L (ref 99–110)
CO2: 27 MMOL/L (ref 21–32)
CREAT SERPL-MCNC: 0.4 MG/DL (ref 0.9–1.3)
GFR SERPL CREATININE-BSD FRML MDRD: >60 ML/MIN/1.73M2
GLUCOSE SERPL-MCNC: 117 MG/DL (ref 70–99)
HCT VFR BLD CALC: 29.8 % (ref 42–52)
HEMOGLOBIN: 8.4 GM/DL (ref 13.5–18)
MCH RBC QN AUTO: 28.3 PG (ref 27–31)
MCHC RBC AUTO-ENTMCNC: 28.2 % (ref 32–36)
MCV RBC AUTO: 100.3 FL (ref 78–100)
PDW BLD-RTO: 15.9 % (ref 11.7–14.9)
PLATELET # BLD: 367 K/CU MM (ref 140–440)
PMV BLD AUTO: 10 FL (ref 7.5–11.1)
POTASSIUM SERPL-SCNC: 3.4 MMOL/L (ref 3.5–5.1)
RBC # BLD: 2.97 M/CU MM (ref 4.6–6.2)
SODIUM BLD-SCNC: 141 MMOL/L (ref 135–145)
WBC # BLD: 14.9 K/CU MM (ref 4–10.5)

## 2024-01-10 PROCEDURE — 2580000003 HC RX 258: Performed by: NEUROLOGICAL SURGERY

## 2024-01-10 PROCEDURE — 94761 N-INVAS EAR/PLS OXIMETRY MLT: CPT

## 2024-01-10 PROCEDURE — 2580000003 HC RX 258: Performed by: STUDENT IN AN ORGANIZED HEALTH CARE EDUCATION/TRAINING PROGRAM

## 2024-01-10 PROCEDURE — 36415 COLL VENOUS BLD VENIPUNCTURE: CPT

## 2024-01-10 PROCEDURE — 6370000000 HC RX 637 (ALT 250 FOR IP): Performed by: INTERNAL MEDICINE

## 2024-01-10 PROCEDURE — 99232 SBSQ HOSP IP/OBS MODERATE 35: CPT | Performed by: INTERNAL MEDICINE

## 2024-01-10 PROCEDURE — 6370000000 HC RX 637 (ALT 250 FOR IP): Performed by: PHYSICIAN ASSISTANT

## 2024-01-10 PROCEDURE — 6370000000 HC RX 637 (ALT 250 FOR IP): Performed by: NEUROLOGICAL SURGERY

## 2024-01-10 PROCEDURE — 80048 BASIC METABOLIC PNL TOTAL CA: CPT

## 2024-01-10 PROCEDURE — 2000000000 HC ICU R&B

## 2024-01-10 PROCEDURE — 85027 COMPLETE CBC AUTOMATED: CPT

## 2024-01-10 PROCEDURE — 84585 ASSAY OF URINE VMA: CPT

## 2024-01-10 PROCEDURE — 6360000002 HC RX W HCPCS: Performed by: PHYSICIAN ASSISTANT

## 2024-01-10 PROCEDURE — 2580000003 HC RX 258: Performed by: ANESTHESIOLOGY

## 2024-01-10 PROCEDURE — 99024 POSTOP FOLLOW-UP VISIT: CPT | Performed by: PHYSICIAN ASSISTANT

## 2024-01-10 PROCEDURE — 6360000002 HC RX W HCPCS: Performed by: NEUROLOGICAL SURGERY

## 2024-01-10 PROCEDURE — 6360000002 HC RX W HCPCS: Performed by: STUDENT IN AN ORGANIZED HEALTH CARE EDUCATION/TRAINING PROGRAM

## 2024-01-10 PROCEDURE — 6370000000 HC RX 637 (ALT 250 FOR IP): Performed by: STUDENT IN AN ORGANIZED HEALTH CARE EDUCATION/TRAINING PROGRAM

## 2024-01-10 RX ORDER — POTASSIUM CHLORIDE 20 MEQ/1
40 TABLET, EXTENDED RELEASE ORAL ONCE
Status: COMPLETED | OUTPATIENT
Start: 2024-01-10 | End: 2024-01-10

## 2024-01-10 RX ORDER — PHENTOLAMINE MESYLATE 5 MG/1
5 INJECTION INTRAMUSCULAR; INTRAVENOUS ONCE
Status: COMPLETED | OUTPATIENT
Start: 2024-01-10 | End: 2024-01-10

## 2024-01-10 RX ADMIN — GABAPENTIN 100 MG: 100 CAPSULE ORAL at 15:44

## 2024-01-10 RX ADMIN — SODIUM CHLORIDE, POTASSIUM CHLORIDE, SODIUM LACTATE AND CALCIUM CHLORIDE: 600; 310; 30; 20 INJECTION, SOLUTION INTRAVENOUS at 23:57

## 2024-01-10 RX ADMIN — POTASSIUM CHLORIDE 40 MEQ: 1500 TABLET, EXTENDED RELEASE ORAL at 10:50

## 2024-01-10 RX ADMIN — OXYCODONE HYDROCHLORIDE 10 MG: 10 TABLET ORAL at 02:25

## 2024-01-10 RX ADMIN — CEFTRIAXONE 1000 MG: 1 INJECTION, POWDER, FOR SOLUTION INTRAMUSCULAR; INTRAVENOUS at 10:58

## 2024-01-10 RX ADMIN — PHENTOLAMINE MESYLATE 5 MG: 5 INJECTION, POWDER, FOR SOLUTION INTRAMUSCULAR; INTRAVENOUS at 18:29

## 2024-01-10 RX ADMIN — METOPROLOL TARTRATE 25 MG: 25 TABLET, FILM COATED ORAL at 09:10

## 2024-01-10 RX ADMIN — Medication 10 ML: at 20:22

## 2024-01-10 RX ADMIN — OXYCODONE HYDROCHLORIDE 10 MG: 10 TABLET ORAL at 12:04

## 2024-01-10 RX ADMIN — GABAPENTIN 100 MG: 100 CAPSULE ORAL at 05:48

## 2024-01-10 RX ADMIN — DIAZEPAM 5 MG: 5 TABLET ORAL at 20:26

## 2024-01-10 RX ADMIN — TAMSULOSIN HYDROCHLORIDE 0.4 MG: 0.4 CAPSULE ORAL at 09:10

## 2024-01-10 RX ADMIN — PHENYLEPHRINE HYDROCHLORIDE 45 MCG/MIN: 10 INJECTION INTRAVENOUS at 23:59

## 2024-01-10 RX ADMIN — SENNOSIDES, DOCUSATE SODIUM 1 TABLET: 8.6; 5 TABLET ORAL at 20:23

## 2024-01-10 RX ADMIN — ACETAMINOPHEN 1000 MG: 500 TABLET ORAL at 20:22

## 2024-01-10 RX ADMIN — GABAPENTIN 100 MG: 100 CAPSULE ORAL at 20:23

## 2024-01-10 RX ADMIN — SODIUM CHLORIDE, PRESERVATIVE FREE 10 ML: 5 INJECTION INTRAVENOUS at 20:22

## 2024-01-10 RX ADMIN — SODIUM CHLORIDE, POTASSIUM CHLORIDE, SODIUM LACTATE AND CALCIUM CHLORIDE: 600; 310; 30; 20 INJECTION, SOLUTION INTRAVENOUS at 10:13

## 2024-01-10 RX ADMIN — ACETAMINOPHEN 1000 MG: 500 TABLET ORAL at 15:45

## 2024-01-10 RX ADMIN — ENOXAPARIN SODIUM 40 MG: 100 INJECTION SUBCUTANEOUS at 09:09

## 2024-01-10 RX ADMIN — Medication 10 ML: at 08:59

## 2024-01-10 RX ADMIN — PHENYLEPHRINE HYDROCHLORIDE 40 MCG/MIN: 10 INJECTION INTRAVENOUS at 05:03

## 2024-01-10 RX ADMIN — ACETAMINOPHEN 1000 MG: 500 TABLET ORAL at 05:48

## 2024-01-10 ASSESSMENT — PAIN DESCRIPTION - DESCRIPTORS: DESCRIPTORS: ACHING;BURNING

## 2024-01-10 ASSESSMENT — PAIN SCALES - WONG BAKER
WONGBAKER_NUMERICALRESPONSE: 0

## 2024-01-10 ASSESSMENT — PAIN SCALES - GENERAL
PAINLEVEL_OUTOF10: 0
PAINLEVEL_OUTOF10: 9
PAINLEVEL_OUTOF10: 9
PAINLEVEL_OUTOF10: 0
PAINLEVEL_OUTOF10: 2
PAINLEVEL_OUTOF10: 9
PAINLEVEL_OUTOF10: 2
PAINLEVEL_OUTOF10: 1
PAINLEVEL_OUTOF10: 0
PAINLEVEL_OUTOF10: 0

## 2024-01-10 ASSESSMENT — PAIN DESCRIPTION - LOCATION
LOCATION: BACK
LOCATION: BACK

## 2024-01-10 ASSESSMENT — PAIN DESCRIPTION - ORIENTATION: ORIENTATION: LOWER

## 2024-01-10 NOTE — PROGRESS NOTES
Cardiology Progress Note     Admit Date:  1/6/2024    Consult reason/ Seen today for :       Subjective and  Overnight Events :  still tachycardic in pain and c/o back pain   Echo shows hyperdynamic ventricle    Chief complain on admission : 63 y.o.year old who is admitted for  Chief Complaint   Patient presents with    Back Pain     Low back pain from nursing home. Reports chronic pain but worse today. Reports a hard time stand d/t pain     Shoulder Pain     Left shoulder, chronic pain but worse today. NKI      Assessment / Plan:  Tachycardia in setting of pain and recent surgery ,continue  metoprolol 25 twice daily if blood pressure allows  Echo shows hyperdynamic ventricle  Malignancy workup as per primary team  HTN: stable, continue present medications   DVT prophylaxis if no contraindication  6.   Dyslipidemia: continue statins     Past medical history:    has a past medical history of Cerebral artery occlusion with cerebral infarction (HCC).  Past surgical history:   has a past surgical history that includes Lumbar spine surgery (N/A, 1/8/2024).  Social History:   reports that he has been smoking cigarettes. He has never used smokeless tobacco. He reports that he does not currently use alcohol. He reports that he does not use drugs.  Family history:  family history is not on file.    No Known Allergies    Review of Systems:    All 14 systems were reviewed and are negative  Except for the positive findings  which as documented     BP (!) 76/46   Pulse (!) 105   Temp 100 °F (37.8 °C) (Rectal)   Resp 16   Ht 1.626 m (5' 4\")   Wt 68.3 kg (150 lb 9.6 oz)   SpO2 96%   BMI 25.85 kg/m²     Intake/Output Summary (Last 24 hours) at 1/10/2024 1441  Last data filed at 1/10/2024 1400  Gross per 24 hour   Intake 2231.35 ml   Output 3450 ml   Net -1218.65 ml     Physical Exam:  Constitutional:  Well developed, Well nourished, No acute  7.6* 8.4*   HCT 31.6* 25.9* 24.8* 29.8*   MCV 87.1 89.9  --  100.3*   * 355  --  367     BMP:   Recent Labs     01/08/24  0009 01/09/24  0600 01/10/24  0619    143 141   K 3.7 4.0 3.4*    110 105   CO2 27 25 27   BUN 14 12 11   CREATININE 0.5* 0.4* 0.4*     PT/INR:   Recent Labs     01/08/24  0932   PROTIME 15.3*   INR 1.2     BNP:  No results for input(s): \"PROBNP\" in the last 72 hours.  TROPONIN: No results for input(s): \"TROPONINT\" in the last 72 hours.     ECHO :   echocardiogram    Assessment:  63 y.o.year old who is admitted for  Chief Complaint   Patient presents with    Back Pain     Low back pain from nursing home. Reports chronic pain but worse today. Reports a hard time stand d/t pain     Shoulder Pain     Left shoulder, chronic pain but worse today. NKI    , active issues as noted below:  Impression:  Principal Problem:    Spinal cord compression due to malignant neoplasm metastatic to spine (HCC)  Active Problems:    Weakness    Metastatic malignant neoplasm (HCC)    T7 vertebral fracture (HCC)    Septicemia (HCC)  Resolved Problems:    * No resolved hospital problems. *            All labs, medications and tests reviewed by myself , continue all other medications of all above medical condition listed as is except for changes mentioned above.    Thank you very much for consult , please call with questions.    Sayed Nicholas Bal MD, MD 1/10/2024 2:41 PM

## 2024-01-10 NOTE — PROGRESS NOTES
Patient R wrist has developed 5 small blisters due to infiltration of the iv site. Site is light red with minor swelling. Warm blanket is laying on wrist and 4 arm. Pulses confirmed at wrist via doppler.

## 2024-01-10 NOTE — PROGRESS NOTES
01/10/24 1002   Encounter Summary   Encounter Overview/Reason  Follow-up   Service Provided For: Patient   Referral/Consult From: Nurse   Support System Family members;Other (Comment)   Last Encounter  01/10/24  (follow up on request for HCPOA. Patient does not have information needed to complete the form. Patient asked this  to come back tomorrow as he hopes to have information needed.)   Complexity of Encounter Low   Begin Time 1000   End Time  1015   Total Time Calculated 15 min   Spiritual/Emotional needs   Type Spiritual Support   Grief, Loss, and Adjustments   Type Adjustment to illness;Life Adjustments   Assessment/Intervention/Outcome   Assessment Concerns with suffering;Anxious   Intervention Active listening;Empowerment;Prayer (assurance of)/[place]   Outcome Encouraged;Engaged in conversation;Expressed Gratitude;Receptive   Plan and Referrals   Plan/Referrals Continue Support (comment)

## 2024-01-10 NOTE — PROGRESS NOTES
[address] [address]   Progress Note  [facility] 1 2 3 4 5      Date: 1/10/2024   Patient: Sebastian Perkins   : 1960   DOA: 2024   MRN: [id number]   ROOM#: 2128/2128-A     Admit Date: 2024     Collaborating Urologist on Call at time of admission: Dr. Palacios  Chief Complaint:   Chief Complaint   Patient presents with    Back Pain     Low back pain from nursing home. Reports chronic pain but worse today. Reports a hard time stand d/t pain     Shoulder Pain     Left shoulder, chronic pain but worse today. NKI       Subjective:     Patient seen and examined.  Resting in bed.  Indwelling catheter draining clear yellow urine.    Objective:    Vitals:    /75   Pulse 90   Temp 99.3 °F (37.4 °C) (Oral)   Resp 14   Ht 1.626 m (5' 4\")   Wt 68.3 kg (150 lb 9.6 oz)   SpO2 98%   BMI 25.85 kg/m²    Temp  Av.6 °F (37.6 °C)  Min: 99.1 °F (37.3 °C)  Max: 100.4 °F (38 °C)     Intake/Output Summary (Last 24 hours) at 1/10/2024 0907  Last data filed at 1/10/2024 0800  Gross per 24 hour   Intake 2711.35 ml   Output 2420 ml   Net 291.35 ml         Physical Exam:   Gen: Pleasant male, in NAD  Neuro: Non-focal  Resp: Unlabored breathing  Abd: soft, non-distended, non-tender to palpation, no rebound  Back:   No CVAT  : Indwelling Victor catheter with clear yellow urine  Ext: No edema of bilateral LEs    Labs:   WBC:    Lab Results   Component Value Date/Time    WBC 14.9 01/10/2024 06:19 AM      Hemoglobin/Hematocrit:    Lab Results   Component Value Date/Time    HGB 8.4 01/10/2024 06:19 AM    HCT 29.8 01/10/2024 06:19 AM      BMP:   Lab Results   Component Value Date/Time     01/10/2024 06:19 AM    K 3.4 01/10/2024 06:19 AM     01/10/2024 06:19 AM    CO2 27 01/10/2024 06:19 AM    BUN 11 01/10/2024 06:19 AM    LABALBU 2.9 2024 06:00 AM    CREATININE 0.4 01/10/2024 06:19 AM    CALCIUM 8.1 01/10/2024 06:19 AM    GFRAA >60 2019 10:32 AM    LABGLOM >60 01/10/2024 06:19 AM  lymphadenopathy. Peritoneum/Retroperitoneum: The abdominal aorta is normal in caliber with moderate atherosclerosis.  No acute vascular abnormality identified.  No retroperitoneal or mesenteric lymphadenopathy is identified.  No free air or fluid is seen in the abdomen. Bones/Soft Tissues: Lytic destructive lesion within the right pubic bone with soft tissue extension into the adjacent musculature.  This measures approximately 2.7 cm on axial image 179.  1.1 cm lytic lesion in the left iliac wing on axial image 132.  No acute osseous or soft tissue abnormality.     1. Metastatic disease of uncertain origin with lytic lesions in the right pubic bone and left iliac wing, several suspicious hepatic lesions, and right hilar lymphadenopathy versus perihilar pulmonary nodules.  Recommend further evaluation with a CT of the chest. 2. 2.2 cm indeterminate right adrenal nodule.  Consider further evaluation with adrenal protocol MRI or CT. 3. Moderate prostatomegaly with moderate urinary bladder distension. Layering calculi within the urinary bladder lumen. 4. 1.5 cm indeterminate right renal cystic lesion.  Consider further evaluation with a renal protocol MRI or CT.  Suspected post treatment changes in the superior pole of the left kidney.  Recommend correlation with previous intervention. RECOMMENDATIONS: 2.2 cm right adrenal mass, probable benign adenoma. Recommend adrenal washout CT or chemical shift MRI. JACR 2017 Aug; 14(8):1038-44, JCAT 2016 Mar-Apr; 40(2):194-200, Urol J 2006 Spring; 3(2):71-4. 1.8 cm right solid pulmonary nodule detected on incomplete chest CT. Per Fleischner Society Guidelines, recommend prompt non-contrast Chest CT for further evaluation. These guidelines do not apply to immunocompromised patients and patients with cancer. Follow up in patients with significant comorbidities as clinically warranted. For lung cancer screening, adhere to Lung-RADS guidelines. Reference: Radiology. 2017;

## 2024-01-10 NOTE — PROGRESS NOTES
Comprehensive Nutrition Assessment    Type and Reason for Visit:  Reassess    Nutrition Recommendations/Plan:   Continue easy to chew diet  Offer standard and frozen oral nutrition supplements BID, prefers chocolate  Assist w/ tray set up at all meals; document intakes   Monitor weights, po intakes, labs, POC     Malnutrition Assessment:  Malnutrition Status:  At risk for malnutrition (Comment) (01/10/24 2551)    Context:  Acute Illness       Nutrition Assessment:    S/p laminectomy 1/8, oncology following for possible malignancy. Pt resting in bed, reports he is eating well here and PTA. Limited documented po intakes show pt consuming <50% of his meals during admit. He reports tolerating easy to chew diet well. Feeds self w/ R arm. Limited wt hx available for review; pt reports UBW around 140# w/ no sig changes recently. NFPE performed, mild wasting noted, at risk for malnutrition. Pt willing to trial a variety of oral supplements, prefers chocolate, will order. Follow at high nutrition risk.    Nutrition Related Findings:    LR @ 83ml/hr, +mahendra; MAP 88, hgb 7.6, K 3.4 Wound Type: Surgical Incision       Current Nutrition Intake & Therapies:    Average Meal Intake: 1-25%, 26-50%  Average Supplements Intake: None Ordered  ADULT DIET; Easy to Chew  ADULT ORAL NUTRITION SUPPLEMENT; Lunch, Dinner; Frozen Oral Supplement  ADULT ORAL NUTRITION SUPPLEMENT; Breakfast, Dinner; Standard High Calorie/High Protein Oral Supplement    Anthropometric Measures:  Height: 162.6 cm (5' 4\")  Ideal Body Weight (IBW): 130 lbs (59 kg)    Admission Body Weight: 63.5 kg (139 lb 15.9 oz)  Current Body Weight: 68.3 kg (150 lb 9.2 oz),   IBW. Weight Source: Bed Scale  Current BMI (kg/m2): 25.8  Usual Body Weight:  (n/a)     Weight Adjustment For: No Adjustment                 BMI Categories: Overweight (BMI 25.0-29.9)    Estimated Daily Nutrient Needs:  Energy Requirements Based On: Kcal/kg  Weight Used for Energy Requirements:

## 2024-01-10 NOTE — PROGRESS NOTES
Inpatient Progress Note 1/10/2024        Sebastian Perkins  1960  [id number]      Assessment/Plan:  Sebastian Perkins is a 63-year-old male who has a past medical history of CVA with Left sided weakness/deficit, HTN and metastic disease of unknown origin. He was presented with low back pain on 1/6/2024, imaging studies revealed T2 and T7 compression fractures He underwent a left T6-8 laminectomy, T5-T9 percutaneous instrumentation and fusion 1/8/2024 and transferred to ICU postoperatively.     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  GCS 14. S/p left T6-8 laminectomy, T5-T9 percutaneous instrumentation and fusion 1/8/2024. Neuro & spine assessments per protocol. Analgesics as needed for pain management. Scheduled acetaminophen. Continue Neurontin.    Cardio:  Maintain map goal >85 x72 hours postoperatively. Titrate Mahendra-synephrine to maintain map goal.   Resp: Supplemental O2 as needed to maintain SpO2 >93%  GI: No acute concerns. Bowel regimen   : CCP daily. Optimize lytes. No acute concerns. Victor for accurate I&O. IVF hydration with LR.   Heme: acute on chronic anemia. Hgb 8.4 Trend CBC. Transfuse for hgb <7.   ID: UTI. WBC 14.9 k, trend CBC daily. Continue Ceftriaxone x5 days.   Endo: No acute concerns.   MSK: Left Upper and Lower extremity weakness 2/2 previous CVA deficit.  Hemovac drain in place -with serosanguineous discharge +  PT/OT when ok per neurosurgery.     Tubes/Lines/Drains:    PIV, SANTOS drain, Victor     Code Status:   Full code    Subjective:    Patient seen and examined at bedside in ICU. Awake and alert, oriented x3. VSS. Remains on low dose  mahendra-synephrine to maintain map goal  - 14.9 %    Platelets 367 140 - 440 K/CU MM    MPV 10.0 7.5 - 11.1 FL   Basic Metabolic Panel    Collection Time: 01/10/24  6:19 AM   Result Value Ref Range    Sodium 141 135 - 145 MMOL/L    Potassium 3.4 (L) 3.5 - 5.1 MMOL/L    Chloride 105 99 - 110 mMol/L    CO2 27 21 - 32 MMOL/L    Anion Gap 9 7 - 16    Glucose 117 (H) 70 - 99 MG/DL    BUN 11 6 - 23 MG/DL    Creatinine 0.4 (L) 0.9 - 1.3 MG/DL    Est, Glom Filt Rate >60 >60 mL/min/1.73m2    Calcium 8.1 (L) 8.3 - 10.6 MG/DL     Critical care time: 40 min:  Critical care time does not include separately billable procedures      Electronically signed by Lanie Gonzalez PA-C on 1/10/2024 at 12:42 PM

## 2024-01-10 NOTE — PLAN OF CARE
Problem: Discharge Planning  Goal: Discharge to home or other facility with appropriate resources  1/10/2024 0758 by Uvaldo Stone RN  Outcome: Progressing  1/9/2024 2331 by Binta Rubio RN  Outcome: Progressing  1/9/2024 2331 by Binta Rubio RN  Outcome: Progressing     Problem: Pain  Goal: Verbalizes/displays adequate comfort level or baseline comfort level  1/10/2024 0758 by Uvaldo Stone RN  Outcome: Progressing  1/9/2024 2331 by Binta Rubio RN  Outcome: Progressing  1/9/2024 2331 by Binta Rubio RN  Outcome: Progressing     Problem: Skin/Tissue Integrity  Goal: Absence of new skin breakdown  Description: 1.  Monitor for areas of redness and/or skin breakdown  2.  Assess vascular access sites hourly  3.  Every 4-6 hours minimum:  Change oxygen saturation probe site  4.  Every 4-6 hours:  If on nasal continuous positive airway pressure, respiratory therapy assess nares and determine need for appliance change or resting period.  1/10/2024 0758 by Uvaldo Stone RN  Outcome: Progressing  1/9/2024 2331 by Binta Rubio RN  Outcome: Progressing  1/9/2024 2331 by Binta Rubio RN  Outcome: Progressing     Problem: ABCDS Injury Assessment  Goal: Absence of physical injury  1/10/2024 0758 by Uvaldo Stone RN  Outcome: Progressing  1/9/2024 2331 by Binta Rubio RN  Outcome: Progressing  1/9/2024 2331 by Binta Rubio RN  Outcome: Progressing     Problem: Chronic Conditions and Co-morbidities  Goal: Patient's chronic conditions and co-morbidity symptoms are monitored and maintained or improved  1/10/2024 0758 by Uvaldo Stone RN  Outcome: Progressing  1/9/2024 2331 by Binta Rubio RN  Outcome: Progressing  1/9/2024 2331 by Binta Rubio RN  Outcome: Progressing     Problem: Safety - Adult  Goal: Free from fall injury  1/10/2024 0758 by Uvaldo Stone RN  Outcome: Adequate for Discharge  1/9/2024 2331 by Binta Rubio RN  Outcome: Progressing  1/9/2024 2331 by Binta Rubio

## 2024-01-10 NOTE — PROGRESS NOTES
V2.0  [facility] Hospitalist Progress Note      Name:  Sebastian Perkins /Age/Sex: 1960  (63 y.o. male)   MRN & CSN:  [id number] & [id number] Encounter Date/Time: 1/10/2024 12:33 PM EST    Location:  -A PCP: No primary care provider on file.       Hospital Day: 5      Subjective:     Chief Complaint:  Back Pain (Low back pain from nursing home. Reports chronic pain but worse today. Reports a hard time stand d/t pain ) and Shoulder Pain (Left shoulder, chronic pain but worse today. NKI)     -Patient examined at bedside  -inconsistent physical exam - reports unable to more right leg, but was able to appreciate dorsiflexion of bilateral ankles    Assessment and Plan:   Metastatic disease of unknown origin  Worsening back and shoulder pain.  CT abdomen/pelvis on admission with lytic lesions in right pubic bone and left iliac wing, several suspicious hepatic lesions and right hilar lymphadenopathy versus perihilar pulmonary nodules.  Also an indeterminate 2.2 cm right adrenal nodule.  There is also a 1.5 cm indeterminate right renal cystic lesion.  CT chest showed lung mass, scapula mass and thoracic fractures  -Oncology onboard    Back pain likely 2/2 pathological thoracic fractures. CT showed compression injuries at T2 and T7  MRI showed pathological compression fracture T7 due to metastatis, with extention to epidural space.   -Neurosurgery onboard  -continue pain regimen Percocet q4h  -s/p thoracic fusion and epidural tumor decompression  -follow pathology report     SIRS 2/4 on admission. Tachycardia, leukocytosis  wBC 15.9-> 15.0. no sign of infection aside from UTI as below.   Has SOB suspect 2/2 atelectasis. CXR clear on admission  -on rocephin  -f/u blood cx - negative to date, urine cx showing mixed growth  -pro-viviana 0.447    UTI  Generalized weakness. Denies urinary symptoms has hematuria trauamatic from garcia placement  -Continue Rocephin, urine cx showing mixed growth    Urinary retention. Has  scapula. Compression injuries at T2 and T7. At the level of T6 and T7 a destructive mass can be seen posteriorly to the left destroying vertebral body and posterior elements as well as rib destructive changes. There is invasion into the spinal canal and epidural space. Moderate kyphotic deformity. RECOMMENDATIONS: Scapular mass would be amenable to safe biopsy.     CT ABDOMEN PELVIS W IV CONTRAST Additional Contrast? None    Result Date: 1/6/2024  EXAMINATION: CT OF THE ABDOMEN AND PELVIS WITH CONTRAST 1/6/2024 12:23 pm TECHNIQUE: CT of the abdomen and pelvis was performed with the administration of intravenous contrast. Multiplanar reformatted images are provided for review. Automated exposure control, iterative reconstruction, and/or weight based adjustment of the mA/kV was utilized to reduce the radiation dose to as low as reasonably achievable. COMPARISON: None. HISTORY: ORDERING SYSTEM PROVIDED HISTORY: abdominal bloating, tachycardic TECHNOLOGIST PROVIDED HISTORY: Additional Contrast?->None Reason for exam:->abdominal bloating, tachycardic Decision Support Exception - unselect if not a suspected or confirmed emergency medical condition->Emergency Medical Condition (MA) Reason for Exam: abdominal bloating, tachycardic FINDINGS: Lower Chest: 1.6 cm partially visualized right hilar node versus pulmonary nodule on axial image 1.  Similar 1.8 cm right hilar node versus nodule on axial image 5.  Mild bibasilar atelectasis. Organs: 1.1 cm indeterminate low-density lesion in the right hepatic lobe on axial image 27.  Probable focal fatty deposition within the left hepatic lobe adjacent to the falciform ligament.  1 cm ill-defined low-density lesion in the right hepatic lobe on axial image 42.  1.1 cm hyperattenuating lesion in the right hepatic lobe on axial image 30.  Questionable ill-defined low-density lesion in the left hepatic lobe measuring 3.3 cm on axial image 44.  The gallbladder is normal in appearance.  No

## 2024-01-10 NOTE — PLAN OF CARE
Problem: Discharge Planning  Goal: Discharge to home or other facility with appropriate resources  1/9/2024 2331 by Binta Rubio RN  Outcome: Progressing  1/9/2024 2331 by Binta Rubio RN  Outcome: Progressing  1/9/2024 1228 by Mary Monk RN  Outcome: Progressing     Problem: Pain  Goal: Verbalizes/displays adequate comfort level or baseline comfort level  1/9/2024 2331 by Binta Rubio RN  Outcome: Progressing  1/9/2024 2331 by Binta Rubio RN  Outcome: Progressing  1/9/2024 1228 by Mary Monk RN  Outcome: Progressing     Problem: Safety - Adult  Goal: Free from fall injury  1/9/2024 2331 by Binta Rubio RN  Outcome: Progressing  1/9/2024 2331 by Binta Rubio RN  Outcome: Progressing  1/9/2024 1228 by Mary Monk RN  Outcome: Progressing     Problem: Skin/Tissue Integrity  Goal: Absence of new skin breakdown  Description: 1.  Monitor for areas of redness and/or skin breakdown  2.  Assess vascular access sites hourly  3.  Every 4-6 hours minimum:  Change oxygen saturation probe site  4.  Every 4-6 hours:  If on nasal continuous positive airway pressure, respiratory therapy assess nares and determine need for appliance change or resting period.  1/9/2024 2331 by Binta Rubio RN  Outcome: Progressing  1/9/2024 2331 by Binta Rubio RN  Outcome: Progressing  1/9/2024 1228 by Mary Monk RN  Outcome: Progressing     Problem: ABCDS Injury Assessment  Goal: Absence of physical injury  1/9/2024 2331 by Binta Rubio RN  Outcome: Progressing  1/9/2024 2331 by Binta Rubio RN  Outcome: Progressing  1/9/2024 1228 by Mary Monk RN  Outcome: Progressing     Problem: Chronic Conditions and Co-morbidities  Goal: Patient's chronic conditions and co-morbidity symptoms are monitored and maintained or improved  1/9/2024 2331 by Binta Rubio RN  Outcome: Progressing  1/9/2024 2331 by Binta Rubio RN  Outcome: Progressing  1/9/2024 1228 by Mary Monk  RN  Outcome: Progressing     Problem: Nutrition Deficit:  Goal: Optimize nutritional status  1/9/2024 2331 by Binta Rubio, RN  Outcome: Progressing  1/9/2024 2331 by Binta Rubio, RN  Outcome: Progressing  1/9/2024 1228 by Mary Monk RN  Outcome: Progressing

## 2024-01-10 NOTE — PROGRESS NOTES
Neurosurgery Progress Note  1/10/2024 9:23 AM      Left T6-8 laminectomy, t5-9 percutaneous instrumentation and fusion   POD: 1    Assessment and Plan:   S/P thoracic fusion and epidural tumor decompression- multiple specimens sent to pathology, frozen specimen returning as malignant. Final path report pending. Keep MAP>85 for 72 hours. Continue to monitor patient, switch to every 4 hours neurochecks later today at 10pm. CBC 8.4 this am. Incentive spirometer encouraged. Keep patient on sides and turn frequently. Do not lay on back. Patient will need aggressive rehab.  Metastatic disease- unknown primary, oncology, Dr. Rollins on board. Have consulted rad once. Will need radiation after incisions heal, ideally 2 weeks post-op.   Hx of right MCA stroke- patient with significant left upper and lower extremity weakness and sensory loss after stroke in [place] several years ago. Has been residing in a nursing facility since.     Supervising physician: Nallely Ragland MD.  Dr. Ragland was readily and continuously available by phone for direct consultation regarding the care of this patient.    Dragon dictation may have been used in the writing of this note. Spelling or word errors may have occurred. Please call for clarification if there are concerns.      Subjective:   Admit Date: 1/6/2024  PCP: No primary care provider on file.    Patient sitting up in bed, continues to have burning pain in his right leg. He is able to move his right leg better today and to command. Has pain in his midback where is incision is. He sat edge of bed yesterday with therapy for a few seconds.     Data:   Scheduled Meds:   enoxaparin  40 mg SubCUTAneous Daily    acetaminophen  1,000 mg Oral 3 times per day    gabapentin  100 mg Oral 3 times per day    metoprolol tartrate  25 mg Oral BID    sodium chloride flush  5-40 mL IntraVENous 2 times per day    polyethylene glycol  17 g Oral Daily    sennosides-docusate sodium  1 tablet Oral BID    tamsulosin   0.4 mg Oral Daily    sodium chloride flush  5-40 mL IntraVENous 2 times per day    cefTRIAXone (ROCEPHIN) IV  1,000 mg IntraVENous Q24H         I/O last 3 completed shifts:  In: 2866.1 [P.O.:720; I.V.:1596.1; IV Piggyback:550]  Out: 2970 [Urine:2625; Drains:45; Blood:300]  I/O this shift:  In: 1991.4 [I.V.:1991.4]  Out: 0     Intake/Output Summary (Last 24 hours) at 1/10/2024 0923  Last data filed at 1/10/2024 0800  Gross per 24 hour   Intake 2591.35 ml   Output 2420 ml   Net 171.35 ml     CULTURE results: Invalid input(s): \"BLOOD CULTURE\", \"URINE CULTURE\", \"SURGICAL CULTURE\"  CBC:   Recent Labs     01/08/24  0009 01/09/24  0827 01/09/24  1530 01/10/24  0619   WBC 15.0* 13.2*  --  14.9*   HGB 10.1* 8.1* 7.6* 8.4*   * 355  --  367     BMP:    Recent Labs     01/08/24  0009 01/09/24  0600 01/10/24  0619    143 141   K 3.7 4.0 3.4*    110 105   CO2 27 25 27   BUN 14 12 11   CREATININE 0.5* 0.4* 0.4*   GLUCOSE 124* 139* 117*     Hepatic:   Recent Labs     01/07/24  1221 01/08/24  0009 01/09/24  0600   AST 11* 13* 21   ALT 9* 10 10   BILITOT 0.2 0.2 0.3   ALKPHOS 95 101 85         IMAGING: available xray, CT, and MRI results reviewed    Xray thoracic 1/9/24  IMPRESSION:  Status post posterior fusion from T5 through T9 without complication  identified.    Objective:   Vitals: /75   Pulse 90   Temp 99.3 °F (37.4 °C) (Oral)   Resp 14   Ht 1.626 m (5' 4\")   Wt 68.3 kg (150 lb 9.6 oz)   SpO2 98%   BMI 25.85 kg/m²   General appearance: alert, sitting up in bed, in no distress   HEENT: normocephalic, atraumatic, EOMI  DERM: no significant rashes or lesions identified  Neurologic: Mental status: alert and oriented to self, place, situation, speech clear and coherent, no facial droop, follows commands briskly, sensation intact in right upper extremities, burning sensation in right foot.   Extremities: left upper and lower extremity 0/5 throughout, right upper extremity 5/5 throughout, right

## 2024-01-10 NOTE — ACP (ADVANCE CARE PLANNING)
Advance Care Planning     Advance Care Planning Inpatient Note  [facility] Department    Today's Date: 1/10/2024  Unit: [facility] ICU    Received request from patient.  Upon review of chart and communication with care team, patient's decision making abilities are not in question.. Patient was/were present in the room during visit.    Goals of ACP Conversation:  Discuss advance care planning documents    Health Care Decision Makers:       Summary:  No Decision Maker named by patient at this time    Advance Care Planning Documents (Patient Wishes):  None This visit was a follow up on request for assistance with HCPOA. Patient does not have information needed to complete the form. Patient asked this  to come back tomorrow as he hopes his sister plan to be here to have information needed.     Assessment:  Patient was in good spirit during the visit. Patient asked for prayer support.    Interventions:  Provided education on documents for clarity and greater understanding    Care Preferences Communicated:   No    Outcomes/Plan:  ACP Discussion: Postponed    Electronically signed by Chaplain Kellee on 1/10/2024 at 10:07 AM

## 2024-01-11 PROBLEM — E44.0 MODERATE MALNUTRITION (HCC): Status: ACTIVE | Noted: 2024-01-11

## 2024-01-11 LAB
ANION GAP SERPL CALCULATED.3IONS-SCNC: 8 MMOL/L (ref 7–16)
BUN SERPL-MCNC: 10 MG/DL (ref 6–23)
CALCIUM SERPL-MCNC: 7.5 MG/DL (ref 8.3–10.6)
CHLORIDE BLD-SCNC: 106 MMOL/L (ref 99–110)
CO2: 25 MMOL/L (ref 21–32)
CREAT SERPL-MCNC: 0.4 MG/DL (ref 0.9–1.3)
CULTURE: NORMAL
CULTURE: NORMAL
GFR SERPL CREATININE-BSD FRML MDRD: >60 ML/MIN/1.73M2
GLUCOSE SERPL-MCNC: 118 MG/DL (ref 70–99)
HCT VFR BLD CALC: 23.6 % (ref 42–52)
HEMOGLOBIN: 7.5 GM/DL (ref 13.5–18)
Lab: NORMAL
Lab: NORMAL
MCH RBC QN AUTO: 28.3 PG (ref 27–31)
MCHC RBC AUTO-ENTMCNC: 31.8 % (ref 32–36)
MCV RBC AUTO: 89.1 FL (ref 78–100)
PDW BLD-RTO: 15.9 % (ref 11.7–14.9)
PLATELET # BLD: 364 K/CU MM (ref 140–440)
PMV BLD AUTO: 10.7 FL (ref 7.5–11.1)
POTASSIUM SERPL-SCNC: 4.2 MMOL/L (ref 3.5–5.1)
RBC # BLD: 2.65 M/CU MM (ref 4.6–6.2)
SODIUM BLD-SCNC: 139 MMOL/L (ref 135–145)
SPECIMEN: NORMAL
SPECIMEN: NORMAL
WBC # BLD: 13.8 K/CU MM (ref 4–10.5)

## 2024-01-11 PROCEDURE — 97530 THERAPEUTIC ACTIVITIES: CPT

## 2024-01-11 PROCEDURE — 6360000002 HC RX W HCPCS: Performed by: STUDENT IN AN ORGANIZED HEALTH CARE EDUCATION/TRAINING PROGRAM

## 2024-01-11 PROCEDURE — 2580000003 HC RX 258: Performed by: STUDENT IN AN ORGANIZED HEALTH CARE EDUCATION/TRAINING PROGRAM

## 2024-01-11 PROCEDURE — 2000000000 HC ICU R&B

## 2024-01-11 PROCEDURE — 99232 SBSQ HOSP IP/OBS MODERATE 35: CPT | Performed by: INTERNAL MEDICINE

## 2024-01-11 PROCEDURE — 80048 BASIC METABOLIC PNL TOTAL CA: CPT

## 2024-01-11 PROCEDURE — 99211 OFF/OP EST MAY X REQ PHY/QHP: CPT

## 2024-01-11 PROCEDURE — 6370000000 HC RX 637 (ALT 250 FOR IP): Performed by: NEUROLOGICAL SURGERY

## 2024-01-11 PROCEDURE — 97112 NEUROMUSCULAR REEDUCATION: CPT

## 2024-01-11 PROCEDURE — 94761 N-INVAS EAR/PLS OXIMETRY MLT: CPT

## 2024-01-11 PROCEDURE — 2700000000 HC OXYGEN THERAPY PER DAY

## 2024-01-11 PROCEDURE — 85027 COMPLETE CBC AUTOMATED: CPT

## 2024-01-11 PROCEDURE — 6370000000 HC RX 637 (ALT 250 FOR IP): Performed by: PHYSICIAN ASSISTANT

## 2024-01-11 PROCEDURE — 6370000000 HC RX 637 (ALT 250 FOR IP): Performed by: INTERNAL MEDICINE

## 2024-01-11 PROCEDURE — 6370000000 HC RX 637 (ALT 250 FOR IP): Performed by: STUDENT IN AN ORGANIZED HEALTH CARE EDUCATION/TRAINING PROGRAM

## 2024-01-11 PROCEDURE — 2580000003 HC RX 258: Performed by: ANESTHESIOLOGY

## 2024-01-11 PROCEDURE — 76937 US GUIDE VASCULAR ACCESS: CPT

## 2024-01-11 PROCEDURE — 6360000002 HC RX W HCPCS: Performed by: PHYSICIAN ASSISTANT

## 2024-01-11 PROCEDURE — 99233 SBSQ HOSP IP/OBS HIGH 50: CPT | Performed by: INTERNAL MEDICINE

## 2024-01-11 RX ADMIN — ACETAMINOPHEN 650 MG: 325 TABLET ORAL at 10:59

## 2024-01-11 RX ADMIN — OXYCODONE HYDROCHLORIDE 10 MG: 10 TABLET ORAL at 19:53

## 2024-01-11 RX ADMIN — OXYCODONE HYDROCHLORIDE 10 MG: 10 TABLET ORAL at 11:49

## 2024-01-11 RX ADMIN — CEFTRIAXONE 1000 MG: 1 INJECTION, POWDER, FOR SOLUTION INTRAMUSCULAR; INTRAVENOUS at 11:53

## 2024-01-11 RX ADMIN — SENNOSIDES, DOCUSATE SODIUM 1 TABLET: 8.6; 5 TABLET ORAL at 19:53

## 2024-01-11 RX ADMIN — SODIUM CHLORIDE, POTASSIUM CHLORIDE, SODIUM LACTATE AND CALCIUM CHLORIDE: 600; 310; 30; 20 INJECTION, SOLUTION INTRAVENOUS at 15:01

## 2024-01-11 RX ADMIN — GABAPENTIN 100 MG: 100 CAPSULE ORAL at 14:27

## 2024-01-11 RX ADMIN — SODIUM CHLORIDE, PRESERVATIVE FREE 10 ML: 5 INJECTION INTRAVENOUS at 19:54

## 2024-01-11 RX ADMIN — GABAPENTIN 100 MG: 100 CAPSULE ORAL at 21:58

## 2024-01-11 RX ADMIN — Medication 10 ML: at 19:53

## 2024-01-11 RX ADMIN — DIAZEPAM 5 MG: 5 TABLET ORAL at 07:44

## 2024-01-11 RX ADMIN — DIAZEPAM 5 MG: 5 TABLET ORAL at 21:58

## 2024-01-11 RX ADMIN — ACETAMINOPHEN 1000 MG: 500 TABLET ORAL at 21:58

## 2024-01-11 RX ADMIN — SODIUM CHLORIDE, PRESERVATIVE FREE 10 ML: 5 INJECTION INTRAVENOUS at 07:23

## 2024-01-11 RX ADMIN — METOPROLOL TARTRATE 25 MG: 25 TABLET, FILM COATED ORAL at 19:53

## 2024-01-11 RX ADMIN — OXYCODONE HYDROCHLORIDE 5 MG: 5 TABLET ORAL at 07:44

## 2024-01-11 RX ADMIN — TAMSULOSIN HYDROCHLORIDE 0.4 MG: 0.4 CAPSULE ORAL at 07:44

## 2024-01-11 RX ADMIN — Medication 10 ML: at 07:23

## 2024-01-11 RX ADMIN — PHENYLEPHRINE HYDROCHLORIDE 10 MCG/MIN: 10 INJECTION INTRAVENOUS at 21:52

## 2024-01-11 RX ADMIN — ENOXAPARIN SODIUM 40 MG: 100 INJECTION SUBCUTANEOUS at 07:43

## 2024-01-11 RX ADMIN — GABAPENTIN 100 MG: 100 CAPSULE ORAL at 05:44

## 2024-01-11 RX ADMIN — METOPROLOL TARTRATE 25 MG: 25 TABLET, FILM COATED ORAL at 07:44

## 2024-01-11 RX ADMIN — ACETAMINOPHEN 1000 MG: 500 TABLET ORAL at 05:44

## 2024-01-11 ASSESSMENT — PAIN SCALES - GENERAL
PAINLEVEL_OUTOF10: 4
PAINLEVEL_OUTOF10: 7
PAINLEVEL_OUTOF10: 0
PAINLEVEL_OUTOF10: 0
PAINLEVEL_OUTOF10: 3
PAINLEVEL_OUTOF10: 3
PAINLEVEL_OUTOF10: 0
PAINLEVEL_OUTOF10: 3
PAINLEVEL_OUTOF10: 4
PAINLEVEL_OUTOF10: 0
PAINLEVEL_OUTOF10: 8

## 2024-01-11 ASSESSMENT — PAIN DESCRIPTION - ORIENTATION
ORIENTATION: MID;LEFT
ORIENTATION: MID

## 2024-01-11 ASSESSMENT — PAIN - FUNCTIONAL ASSESSMENT: PAIN_FUNCTIONAL_ASSESSMENT: PREVENTS OR INTERFERES SOME ACTIVE ACTIVITIES AND ADLS

## 2024-01-11 ASSESSMENT — PAIN DESCRIPTION - DESCRIPTORS
DESCRIPTORS: ACHING;CRUSHING
DESCRIPTORS: DULL;CRAMPING;ACHING

## 2024-01-11 ASSESSMENT — PAIN DESCRIPTION - LOCATION
LOCATION: CHEST
LOCATION: CHEST
LOCATION: BACK

## 2024-01-11 ASSESSMENT — PAIN SCALES - WONG BAKER: WONGBAKER_NUMERICALRESPONSE: 0

## 2024-01-11 NOTE — PROGRESS NOTES
Physical Therapy  Name: Sebastian Perkins MRN: [id number] :   1960   Date:  2024   Admission Date: 2024 Room:  [address] [place] [address]   Restrictions/Precautions:        Spinal Precautions, Hemovac, General Precautions, Fall Risk, DO NOT lay on back per neurosx    Communication with other providers:  Uvaldo AGUILAR states pt is ok to see for therapy.   COTX with KRISTEN Gonzalez per patient tolerance and safety with rehab. sOTA present and assists with treatment    Discharge recommendation: SNF    Subjective:  Patient states:  \"I'm done\" Patient is agreeable for rehab and asks to return to bed soon after completing supine to sit. Encouragement helps patient tolerate 5 minutes on EOB  Pain:   Location, Type, Intensity (0/10 to 10/10):  Demos pain with mobility but does not rate on scale    Objective:    Observation:  Patient Lt side lying upon arrival.     Vitals : Patient is on 2 L NC O2   's with activity    Treatment, including education/measures:    Transfers with line management of Tele Monitor, Pulse OX, BP Cuff, Victor, Temp Wire, Hemovac, NC O2  Rolling: Mod A x 2. HOB flat, with cues for sequencing.   Supine to sit :Max A x 2, with HOB elevated. Assist for BLE and trunk management  Seated balance: Patient requires Max A initially for static sitting, progressed to Mod A with ARNOLD set up    Neuro-mercedes: Facilitated posture awareness with verbal and tactile cues for seated upright posture. Manual assist needed for trunk adjustment to midline. Tolerates 5 minutes with heavy encouragement  Sit to supine :Dep x 2 for trunk and BLE management   Scooting :Seated scooting Mod A x 1 for hip advancement and Mod A x 2 for trunk stability     Positioning: Patient positioned on Rt side with pillows and bolster along Lt side, pillow under Lt hip, between knees and under ortiz LE. HOB 30 degrees per aspiration precautions    Safety  Patient left safely in the bed, with call light/phone in reach with alarm applied. Gait belt used for

## 2024-01-11 NOTE — PROGRESS NOTES
Hemovac drain removed, clot noted in tubing. He had drainage of sanguinous material from exit site. Gauze and tegaderm placed over exit site, re-enforce as needed.

## 2024-01-11 NOTE — PROGRESS NOTES
Inpatient Progress Note 1/11/2024        Sebastian Perkins  1960  [id number]      Assessment/Plan:  Sebastian Perkins is a 63-year-old male who has a past medical history of CVA with Left sided weakness/deficit, HTN and metastic disease of unknown origin. He was presented with low back pain on 1/6/2024, imaging studies revealed T2 and T7 compression fractures He underwent a left T6-8 laminectomy, T5-T9 percutaneous instrumentation and fusion 1/8/2024 and transferred to ICU postoperatively.     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  GCS 14. S/p left T6-8 laminectomy, T5-T9 percutaneous instrumentation and fusion 1/8/2024. Neuro & spine assessments per protocol, every 4 hours from 10 PM tonight, Hemovac drain was removed this afternoon, analgesics as needed for pain management. Scheduled acetaminophen. Continue Neurontin.    Cardio:  Maintain map goal >85 x72 hours postoperatively, . Titrate Yony-synephrine to maintain map goal, currently at 35 mcg/min  Resp: Supplemental O2 as needed to maintain SpO2 >93%  GI: No acute concerns. Bowel regimen   : CCP daily. Optimize lytes. No acute concerns. Vicotr for accurate I&O. IVF hydration with LR.   Heme: acute on chronic anemia. Hgb 7.5, Trend CBC. Transfuse for hgb <7.  No overt signs of bleeding  ID: UTI. WBC 13.8 k, trend CBC daily.  Completed 5-day course of ceftriaxone for UTI .   Endo: No acute concerns.   MSK: IV infiltrated in the left upper extremity which was running phenylephrine drip, yesterday 1/10/2024- s/p phentolamine , pulses and sensation intact, this morning there is no swelling , no tenderness, pulses intact.  Left Upper and Lower  3. There is extension into the left posterior paraspinal soft tissues at T7. 4. No additional lesion is seen involving the thoracic spine. 5. Partial visualization of expansile lesion involving the left scapula. These results were sent to the CORE Team on 1/8/2024 at 9:49 am to be communicated to the referring/covering health care provider/office.       Recent Results (from the past 24 hour(s))   CBC    Collection Time: 01/11/24  3:50 AM   Result Value Ref Range    WBC 13.8 (H) 4.0 - 10.5 K/CU MM    RBC 2.65 (L) 4.6 - 6.2 M/CU MM    Hemoglobin 7.5 (L) 13.5 - 18.0 GM/DL    Hematocrit 23.6 (L) 42 - 52 %    MCV 89.1 78 - 100 FL    MCH 28.3 27 - 31 PG    MCHC 31.8 (L) 32.0 - 36.0 %    RDW 15.9 (H) 11.7 - 14.9 %    Platelets 364 140 - 440 K/CU MM    MPV 10.7 7.5 - 11.1 FL   Basic Metabolic Panel    Collection Time: 01/11/24  3:50 AM   Result Value Ref Range    Sodium 139 135 - 145 MMOL/L    Potassium 4.2 3.5 - 5.1 MMOL/L    Chloride 106 99 - 110 mMol/L    CO2 25 21 - 32 MMOL/L    Anion Gap 8 7 - 16    Glucose 118 (H) 70 - 99 MG/DL    BUN 10 6 - 23 MG/DL    Creatinine 0.4 (L) 0.9 - 1.3 MG/DL    Est, Glom Filt Rate >60 >60 mL/min/1.73m2    Calcium 7.5 (L) 8.3 - 10.6 MG/DL     Critical care time: 40 min:  Critical care time does not include separately billable procedures      Electronically signed by Lanie Gonzalez PA-C on 1/11/2024 at 1:46 PM

## 2024-01-11 NOTE — PROGRESS NOTES
Cardiology Progress Note     Admit Date:  1/6/2024    Consult reason/ Seen today for :       Subjective and  Overnight Events : Hr has improved in sinus  in pain and c/o back pain   Echo shows hyperdynamic ventricle    Chief complain on admission : 63 y.o.year old who is admitted for  Chief Complaint   Patient presents with    Back Pain     Low back pain from nursing home. Reports chronic pain but worse today. Reports a hard time stand d/t pain     Shoulder Pain     Left shoulder, chronic pain but worse today. NKI      Assessment / Plan:  Tachycardia in setting of pain and recent surgery ,continue  metoprolol 25 twice daily if blood pressure allows  Echo shows hyperdynamic ventricle  Malignancy workup as per primary team  HTN: stable, continue present medications   DVT prophylaxis if no contraindication  6.   Dyslipidemia: continue statins   Will see as needed basis    Past medical history:    has a past medical history of Cerebral artery occlusion with cerebral infarction (HCC).  Past surgical history:   has a past surgical history that includes Lumbar spine surgery (N/A, 1/8/2024).  Social History:   reports that he has been smoking cigarettes. He has never used smokeless tobacco. He reports that he does not currently use alcohol. He reports that he does not use drugs.  Family history:  family history is not on file.    No Known Allergies    Review of Systems:    All 14 systems were reviewed and are negative  Except for the positive findings  which as documented     BP (!) 150/77   Pulse 98   Temp 99.1 °F (37.3 °C) (Rectal)   Resp 17   Ht 1.626 m (5' 4\")   Wt 68.3 kg (150 lb 9.6 oz)   SpO2 99%   BMI 25.85 kg/m²     Intake/Output Summary (Last 24 hours) at 1/11/2024 1437  Last data filed at 1/11/2024 1200  Gross per 24 hour   Intake 2551.35 ml   Output 2335 ml   Net 216.35 ml     Physical Exam:  Constitutional:  Well developed,  29.8* 23.6*   MCV 89.9  --  100.3* 89.1     --  367 364     BMP:   Recent Labs     01/09/24  0600 01/10/24  0619 01/11/24  0350    141 139   K 4.0 3.4* 4.2    105 106   CO2 25 27 25   BUN 12 11 10   CREATININE 0.4* 0.4* 0.4*     PT/INR:   No results for input(s): \"PROTIME\", \"INR\" in the last 72 hours.    BNP:  No results for input(s): \"PROBNP\" in the last 72 hours.  TROPONIN: No results for input(s): \"TROPONINT\" in the last 72 hours.     ECHO :   echocardiogram    Assessment:  63 y.o.year old who is admitted for  Chief Complaint   Patient presents with    Back Pain     Low back pain from nursing home. Reports chronic pain but worse today. Reports a hard time stand d/t pain     Shoulder Pain     Left shoulder, chronic pain but worse today. NKI    , active issues as noted below:  Impression:  Principal Problem:    Spinal cord compression due to malignant neoplasm metastatic to spine (HCC)  Active Problems:    Weakness    Metastatic malignant neoplasm (HCC)    T7 vertebral fracture (HCC)    Septicemia (HCC)    Moderate malnutrition (HCC)  Resolved Problems:    * No resolved hospital problems. *            All labs, medications and tests reviewed by myself , continue all other medications of all above medical condition listed as is except for changes mentioned above.    Thank you very much for consult , please call with questions.    Sayed Nicholas Bal MD, MD 1/11/2024 2:37 PM

## 2024-01-11 NOTE — DISCHARGE INSTR - DIET
Good nutrition is important when healing from an illness, injury, or surgery.  Follow any nutrition recommendations given to you during your hospital stay.   If you were given an oral nutrition supplement while in the hospital, continue to take this supplement at home.  You can take it with meals, in-between meals, and/or before bedtime. These supplements can be purchased at most local grocery stores, pharmacies, and chain super-stores.   If you have any questions about your diet or nutrition, call the hospital and ask for the dietitian.  Due to malnutrition diagnosis, after discharge, RD recommends patient to drink high calorie, high protein oral nutrition supplements of greater than 200 calories per serving with at least or greater than 10 gm protein per serving, at least 2-3 times per day, to promote increased po intakes and weight gain. Coupons and High Calorie, High Protein Nutrition Therapy handouts provided.     Suggestions to follow at home to improve appetite:   Aim for small, frequent eating sessions. (I.e. 5-8 times per day of smaller portions)   Schedule eating times (I.e. every 2-4 hours would have a snack)   Enhance the eating environment (I.e. Eating with family and friends, watching favorite movie, or listening to favorite music with meal)   Identify problem smells taste, textures, and temperatures  Choose foods that are easy to chew and swallow   Avoid drinking fluids during eating session. Save drinks and sips between meals.     There is no need to apply all of these strategies at once.     Many patients benefit from adding 1 or 2 suggestions at a time to see how they affect their ability to eat, and then making an informed decision on how to proceed.   Please read Nutrition handout below:     High Calorie, High Protein Nutrition Therapy from Nutrition Care Manual     A high-calorie, high-protein diet has been recommended to you. Your registered dietitian nutritionist (RDN) may have recommended  226   Fiber, total dietary g 35   Sugars, total g 77   Minerals   Calcium, Ca mg 1466   Iron, Fe mg 14   Sodium, Na mg 3256   Vitamins   Vitamin C, total ascorbic acid mg 80   Vitamin A, IU IU 01495   Vitamin D    Lipids   Fatty acids, total saturated g 37   Fatty acids, total monounsaturated g 52   Fatty acids, total polyunsaturated g 30   Cholesterol mg 549        High-Calorie, High-Protein Sample 1-Day Menu View Nutrient Info  Update  Delete  Breakfast 1 egg, scrambled  1 ounce cheddar cheese  1 English muffin, whole wheat  1 tablespoon margarine  1 tablespoon jam  ½ cup orange juice, fortified with calcium and vitamin D   Morning Snack 1 tablespoon peanut butter  1 banana  1 cup 1% milk   Lunch Tuna salad sandwich made with: 2 slices bread, whole wheat  3 ounces tuna mixed with:  1 tablespoon mayonnaise  ½ cup pudding   Afternoon Snack ¼ cup hummus  ½ cup carrots  1 jeremy   Evening Meal Enchilada casserole made with: 2 corn tortillas  3 ounces ground beef, cooked  ½ cup black beans, cooked  ½ cup corn, cooked  1 ounce grated cheddar cheese  ¼ cup enchilada sauce  ½ avocado, sliced, topping for enchilada  1 tablespoon sour cream, topping for enchilada  Salad: ½ cup lettuce, shredded  ½ cup tomatoes, chopped, for salad  1 tablespoon olive oil and vinegar dressing, for salad   Evening Snack ¾ cup Greek yogurt  ½ cup blueberries  ½ cup granola   Daily Sum  Nutrient Unit Value   Macronutrients   Energy kcal 3013   Energy kJ 83206   Protein g 144   Total lipid (fat) g 130   Carbohydrate, by difference g 336   Fiber, total dietary g 47   Sugars, total g 110   Minerals   Calcium, Ca mg 1882   Iron, Fe mg 19   Sodium, Na mg 2823   Vitamins   Vitamin C, total ascorbic acid mg 95   Vitamin A, IU IU 43337   Vitamin D    Lipids   Fatty acids, total saturated g 38   Fatty acids, total monounsaturated g 51   Fatty acids, total polyunsaturated g 30   Cholesterol mg 520        High-Calorie, High-Protein Vegan Sample

## 2024-01-11 NOTE — PROGRESS NOTES
Patient Name:  Sebastian Perkins  Patient :  1960  Patient MRN:  [id number]     Primary Oncologist: Jose Rollins MD  Referring Provider: No primary care provider on file.    Sebastian Perkins is a 63 y.o. male with medical history significant for h/o stroke with left sided weakness, presented to [facility] on 24 after he was unable to sit up and fell back in bed.      Patient is limited with his mobility at baseline due to prior stroke which is immobilized his left upper and lower extremities.  Uses a wheelchair at baseline.  Reports back/shoulder pain.      CT abdomen/pelvis on 24 showed metastatic disease of uncertain origin with lytic lesions in the right pubic bone and left iliac wing, several suspicious hepatic lesions, and right hilar lymphadenopathy versus perihilar pulmonary nodules. 2.2 cm indeterminate right adrenal nodule.   Moderate prostatomegaly with moderate urinary bladder distension. Layering calculi within the urinary bladder lumen.1.5 cm indeterminate right renal cystic lesion. Suspected post treatment changes in the superior pole of the left kidney.     I was called to evaluate him. He has lost weight lately. He is a smoker and he is a residence of [facility].     CT Chest 2023 showed compression injuries at T2, T7, destructive mass at T6/T7 with posterior destruction of vertebral body, invasion into spinal canal and epidural space, interstitial nodularity, RLL mass 1.6 x 1.6 cm, L scapular mass measuring 5.9 x 8.2 cm with destructive changes.     Interval 1/10/24  Pt was seen and examined. Had surgery yesterday. He still has weakness in R leg and chronic deficits are present on L. Will await pathology results.     Labs today showed Cr 0.4, Ca 8.1, WBC 14.9, Hb 8.4, platelet 367      Vital Signs: BP (!) 140/67   Pulse (!) 117   Temp 100 °F (37.8 °C) (Rectal)   Resp 15   Ht 1.626 m (5' 4\")   Wt 68.3 kg (150 lb 9.6 oz)   SpO2 95%   BMI 25.85 kg/m²      Physical Exam:  CONSTITUTIONAL:     PROT 4.9 (L) 01/09/2024    LABALBU 2.9 (L) 01/09/2024    BILITOT 0.3 01/09/2024    ALKPHOS 85 01/09/2024    AST 21 01/09/2024    ALT 10 01/09/2024    LABGLOM >60 01/10/2024    GFRAA >60 07/24/2019    MG 2.1 01/06/2024     No results found for: \"MMA\", \"LDH\", \"HOMOCYSTEINE\"  No results found for: \"LD\"  Lab Results   Component Value Date    TSHHS 0.277 01/09/2024     Immunology:  Lab Results   Component Value Date    PROT 4.9 (L) 01/09/2024     No results found for: \"KAPPAUVOL\", \"LAMBDAUVOL\", \"KLFLCR\"  No results found for: \"B2M\"  Coagulation Panel:  Lab Results   Component Value Date    PROTIME 15.3 (H) 01/08/2024    INR 1.2 01/08/2024    APTT 29.7 01/08/2024     Anemia Panel:  No results found for: \"IXNBHFUU72\", \"FOLATE\"  Tumor Markers:  Lab Results   Component Value Date    CEA 31.8 (H) 01/07/2024     4 01/07/2024        XR THORACIC SPINE (2 VIEWS)   Final Result   Status post posterior fusion from T5 through T9 without complication   identified.         FL LESS THAN 1 HOUR   Final Result      CT THORACIC SPINE WO CONTRAST   Final Result   Unchanged pathologic compression fracture at T7 with associated soft tissue   density extending to the central canal and invading the posterior elements   and adjacent posterior rib.      Unchanged left scapular mass.      Known hepatic lesions not well identified.  Unchanged right adrenal lesion.      Unchanged age-indeterminate mild compression deformity at T2.         MRI THORACIC SPINE W WO CONTRAST   Final Result   1. Pathologic compression deformity of the T7 vertebral body due to bony   metastasis with an expansile appearance of the left posterior elements of T7.   2. Extra osseous extension into the left dorsal and lateral epidural space at   T7 contribute to severe spinal canal stenosis as well as T2 hyperintensity   within the thoracic cord centered at T7.   3. There is extension into the left posterior paraspinal soft tissues at T7.   4. No additional lesion is  oncology evaluation.      He will need non emergent MRI abdomen to further evaluation his right kidney lesion and adrenal nodule.     Jose Rollins MD

## 2024-01-11 NOTE — PROGRESS NOTES
Neurosurgery Progress Note  1/11/2024 9:29 AM    Left T6-8 laminectomy, t5-9 percutaneous instrumentation and fusion   POD: 2    Assessment and Plan:   S/P thoracic fusion and epidural tumor decompression- multiple specimens sent to pathology, frozen specimen returning as malignant. Final path report pending. Keep MAP>85 for 72 hours. Yony on board if needed. Continue to monitor patient, switch to every 4 hours neurochecks later today at 10pm. CBC 7.5 this am, continue to monitor. Incentive spirometer encouraged. Keep patient on sides and turn frequently. Do not lay on back. Patient will need aggressive rehab. Prophylactic lovenox on board. Keep garcia in place for now.   Metastatic disease- unknown primary, oncology, Dr. Rollins on board. Have consulted rad once. Will need radiation after incisions heal, ideally 2 weeks post-op.   Hx of right MCA stroke- patient with significant left upper and lower extremity weakness and sensory loss after stroke in [place] several years ago. Has been residing in a nursing facility since.     Supervising physician: Nallely Ragland MD.  Dr. Ragland was readily and continuously available by phone for direct consultation regarding the care of this patient.    Dragon dictation may have been used in the writing of this note. Spelling or word errors may have occurred. Please call for clarification if there are concerns.      Subjective:   Admit Date: 1/6/2024  PCP: No primary care provider on file.    Patient laying in bed, has some increased sensation in his right leg with continued burning pain in foot. He is able to flex his right knee intermittently to command. His right shoulder pain has improved. Has some left shoulder pain today.     Data:   Scheduled Meds:   enoxaparin  40 mg SubCUTAneous Daily    acetaminophen  1,000 mg Oral 3 times per day    gabapentin  100 mg Oral 3 times per day    [Held by provider] metoprolol tartrate  25 mg Oral BID    sodium chloride flush  5-40 mL IntraVENous  toes slightly on the right.   Incision: none   Drains: none      Electronically signed by Laura Barber PA-C on 1/11/2024 at 9:29 AM

## 2024-01-11 NOTE — PROGRESS NOTES
V2.0  [facility] Hospitalist Progress Note      Name:  Sebastian Perkins /Age/Sex: 1960  (63 y.o. male)   MRN & CSN:  [id number] & [id number] Encounter Date/Time: 2024 12:33 PM EST    Location:  -A PCP: No primary care provider on file.       Hospital Day: 6      Subjective:     Chief Complaint:  Back Pain (Low back pain from nursing home. Reports chronic pain but worse today. Reports a hard time stand d/t pain ) and Shoulder Pain (Left shoulder, chronic pain but worse today. NKI)     -Patient examined at bedside  - reports unable to more right leg, but was able to dorsiflex bilateral ankles and flex at knee    Assessment and Plan:   Metastatic disease of unknown origin  Worsening back and shoulder pain.  CT abdomen/pelvis on admission with lytic lesions in right pubic bone and left iliac wing, several suspicious hepatic lesions and right hilar lymphadenopathy versus perihilar pulmonary nodules.  Also an indeterminate 2.2 cm right adrenal nodule.  There is also a 1.5 cm indeterminate right renal cystic lesion.  CT chest showed lung mass, scapula mass and thoracic fractures  -Oncology onboard-plan for radiation oncology to see him once pathology is available    Back pain likely    Pathological thoracic fractures. CT showed compression injuries at T2 and T7  MRI showed pathological compression fracture T7 due to metastatis, with extention to epidural space.   -Neurosurgery onboard  -continue pain regimen Percocet q4h  -s/p thoracic fusion and epidural tumor decompression  -follow pathology report  -PT/OT team on board-plan for SNF     SIRS 2/4 on admission.   Hypotension likely in the setting of spinal shock  Tachycardia, leukocytosis  wBC 15.9-> 15.0. no sign of infection aside from UTI as below.   Has SOB suspect 2/2 atelectasis. CXR clear on admission  -on rocephin  -f/u blood cx - negative to date, urine cx showing mixed growth  -pro-viviana 0.447  -weaning off phenylephrine    UTI  Generalized  clear.  Heart and mediastinal structures appear normal.  Bony thorax is unremarkable.     No acute cardiopulmonary process.       Electronically signed by Aniya Valencia MD on 1/11/2024 at 11:58 AM

## 2024-01-11 NOTE — PROGRESS NOTES
Occupational Therapy    Occupational Therapy Treatment Note    Name: Sebastian Perkins MRN: [id number] :   1960   Date:  2024   Admission Date: 2024 Room:  -A     Per OTR/L Discharge Recommendation: Skilled Nursing Facility     Primary Problem:  he primary encounter diagnosis was Septicemia (HCC). Diagnoses of Acute cystitis with hematuria, Metastatic malignant neoplasm, unspecified site (HCC), Generalized weakness, Tumor associated pain, and Chest pain, unspecified type were also pertinent to this visit     Restrictions/Precautions: spinal precautions, hemovac, general precautions, fall risk, do not lay on back per neurosx      Communication with other providers: RN approved session, co tx with PTA for mobility    Subjective:  Patient states:  Pt agreeable to therapy session.   Pain:   Location, Type, Intensity (0/10 to 10/10):  noted pain in back did not rate    Objective:    Observation: Pt side lying in bed upon arrival.    Objective Measures:  Pt alert to self     Treatment, including education:  Therapeutic Activity Training:   Therapeutic activity training was instructed today.  Cues were given for safety, sequence, UE/LE placement, awareness, and balance.    Activities performed today included bed mobility training, sup-sit, sit-stand, SPT.    Pt side lying in bed upon arrival and agreeable to therapy session with encouragement. Pt completed supine to sit with MAX A x2 with verbal cues for hand placement and technique. Pt completed scooted to edge of bed with MAX A x2 with use of bed sheet and pad and verbal cues for use of R hand in positioning. Pt seated edge of bed with MAX and progressed to MOD A. Pt demonstrated ability to sit edge of bed approx 5 minutes. Pt returned supine in bed with MAX A x2 and rolled in bed side to side for linens with MOD A X2. Pt positioned in bed with bolster for back.  Pt L hand positioning on pillow for edema management.  Pt needs met and call light in  reach.     Assessment / Impression:    Patient's tolerance of treatment: fair-  Adverse Reaction: None  Significant change in status and impact:   Barriers to improvement: None noted      Plan for Next Session:    Continue OT POC and progress sitting edge of bed ADLs.     Time in:  0922  Time out:  0949  Timed treatment minutes:  27  Total treatment time:  27      Electronically signed by:    ANNA Holley,   1/11/2024, 10:17 AM

## 2024-01-11 NOTE — CONSULTS
[facility] Nurse  Consult Note       Sebastian Perkins  AGE: 63 y.o.   GENDER: male  : 1960  TODAY'S DATE:  2024    Subjective:     Reason for  Evaluation and Assessment: wound care eval.      Sebastian Perkins is a 63 y.o. male referred by:   [x] Physician  [] Nursing  [] Other:     Wound Identification:  Wound Type:  blisters from IV infiltrate site   Contributing Factors:  IV infilrated        PAST MEDICAL HISTORY        Diagnosis Date    Cerebral artery occlusion with cerebral infarction (HCC)        PAST SURGICAL HISTORY    Past Surgical History:   Procedure Laterality Date    LUMBAR SPINE SURGERY N/A 2024    LEFT T6-8 LAMINECTOMY FOR RESECTION OF EXTRADURAL TUMOR, T5-9 PERCUTANEOUS INSTRUMENTATION performed by Nallely Ragland MD at [facility] OR       FAMILY HISTORY    History reviewed. No pertinent family history.    SOCIAL HISTORY    Social History     Tobacco Use    Smoking status: Every Day     Current packs/day: 0.25     Types: Cigarettes    Smokeless tobacco: Never   Substance Use Topics    Alcohol use: Not Currently    Drug use: Never       ALLERGIES    No Known Allergies    MEDICATIONS    No current facility-administered medications on file prior to encounter.     No current outpatient medications on file prior to encounter.         Objective:      BP (!) 146/68   Pulse 92   Temp 99.1 °F (37.3 °C) (Rectal)   Resp 16   Ht 1.626 m (5' 4\")   Wt 68.3 kg (150 lb 9.6 oz)   SpO2 95%   BMI 25.85 kg/m²   Domenico Risk Score: Domenico Scale Score: 13    LABS    CBC:   Lab Results   Component Value Date/Time    WBC 13.8 2024 03:50 AM    RBC 2.65 2024 03:50 AM    HGB 7.5 2024 03:50 AM    HCT 23.6 2024 03:50 AM    MCV 89.1 2024 03:50 AM    MCH 28.3 2024 03:50 AM    MCHC 31.8 2024 03:50 AM    RDW 15.9 2024 03:50 AM     2024 03:50 AM    MPV 10.7 2024 03:50 AM     CMP:    Lab Results   Component Value Date/Time

## 2024-01-11 NOTE — PROGRESS NOTES
Comprehensive Nutrition Assessment    Type and Reason for Visit:  Reassess    Nutrition Recommendations/Plan:   Continue current diet and oral nutrition supplement  Assist w/ tray set up at all meals; document po intakes   Monitor weights, po intakes, labs, POC      Malnutrition Assessment:  Malnutrition Status:  Moderate malnutrition (01/11/24 0955)    Context:  Acute Illness     Findings of the 6 clinical characteristics of malnutrition:  Energy Intake:  50% or less of estimated energy requirements for 5 or more days  Weight Loss:  No significant weight loss (per pt)     Body Fat Loss:  Mild body fat loss Triceps   Muscle Mass Loss:  Mild muscle mass loss Thigh (quadraceps), Hand (interosseous)  Fluid Accumulation:  No significant fluid accumulation     Strength:  Not Performed    Nutrition Assessment:    Pt up eating breakfast, feeding self, states he dislikes chicken, fish, and all vegetables, will add to diet preferences. Now meets criteria for malnutrition due to decreased po intakes, mild muscle wasting. Does like Ensure, needs assist opening it, Has not yet tried frozen oral supp but is willing to. Still awaiting pathology results from laminectomy. Continue to follow at high nutrition risk at this time.    Nutrition Related Findings:    LR @ 83ml/hr, +mahendra; MAP 88, hgb 7.6, K 3.4 Wound Type: Surgical Incision       Current Nutrition Intake & Therapies:    Average Meal Intake: 1-25%, 26-50%  Average Supplements Intake: Unable to assess  ADULT DIET; Easy to Chew  ADULT ORAL NUTRITION SUPPLEMENT; Lunch, Dinner; Frozen Oral Supplement  ADULT ORAL NUTRITION SUPPLEMENT; Breakfast, Dinner; Standard High Calorie/High Protein Oral Supplement    Anthropometric Measures:  Height: 162.6 cm (5' 4\")  Ideal Body Weight (IBW): 130 lbs (59 kg)    Admission Body Weight: 63.5 kg (139 lb 15.9 oz)  Current Body Weight: 68.3 kg (150 lb 9.2 oz),   IBW. Weight Source: Bed Scale  Current BMI (kg/m2): 25.8  Usual Body Weight:

## 2024-01-12 ENCOUNTER — CLINICAL DOCUMENTATION (OUTPATIENT)
Dept: ONCOLOGY | Age: 64
End: 2024-01-12

## 2024-01-12 LAB
24HR URINE VOLUME (ML): 2400
CREATININE URINE: 34 MG/DL
CREATININE, 24H UR: 816 MG/D (ref 800–2100)
TIME URINE: 24
VMA & CREAT 24H UR-IMP: NORMAL
VMA 24H UR-MCNC: 1.4 MG/L
VMA 24H UR-MRATE: 3.4 MG/D (ref 0–7)
VMA/CREAT 24H UR: 4 MG/GCR (ref 0–6)

## 2024-01-12 PROCEDURE — 2580000003 HC RX 258: Performed by: STUDENT IN AN ORGANIZED HEALTH CARE EDUCATION/TRAINING PROGRAM

## 2024-01-12 PROCEDURE — 6370000000 HC RX 637 (ALT 250 FOR IP): Performed by: STUDENT IN AN ORGANIZED HEALTH CARE EDUCATION/TRAINING PROGRAM

## 2024-01-12 PROCEDURE — 2700000000 HC OXYGEN THERAPY PER DAY

## 2024-01-12 PROCEDURE — 99232 SBSQ HOSP IP/OBS MODERATE 35: CPT | Performed by: PHYSICIAN ASSISTANT

## 2024-01-12 PROCEDURE — 94150 VITAL CAPACITY TEST: CPT

## 2024-01-12 PROCEDURE — 94664 DEMO&/EVAL PT USE INHALER: CPT

## 2024-01-12 PROCEDURE — 6370000000 HC RX 637 (ALT 250 FOR IP): Performed by: PHYSICIAN ASSISTANT

## 2024-01-12 PROCEDURE — 6370000000 HC RX 637 (ALT 250 FOR IP): Performed by: NEUROLOGICAL SURGERY

## 2024-01-12 PROCEDURE — 99024 POSTOP FOLLOW-UP VISIT: CPT | Performed by: PHYSICIAN ASSISTANT

## 2024-01-12 PROCEDURE — 6360000002 HC RX W HCPCS: Performed by: PHYSICIAN ASSISTANT

## 2024-01-12 PROCEDURE — 6370000000 HC RX 637 (ALT 250 FOR IP): Performed by: INTERNAL MEDICINE

## 2024-01-12 PROCEDURE — 97530 THERAPEUTIC ACTIVITIES: CPT

## 2024-01-12 PROCEDURE — 92526 ORAL FUNCTION THERAPY: CPT

## 2024-01-12 PROCEDURE — 2580000003 HC RX 258: Performed by: ANESTHESIOLOGY

## 2024-01-12 PROCEDURE — 2060000000 HC ICU INTERMEDIATE R&B

## 2024-01-12 PROCEDURE — 94761 N-INVAS EAR/PLS OXIMETRY MLT: CPT

## 2024-01-12 RX ADMIN — GABAPENTIN 100 MG: 100 CAPSULE ORAL at 15:55

## 2024-01-12 RX ADMIN — ACETAMINOPHEN 1000 MG: 500 TABLET ORAL at 15:55

## 2024-01-12 RX ADMIN — ACETAMINOPHEN 1000 MG: 500 TABLET ORAL at 21:30

## 2024-01-12 RX ADMIN — OXYCODONE HYDROCHLORIDE 10 MG: 10 TABLET ORAL at 22:39

## 2024-01-12 RX ADMIN — METOPROLOL TARTRATE 25 MG: 25 TABLET, FILM COATED ORAL at 21:16

## 2024-01-12 RX ADMIN — ACETAMINOPHEN 1000 MG: 500 TABLET ORAL at 05:49

## 2024-01-12 RX ADMIN — SODIUM CHLORIDE, POTASSIUM CHLORIDE, SODIUM LACTATE AND CALCIUM CHLORIDE: 600; 310; 30; 20 INJECTION, SOLUTION INTRAVENOUS at 04:47

## 2024-01-12 RX ADMIN — TAMSULOSIN HYDROCHLORIDE 0.4 MG: 0.4 CAPSULE ORAL at 10:36

## 2024-01-12 RX ADMIN — SODIUM CHLORIDE, PRESERVATIVE FREE 10 ML: 5 INJECTION INTRAVENOUS at 21:19

## 2024-01-12 RX ADMIN — GABAPENTIN 100 MG: 100 CAPSULE ORAL at 05:50

## 2024-01-12 RX ADMIN — ENOXAPARIN SODIUM 40 MG: 100 INJECTION SUBCUTANEOUS at 10:36

## 2024-01-12 RX ADMIN — METOPROLOL TARTRATE 25 MG: 25 TABLET, FILM COATED ORAL at 10:36

## 2024-01-12 RX ADMIN — OXYCODONE HYDROCHLORIDE 10 MG: 10 TABLET ORAL at 05:50

## 2024-01-12 RX ADMIN — GABAPENTIN 100 MG: 100 CAPSULE ORAL at 21:30

## 2024-01-12 RX ADMIN — Medication 10 ML: at 10:37

## 2024-01-12 RX ADMIN — SENNOSIDES, DOCUSATE SODIUM 1 TABLET: 8.6; 5 TABLET ORAL at 21:16

## 2024-01-12 RX ADMIN — Medication 10 ML: at 21:19

## 2024-01-12 ASSESSMENT — PAIN SCALES - GENERAL
PAINLEVEL_OUTOF10: 8
PAINLEVEL_OUTOF10: 0
PAINLEVEL_OUTOF10: 0
PAINLEVEL_OUTOF10: 7
PAINLEVEL_OUTOF10: 0
PAINLEVEL_OUTOF10: 0

## 2024-01-12 ASSESSMENT — PAIN DESCRIPTION - ORIENTATION: ORIENTATION: RIGHT

## 2024-01-12 ASSESSMENT — PAIN DESCRIPTION - PAIN TYPE: TYPE: ACUTE PAIN

## 2024-01-12 ASSESSMENT — PAIN DESCRIPTION - DESCRIPTORS: DESCRIPTORS: ACHING;BURNING

## 2024-01-12 ASSESSMENT — PAIN DESCRIPTION - LOCATION: LOCATION: SHOULDER

## 2024-01-12 ASSESSMENT — PAIN DESCRIPTION - FREQUENCY: FREQUENCY: INTERMITTENT

## 2024-01-12 NOTE — PROGRESS NOTES
[facility]  DEPARTMENT OF SPEECH/LANGUAGE PATHOLOGY  DAILY PROGRESS NOTE  Sebastian Perkins  1/12/2024  [id number]  Weakness [R53.1]  Septicemia (HCC) [A41.9]  Acute cystitis with hematuria [N30.01]  Metastatic malignant neoplasm, unspecified site (HCC) [C79.9]  No Known Allergies      Pt was seen this date for dysphagia treatment.       IMPRESSION AND RECOMMENDATIONS: Sebastian Perkins was seen for dysphagia follow-up. He was seated upright in bed, alert, cooperative. Voicing with reduced loudness, minimally improved with max cues. Pt requested diet advancement from easy to chew consistencies. He was presented with trials of thin liquids via straw, soft and regular solids. Oral stage remains mildly impaired, with adequate labial seal, reduced strength to bite into regular solids, slow/decreased mastication with regular solids, adequate clearance with all textures. Pharyngeal stage appears WFL without s/s aspiration. Pt also observed with pills administered by RN, whole with water, with intact oropharyngeal swallow and no s/s aspiration. Reviewed general aspiration precautions with pt.    Recommend continuing easy to chew diet/thin liquids. This appears to be least restrictive diet at this time. No further acute SLP needs identified.     GOALS (current status in bold):  Short-term Goals  Timeframe for Short-term Goals: 1 week  Goal 1: Pt will tolerate Easy to Chew/Thins with adequate mastication/clearance and 0 s/s aspiration 100% Meeting  Goal 2: Caregivers will  use aspiration precautions for all PO intake 100% Meeting        EDUCATION: recommendations    PAIN RATING (0-10 Scale): does not rate, reported discomfort and cold, repositioning and warm blanket provided  Time in/Time out: SLP Individual Minutes  Time In: 1045  Time Out: 1100  Minutes: 15    Visit number: 2      ALFREDO Thomas  1/12/2024  11:32 AM

## 2024-01-12 NOTE — FLOWSHEET NOTE
LEATHA GATES RN--pt notified me this afternoon after you left that he does not wish to go back to [facility] and wants to go somewhere else--could you check on that for him when you return please

## 2024-01-12 NOTE — PROGRESS NOTES
[address] [address]   Progress Note  [facility] 1 2 3 4 5      Date: 2024   Patient: Sebastian Perkins   : 1960   DOA: 2024   MRN: [id number]   ROOM#: [id number]     Admit Date: 2024     Collaborating Urologist on Call at time of admission: Dr. Palacios  Chief Complaint:   Chief Complaint   Patient presents with    Back Pain     Low back pain from nursing home. Reports chronic pain but worse today. Reports a hard time stand d/t pain     Shoulder Pain     Left shoulder, chronic pain but worse today. NKI       Subjective:     Patient seen and examined.  Resting in bed.  Indwelling catheter draining clear yellow urine.    Objective:    Vitals:    /66   Pulse (!) 105   Temp 98.1 °F (36.7 °C) (Oral)   Resp 13   Ht 1.626 m (5' 4\")   Wt 68.3 kg (150 lb 9.6 oz)   SpO2 97%   BMI 25.85 kg/m²    Temp  Av.9 °F (37.2 °C)  Min: 97.7 °F (36.5 °C)  Max: 99.7 °F (37.6 °C)     Intake/Output Summary (Last 24 hours) at 2024 1007  Last data filed at 2024 0601  Gross per 24 hour   Intake 5.73 ml   Output 3050 ml   Net -1024.27 ml         Physical Exam:   Gen: Pleasant male, in NAD  Neuro: Non-focal  Resp: Unlabored breathing  Abd: soft, non-distended, non-tender to palpation, no rebound  Back:   No CVAT  : Indwelling Victor catheter with clear yellow urine  Ext: No edema of bilateral LEs    Labs:   WBC:    Lab Results   Component Value Date/Time    WBC 13.8 2024 03:50 AM      Hemoglobin/Hematocrit:    Lab Results   Component Value Date/Time    HGB 7.5 2024 03:50 AM    HCT 23.6 2024 03:50 AM      BMP:   Lab Results   Component Value Date/Time     2024 03:50 AM    K 4.2 2024 03:50 AM     2024 03:50 AM    CO2 25 2024 03:50 AM    BUN 10 2024 03:50 AM    LABALBU 2.9 2024 06:00 AM    CREATININE 0.4 2024 03:50 AM    CALCIUM 7.5 2024 03:50 AM    GFRAA >60 2019 10:32 AM    LABGLOM >60 2024  Support Exception - unselect if not a suspected or confirmed emergency medical condition->Emergency Medical Condition (MA) Reason for Exam: abdominal bloating, tachycardic FINDINGS: Lower Chest: 1.6 cm partially visualized right hilar node versus pulmonary nodule on axial image 1.  Similar 1.8 cm right hilar node versus nodule on axial image 5.  Mild bibasilar atelectasis. Organs: 1.1 cm indeterminate low-density lesion in the right hepatic lobe on axial image 27.  Probable focal fatty deposition within the left hepatic lobe adjacent to the falciform ligament.  1 cm ill-defined low-density lesion in the right hepatic lobe on axial image 42.  1.1 cm hyperattenuating lesion in the right hepatic lobe on axial image 30.  Questionable ill-defined low-density lesion in the left hepatic lobe measuring 3.3 cm on axial image 44.  The gallbladder is normal in appearance.  No biliary ductal dilatation. The pancreas, spleen, and left adrenal gland are unremarkable.  2.2 cm indeterminate right adrenal nodule with average Hounsfield units of approximately 85.  1.5 cm indeterminate right renal cystic lesion with average Hounsfield units of approximately 30 on axial image 73.  Several subcentimeter cystic lesions in the bilateral kidneys too small to definitively characterize but likely to represent benign cysts.  Suspected post treatment changes in the superior pole of the left kidney.  No hydroureteronephrosis.  Nonobstructing 5 mm calculus in the lower pole of the left kidney. GI/Bowel: Colonic diverticulosis without evidence of acute diverticulitis. Normal appendix.  The stomach and small bowel are normal in appearance.  No obstruction or wall thickening identified. Pelvis: The urinary bladder is moderately distended with multiple diverticula.  No abnormal wall thickening identified.  Layering calculi within the urinary bladder lumen are seen.  Moderate prostatomegaly.  No free fluid in the pelvis.  No pelvic or inguinal

## 2024-01-12 NOTE — PROGRESS NOTES
Attending attestation note    Attending note and attestation:   [x] I personally saw and evaluated the patient.  I discussed the case with the DAISY provider, reviewed the medical record, and agree with the findings and plan as documented in their portion of this note and take responsibility for the patient management.    [] I was present with the DAISY provider during the interview and exam.  I discussed the case with the DAISY provider, reviewed the medical record and agree with the findings and plan as documented in the provider portion of this note.    [x]  I personally interviewed and examined the patient and reviewed the medical record including the radiographs, laboratory data. I agree with the findings and plan as documented in the DAISY provider's portion of this note and take responsibility for the patient management.      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  He is otherwise awake alert, postop day 3 noted to have baseline left-sided hemiparesis with 5 out of 5 in the right upper extremity 1-2 out of 5 in the right lower extremity with no change in sensation or strength. HDS, no requiring MAP goals.  Otherwise on room air, continue aggressive pulmonary toileting.  Advance diet as tolerated, GI prophylaxis, creatinine appropriate, urine

## 2024-01-12 NOTE — PROGRESS NOTES
Per provider, [facility] ordered and medical records including letter of Medical Necessity submitted via [facility] Portal for further molecular testing to benefit and determine treatment planning for patient. Specimen #[id number].  Tumor Profiling Requisition also faxed to [facility] pathology at [phone].

## 2024-01-12 NOTE — CARE COORDINATION
Discussed in IDR. Plan is return to [facility] when medically ready. Will need precert. Erin Resendiz RN

## 2024-01-12 NOTE — PROGRESS NOTES
Inpatient Progress Note 1/12/2024        Sebastian Perkins  1960  [id number]      Assessment/Plan:  Sebastian Perkins is a 63-year-old male who has a past medical history of CVA with Left sided weakness/deficit, HTN and metastic disease of unknown origin. He was presented with low back pain on 1/6/2024, imaging studies revealed T2 and T7 compression fractures He underwent a left T6-8 laminectomy, T5-T9 percutaneous instrumentation and fusion 1/8/2024 and transferred to ICU postoperatively.     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 GCS 14. S/p left T6-8 laminectomy, T5-T9 percutaneous instrumentation and fusion 1/8/2024. Neuro & spine assessments per protocol. Hemovac drain was removed this afternoon, analgesics as needed for pain management. Scheduled acetaminophen. Continue Neurontin.    Cardio:  HDS. Maintain SBP < 180.  Resp: Supplemental O2 as needed to maintain SpO2 >93%  GI: No acute concerns. Bowel regimen   : CCP daily. Optimize lytes. No acute concerns. Victor for accurate I&O.   Heme: Acute on chronic anemia. Hgb 7.5, trend CBC. Transfuse for hgb <7.  No overt signs of bleeding  ID: UTI. WBC 13.8 k, trend CBC daily.  Completed 5-day course of ceftriaxone for UTI .   Endo: No acute concerns.   MSK: IV infiltrated in the left upper extremity which was running phenylephrine drip, 1/10/2024- s/p phentolamine, pulses and sensation intact, this morning there is no swelling , no tenderness, pulses intact. Left Upper and Lower extremity weakness 2/2 previous CVA deficit.  PT/OT when ok per neurosurgery.     Tubes/Lines/Drains:    PIV, SANTOS drain, Victor     Code Status:   Full

## 2024-01-12 NOTE — CONSULTS
Radiation Oncology Consultation  Encounter Date: 2024 7:12 AM    Mr. Sebastian Perkins is a 63 y.o. male  : 1960  MRN: [id number]  [id number]  Requesting Provider: Presley Vásquez MD        CONSULTANT: Terence Villegas MD    PHYSICIANS:   Primary Care: No primary care provider on file.   Dr. Rollins    DIAGNOSIS: metastatic carcinoma to bone and liver, possible lung origin?    HPI:     Mr. Perkins is a 63-year-old male who I am seeing as a new patient for consultation of radiotherapy treatment options.    He has limited mobility at baseline due to his stroke.  Immobilized his left upper and lower extremities.  He uses a wheelchair at baseline.  He does have some back/shoulder pain especially on the left.    He presented to [facility] 2024 because of new weakness in the right leg.  CT abdomen pelvis on 2024 showed metastatic disease of uncertain origin including lesions in the right pubic bone and left iliac wing as well as several suspicious hepatic lesions as well as right hilar lymphadenopathy.  There is also a 2.2 cm indeterminate right adrenal nodule.  CT chest same day showed compression fractures at T2, T7, and obstructive mass at T6/T7 with posterior distraction vertebral body invasion of the spinal canal and epidural space as well as a right lower lobe mass 1.6 x 1.6 cm, and the left scapular mass measuring 5.9 x 8.2 cm with destructive changes.    He underwent surgery on 2024.  There was a left T6-8 laminectomy and T5-9 percutaneous instrumentation. Done okay since then. R leg weakness maybe slightly better. Pathology returned as metastatic carcinoma and the report suggested immunohistochemical profile was most suggestive of an upper GI/pancreaticobiliary primary.    He has not had radiation therapy before.       Past Medical History:   Diagnosis Date    Cerebral artery occlusion with cerebral infarction (HCC)         Past Surgical History:   Procedure Laterality Date     30.  Questionable ill-defined low-density lesion in the left hepatic lobe measuring 3.3 cm on axial image 44.  The gallbladder is normal in appearance.  No biliary ductal dilatation. The pancreas, spleen, and left adrenal gland are unremarkable.  2.2 cm indeterminate right adrenal nodule with average Hounsfield units of approximately 85.  1.5 cm indeterminate right renal cystic lesion with average Hounsfield units of approximately 30 on axial image 73.  Several subcentimeter cystic lesions in the bilateral kidneys too small to definitively characterize but likely to represent benign cysts.  Suspected post treatment changes in the superior pole of the left kidney.  No hydroureteronephrosis.  Nonobstructing 5 mm calculus in the lower pole of the left kidney. GI/Bowel: Colonic diverticulosis without evidence of acute diverticulitis. Normal appendix.  The stomach and small bowel are normal in appearance.  No obstruction or wall thickening identified. Pelvis: The urinary bladder is moderately distended with multiple diverticula.  No abnormal wall thickening identified.  Layering calculi within the urinary bladder lumen are seen.  Moderate prostatomegaly.  No free fluid in the pelvis.  No pelvic or inguinal lymphadenopathy. Peritoneum/Retroperitoneum: The abdominal aorta is normal in caliber with moderate atherosclerosis.  No acute vascular abnormality identified.  No retroperitoneal or mesenteric lymphadenopathy is identified.  No free air or fluid is seen in the abdomen. Bones/Soft Tissues: Lytic destructive lesion within the right pubic bone with soft tissue extension into the adjacent musculature.  This measures approximately 2.7 cm on axial image 179.  1.1 cm lytic lesion in the left iliac wing on axial image 132.  No acute osseous or soft tissue abnormality.     1. Metastatic disease of uncertain origin with lytic lesions in the right pubic bone and left iliac wing, several suspicious hepatic lesions, and right

## 2024-01-12 NOTE — PROGRESS NOTES
Neurosurgery Progress Note  1/12/2024 1:36 PM       Left T6-8 laminectomy, t5-9 percutaneous instrumentation and fusion   POD: 3    Assessment and Plan:   S/P thoracic fusion and epidural tumor decompression- multiple specimens sent to pathology, frozen specimen returning as malignant. Final path report showing concern for GI/pancreaticobiliary primary. CBC 7.5 yesterday, am labs pending, continue to monitor. Incentive spirometer encouraged. Keep patient on sides and turn frequently. Do not lay on back. Patient will need aggressive rehab. Prophylactic lovenox on board. Keep garcia in place for now until mobility has improved.   Metastatic disease- unknown primary, oncology, Dr. Rollins on board. Have consulted rad once. Will need radiation after incisions heal, ideally 2 weeks post-op. Rad once has seen the patient, appreciate their assistance. Will need to be arranged transport to present the week of 1/22 for radiation.   Hx of right MCA stroke- patient with significant left upper and lower extremity weakness and sensory loss after stroke in [place] several years ago. Has been residing in a nursing facility since.     Supervising physician: Nallely Ragland MD.  Dr. Ragland was readily and continuously available by phone for direct consultation regarding the care of this patient.    Dragon dictation may have been used in the writing of this note. Spelling or word errors may have occurred. Please call for clarification if there are concerns.      Subjective:   Admit Date: 1/6/2024  PCP: No primary care provider on file.    Patient sitting up in bed, states he is doing well. Has some pain off and on in bilateral shoulders. Has no new N/T in his arms. He is able to wiggle his toes to command. He was encouraged to work on moving his right leg while he is in bed.     Data:   Scheduled Meds:   enoxaparin  40 mg SubCUTAneous Daily    acetaminophen  1,000 mg Oral 3 times per day    gabapentin  100 mg Oral 3 times per day

## 2024-01-12 NOTE — CONSULTS
Consult completed. Nexiva 20g 1.75\" peripheral IV initiated into right forearm x 1 attempt using ultrasound guided technique. Brisk blood return, flushes without resistance. Patient tolerated well. Primary nurse Celine RN notified.     Consult the Vascular Access Team for questions, concerns, or change in patient's condition.

## 2024-01-12 NOTE — PROGRESS NOTES
Physician Progress Note      PATIENT:               ASTER BRICEÑO  CSN #:                  [id number]  :                       1960  ADMIT DATE:       2024 10:11 AM  DISCH DATE:  RESPONDING  PROVIDER #:        Renato Boland MD          QUERY TEXT:    Patient admitted with Back pain 2/2 pathological thoracic fractures. Noted   documentation of UTI per internal medicine progress notes on 24. In order   to support the diagnosis of UTI, please include additional clinical indicators   in your documentation.  Or please document if the diagnosis of UTI has been   ruled out after further study.    The medical record reflects the following:  Risk Factors: Urinary retention;  Clinical Indicators:  24: Urology consult: Gross hematuria likely 2/2 catheter trauma.  Acute   Urinary retention  24: Per internal medicine.  UTI denies any urinary symptoms has hematuria   from traumatic garcia placmement.  continue Rocephin and follow urine cultures  24: urinalysis: small leukocytes; WBC: 37; negative nitrates  24  urine culture: >50,000 mixed skin /urogenital francis no further work up  24: Urinalysis: Trace Leukocytes, negative nitrates. WBC: 8  Treatment: Urology consult; urine culture; Rocephin IV    Thank you,  Jojo Herron BSN, R.N.  Clinical Documentation Improvement  [phone]  Options provided:  -- UTI present as evidenced by, Please document evidence.  -- UTI was ruled out  -- Other - I will add my own diagnosis  -- Disagree - Not applicable / Not valid  -- Disagree - Clinically unable to determine / Unknown  -- Refer to Clinical Documentation Reviewer    PROVIDER RESPONSE TEXT:    UTI was ruled out after study.    Query created by: Jojo Herron on 2024 1:50 PM      Electronically signed by:  Renato Boland MD 2024 11:54 AM

## 2024-01-12 NOTE — PROGRESS NOTES
Physical Therapy    Physical Therapy Treatment Note  Name: Sebastian Perkins MRN: [id number] :   1960   Date:  2024   Admission Date: 2024 Room:  [address] [place] [address]   Restrictions/Precautions:          Spinal Precautions, Hemovac, General Precautions, Fall Risk, DO NOT lay on back per neurosx  Communication with other providers:  per nurse ok to tx and notified pt did not tolerate very much and c/o increased back pain.  Subjective:  Patient states:  pt educated and encouraged to work with therapy. Pt was agreeable to short tx session  Pain:   Location, Type, Intensity (0/10 to 10/10):  pt reports increased pain with change in position of bed  Objective:    Observation:  alert and oriented to self    Treatment, including education/measures:  Bed was slowly/segmentally placed in chair position. Pt only tolerating 3 minutes and then reports needing HOB to be lowered and pt was slowly returned to sup with HOB elevated.  Att to get encourage pt to chatman ex but declined at this time   Safety  Patient left safely in the bed, with call light/phone in reach with alarm applied. Gait belt was used for transfers and gait.   Assessment / Impression:      Patient's tolerance of treatment:  poor   Adverse Reaction: na  Significant change in status and impact:  na  Barriers to improvement:  pt c/o back pain  Plan for Next Session:    Cont. POC  Time in:  1710  Time out:  1725  Timed treatment minutes:  15  Total treatment time:  15    Previously filed items:  Social/Functional History  Type of Home: Facility  Home Equipment: Walker, rolling, Wheelchair-manual  Has the patient had two or more falls in the past year or any fall with injury in the past year?: No  ADL Assistance: Needs assistance  Homemaking Responsibilities: No  Ambulation Assistance: Needs assistance (uses w/c for most mobility at mod I, reports able to walk short distances w/ assistance)  Transfer Assistance: Independent (mod I SPT)        Short Term Goals  Time

## 2024-01-12 NOTE — PROGRESS NOTES
V2.0  [facility] Hospitalist Progress Note      Name:  Sebastian Perkins /Age/Sex: 1960  (63 y.o. male)   MRN & CSN:  [id number] & [id number] Encounter Date/Time: 2024 12:33 PM EST    Location:  -A PCP: No primary care provider on file.       Hospital Day: 7      Subjective:     Chief Complaint:  Back Pain (Low back pain from nursing home. Reports chronic pain but worse today. Reports a hard time stand d/t pain ) and Shoulder Pain (Left shoulder, chronic pain but worse today. 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  CT abdomen/pelvis on admission with lytic lesions in right pubic bone and left iliac wing, several suspicious hepatic lesions and right hilar lymphadenopathy versus perihilar pulmonary nodules.  Also an indeterminate 2.2 cm right adrenal nodule.  There is also a 1.5 cm indeterminate right renal cystic lesion. CT chest showed lung mass, scapula mass and thoracic fractures.  -Oncology onboard-radiation oncology plan radiation therapy for T spine and left scapular mass  -Chemotherapy stimulation 2 weeks from his surgery date (week of )    Back pain likely    Pathological thoracic fractures. CT showed compression injuries at T2 and T7  MRI showed pathological compression fracture T7 due to metastatis, with extention to epidural space.   -Neurosurgery onboard  -continue pain regimen Percocet q4h  -s/p thoracic fusion and epidural tumor decompression  -pathology report shows potential GI/pancreaticobiliary primary  -PT/OT team on board - recommended SNF - patient declines SNF discharge     SIRS 2/4 on admission.   Hypotension likely in the setting of spinal shock  Tachycardia, leukocytosis  wBC 15.9-> 15.0. no sign of infection aside from UTI as below.   Has SOB suspect 2/2 atelectasis. CXR clear on admission  -on rocephin - will complete

## 2024-01-13 LAB
ANION GAP SERPL CALCULATED.3IONS-SCNC: 9 MMOL/L (ref 7–16)
BUN SERPL-MCNC: 12 MG/DL (ref 6–23)
CALCIUM SERPL-MCNC: 8.2 MG/DL (ref 8.3–10.6)
CHLORIDE BLD-SCNC: 99 MMOL/L (ref 99–110)
CO2: 27 MMOL/L (ref 21–32)
CREAT SERPL-MCNC: 0.5 MG/DL (ref 0.9–1.3)
GFR SERPL CREATININE-BSD FRML MDRD: >60 ML/MIN/1.73M2
GLUCOSE SERPL-MCNC: 104 MG/DL (ref 70–99)
HCT VFR BLD CALC: 30.5 % (ref 42–52)
HEMOGLOBIN: 9.2 GM/DL (ref 13.5–18)
MAGNESIUM: 2.2 MG/DL (ref 1.8–2.4)
MCH RBC QN AUTO: 28.3 PG (ref 27–31)
MCHC RBC AUTO-ENTMCNC: 30.2 % (ref 32–36)
MCV RBC AUTO: 93.8 FL (ref 78–100)
PDW BLD-RTO: 15.9 % (ref 11.7–14.9)
PHOSPHORUS: 3.5 MG/DL (ref 2.5–4.9)
PLATELET # BLD: 115 K/CU MM (ref 140–440)
PMV BLD AUTO: 11.4 FL (ref 7.5–11.1)
POTASSIUM SERPL-SCNC: 4.2 MMOL/L (ref 3.5–5.1)
RBC # BLD: 3.25 M/CU MM (ref 4.6–6.2)
SODIUM BLD-SCNC: 135 MMOL/L (ref 135–145)
WBC # BLD: 11.1 K/CU MM (ref 4–10.5)

## 2024-01-13 PROCEDURE — 36415 COLL VENOUS BLD VENIPUNCTURE: CPT

## 2024-01-13 PROCEDURE — 2580000003 HC RX 258: Performed by: STUDENT IN AN ORGANIZED HEALTH CARE EDUCATION/TRAINING PROGRAM

## 2024-01-13 PROCEDURE — 94150 VITAL CAPACITY TEST: CPT

## 2024-01-13 PROCEDURE — 6370000000 HC RX 637 (ALT 250 FOR IP): Performed by: NEUROLOGICAL SURGERY

## 2024-01-13 PROCEDURE — 6370000000 HC RX 637 (ALT 250 FOR IP): Performed by: PHYSICIAN ASSISTANT

## 2024-01-13 PROCEDURE — 2700000000 HC OXYGEN THERAPY PER DAY

## 2024-01-13 PROCEDURE — 6370000000 HC RX 637 (ALT 250 FOR IP): Performed by: STUDENT IN AN ORGANIZED HEALTH CARE EDUCATION/TRAINING PROGRAM

## 2024-01-13 PROCEDURE — 94761 N-INVAS EAR/PLS OXIMETRY MLT: CPT

## 2024-01-13 PROCEDURE — 6370000000 HC RX 637 (ALT 250 FOR IP): Performed by: INTERNAL MEDICINE

## 2024-01-13 PROCEDURE — 94664 DEMO&/EVAL PT USE INHALER: CPT

## 2024-01-13 PROCEDURE — 84100 ASSAY OF PHOSPHORUS: CPT

## 2024-01-13 PROCEDURE — 80048 BASIC METABOLIC PNL TOTAL CA: CPT

## 2024-01-13 PROCEDURE — 2140000000 HC CCU INTERMEDIATE R&B

## 2024-01-13 PROCEDURE — 6360000002 HC RX W HCPCS: Performed by: PHYSICIAN ASSISTANT

## 2024-01-13 PROCEDURE — 2580000003 HC RX 258: Performed by: ANESTHESIOLOGY

## 2024-01-13 PROCEDURE — 83735 ASSAY OF MAGNESIUM: CPT

## 2024-01-13 PROCEDURE — 85027 COMPLETE CBC AUTOMATED: CPT

## 2024-01-13 RX ADMIN — OXYCODONE HYDROCHLORIDE 10 MG: 10 TABLET ORAL at 18:32

## 2024-01-13 RX ADMIN — Medication 10 ML: at 08:14

## 2024-01-13 RX ADMIN — GABAPENTIN 100 MG: 100 CAPSULE ORAL at 14:26

## 2024-01-13 RX ADMIN — OXYCODONE HYDROCHLORIDE 10 MG: 10 TABLET ORAL at 04:13

## 2024-01-13 RX ADMIN — SENNOSIDES, DOCUSATE SODIUM 1 TABLET: 8.6; 5 TABLET ORAL at 08:14

## 2024-01-13 RX ADMIN — GABAPENTIN 100 MG: 100 CAPSULE ORAL at 23:17

## 2024-01-13 RX ADMIN — SODIUM CHLORIDE, PRESERVATIVE FREE 10 ML: 5 INJECTION INTRAVENOUS at 08:15

## 2024-01-13 RX ADMIN — METOPROLOL TARTRATE 25 MG: 25 TABLET, FILM COATED ORAL at 08:14

## 2024-01-13 RX ADMIN — ENOXAPARIN SODIUM 40 MG: 100 INJECTION SUBCUTANEOUS at 08:14

## 2024-01-13 RX ADMIN — TAMSULOSIN HYDROCHLORIDE 0.4 MG: 0.4 CAPSULE ORAL at 08:14

## 2024-01-13 RX ADMIN — Medication 10 ML: at 23:17

## 2024-01-13 RX ADMIN — OXYCODONE HYDROCHLORIDE 10 MG: 10 TABLET ORAL at 23:16

## 2024-01-13 RX ADMIN — METOPROLOL TARTRATE 25 MG: 25 TABLET, FILM COATED ORAL at 23:17

## 2024-01-13 RX ADMIN — ACETAMINOPHEN 1000 MG: 500 TABLET ORAL at 23:17

## 2024-01-13 RX ADMIN — GABAPENTIN 100 MG: 100 CAPSULE ORAL at 05:56

## 2024-01-13 RX ADMIN — ACETAMINOPHEN 1000 MG: 500 TABLET ORAL at 05:56

## 2024-01-13 RX ADMIN — ACETAMINOPHEN 1000 MG: 500 TABLET ORAL at 14:26

## 2024-01-13 RX ADMIN — DIAZEPAM 5 MG: 5 TABLET ORAL at 04:13

## 2024-01-13 ASSESSMENT — PAIN SCALES - GENERAL
PAINLEVEL_OUTOF10: 3
PAINLEVEL_OUTOF10: 0
PAINLEVEL_OUTOF10: 6
PAINLEVEL_OUTOF10: 8
PAINLEVEL_OUTOF10: 4
PAINLEVEL_OUTOF10: 0
PAINLEVEL_OUTOF10: 7
PAINLEVEL_OUTOF10: 5
PAINLEVEL_OUTOF10: 0
PAINLEVEL_OUTOF10: 0

## 2024-01-13 ASSESSMENT — PAIN DESCRIPTION - PAIN TYPE
TYPE: ACUTE PAIN

## 2024-01-13 ASSESSMENT — PAIN DESCRIPTION - ORIENTATION
ORIENTATION: RIGHT
ORIENTATION: RIGHT
ORIENTATION: LEFT
ORIENTATION: RIGHT
ORIENTATION: RIGHT

## 2024-01-13 ASSESSMENT — PAIN DESCRIPTION - FREQUENCY
FREQUENCY: CONTINUOUS
FREQUENCY: INTERMITTENT

## 2024-01-13 ASSESSMENT — PAIN DESCRIPTION - DESCRIPTORS
DESCRIPTORS: ACHING
DESCRIPTORS: DISCOMFORT
DESCRIPTORS: ACHING
DESCRIPTORS: BURNING

## 2024-01-13 ASSESSMENT — PAIN DESCRIPTION - LOCATION
LOCATION: SHOULDER
LOCATION: LEG;ANKLE
LOCATION: SHOULDER

## 2024-01-13 ASSESSMENT — PAIN DESCRIPTION - ONSET: ONSET: ON-GOING

## 2024-01-13 NOTE — PROGRESS NOTES
Patient is a lab collect.  Critical Care panel ordered this am was not drawn.  Lab was called at this time, they will go to [facility] and draw a gold top.

## 2024-01-13 NOTE — CARE COORDINATION
CM reviewed chart and discussed pt in weekend IDR meeting re; pt may be dc on Monday. CM met with pt at bedside to confirm dc plans with pt. Pt asked CM to call his wife, Martine @ [phone]. CM called and shared with Martine that pt is a PD for Monday. CM called and updated Landy @ [employer]. CM will follow for dc needs.     1:47PM- CM received phone call from [employer] stating that pt will need a new precert for return to LTC.

## 2024-01-13 NOTE — PROGRESS NOTES
V2.0  [facility] [place] [facility] Hospitalist Progress Note      Name:  Sebastian Perkins /Age/Sex: 1960  (63 y.o. male)   MRN & CSN:  [id number] & [id number] Encounter Date/Time: 2024 12:33 PM EST    Location:  -A PCP: No primary care provider on file.       Hospital Day: 8      Subjective:     Chief Complaint:  Back Pain (Low back pain from nursing home. Reports chronic pain but worse today. Reports a hard time stand d/t pain ) and Shoulder Pain (Left shoulder, chronic pain but worse today. 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  CT abdomen/pelvis on admission with lytic lesions in right pubic bone and left iliac wing, several suspicious hepatic lesions and right hilar lymphadenopathy versus perihilar pulmonary nodules.  Also an indeterminate 2.2 cm right adrenal nodule.  There is also a 1.5 cm indeterminate right renal cystic lesion. CT chest showed lung mass, scapula mass and thoracic fractures.  -Oncology onboard-radiation oncology plan radiation therapy for T spine and left scapular mass  -Chemotherapy stimulation 2 weeks from his surgery date (week of )  -GI consulted - plan for EGD/colonoscopy Monday/tuesday    Back pain likely    Pathological thoracic fractures. CT showed compression injuries at T2 and T7  MRI showed pathological compression fracture T7 due to metastatis, with extention to epidural space.   -Neurosurgery onboard  -continue pain regimen Percocet q4h  -s/p thoracic fusion and epidural tumor decompression  -pathology report shows potential GI/pancreaticobiliary primary  -PT/OT team on board - recommended SNF     SIRS 2/4 on admission.   Hypotension likely in the setting of spinal shock  Tachycardia, leukocytosis  wBC 15.9-> 15.0. no sign of infection aside from UTI as below.   Has SOB suspect 2/2 atelectasis. CXR clear on admission  -f/u blood cx -  73.  Several subcentimeter cystic lesions in the bilateral kidneys too small to definitively characterize but likely to represent benign cysts.  Suspected post treatment changes in the superior pole of the left kidney.  No hydroureteronephrosis.  Nonobstructing 5 mm calculus in the lower pole of the left kidney. GI/Bowel: Colonic diverticulosis without evidence of acute diverticulitis. Normal appendix.  The stomach and small bowel are normal in appearance.  No obstruction or wall thickening identified. Pelvis: The urinary bladder is moderately distended with multiple diverticula.  No abnormal wall thickening identified.  Layering calculi within the urinary bladder lumen are seen.  Moderate prostatomegaly.  No free fluid in the pelvis.  No pelvic or inguinal lymphadenopathy. Peritoneum/Retroperitoneum: The abdominal aorta is normal in caliber with moderate atherosclerosis.  No acute vascular abnormality identified.  No retroperitoneal or mesenteric lymphadenopathy is identified.  No free air or fluid is seen in the abdomen. Bones/Soft Tissues: Lytic destructive lesion within the right pubic bone with soft tissue extension into the adjacent musculature.  This measures approximately 2.7 cm on axial image 179.  1.1 cm lytic lesion in the left iliac wing on axial image 132.  No acute osseous or soft tissue abnormality.     1. Metastatic disease of uncertain origin with lytic lesions in the right pubic bone and left iliac wing, several suspicious hepatic lesions, and right hilar lymphadenopathy versus perihilar pulmonary nodules.  Recommend further evaluation with a CT of the chest. 2. 2.2 cm indeterminate right adrenal nodule.  Consider further evaluation with adrenal protocol MRI or CT. 3. Moderate prostatomegaly with moderate urinary bladder distension. Layering calculi within the urinary bladder lumen. 4. 1.5 cm indeterminate right renal cystic lesion.  Consider further evaluation with a renal protocol MRI or CT.

## 2024-01-13 NOTE — PLAN OF CARE
Problem: Discharge Planning  Goal: Discharge to home or other facility with appropriate resources  1/12/2024 2352 by Mily Preciado RN  Outcome: Progressing  1/12/2024 2352 by Mily Preciado RN  Outcome: Progressing  1/12/2024 1128 by Dai Morrison RN  Outcome: Progressing     Problem: Pain  Goal: Verbalizes/displays adequate comfort level or baseline comfort level  1/12/2024 2352 by Mily Preciado RN  Outcome: Progressing  1/12/2024 1128 by Dai Morrison RN  Outcome: Progressing     Problem: Safety - Adult  Goal: Free from fall injury  1/12/2024 2352 by Mily Preciado RN  Outcome: Progressing  1/12/2024 1128 by Dai Morrison RN  Outcome: Progressing     Problem: Skin/Tissue Integrity  Goal: Absence of new skin breakdown  Description: 1.  Monitor for areas of redness and/or skin breakdown  2.  Assess vascular access sites hourly  3.  Every 4-6 hours minimum:  Change oxygen saturation probe site  4.  Every 4-6 hours:  If on nasal continuous positive airway pressure, respiratory therapy assess nares and determine need for appliance change or resting period.  1/12/2024 2352 by Mily Preciado RN  Outcome: Progressing  1/12/2024 1128 by Dai Morrison RN  Outcome: Progressing     Problem: ABCDS Injury Assessment  Goal: Absence of physical injury  1/12/2024 2352 by Mily Preciado RN  Outcome: Progressing  1/12/2024 1128 by Dai Morrison RN  Outcome: Progressing     Problem: Chronic Conditions and Co-morbidities  Goal: Patient's chronic conditions and co-morbidity symptoms are monitored and maintained or improved  1/12/2024 2352 by Mily Preciado RN  Outcome: Progressing  1/12/2024 1128 by Dai Morrison RN  Outcome: Progressing     Problem: Nutrition Deficit:  Goal: Optimize nutritional status  1/12/2024 2352 by Mily Preciado RN  Outcome: Progressing  1/12/2024 1128 by Dai Morrison RN  Outcome: Progressing

## 2024-01-13 NOTE — PROGRESS NOTES
4 Eyes Skin Assessment     NAME:  Sebastian Perkins  YOB: 1960  MEDICAL RECORD NUMBER:  [id number]    The patient is being assessed for  Transfer to New Unit    I agree that at least one RN has performed a thorough Head to Toe Skin Assessment on the patient. ALL assessment sites listed below have been assessed.      Areas assessed by both nurses:    Head, Face, Ears, Shoulders, Back, Chest, Arms, Elbows, Hands, Sacrum. Buttock, Coccyx, Ischium, and Legs. Feet and Heels        Does the Patient have a Wound? Yes wound(s) were present on assessment. LDA wound assessment was Initiated and completed by RN       Domenico Prevention initiated by RN: No  Wound Care Orders initiated by RN: No    Pressure Injury (Stage 3,4, Unstageable, DTI, NWPT, and Complex wounds) if present, place Wound referral order by RN under : No    New Ostomies, if present place, Ostomy referral order under : No     Nurse 1 eSignature: Electronically signed by Bruna Rivera RN on 1/13/24 at 3:48 PM EST    **SHARE this note so that the co-signing nurse can place an eSignature**    Nurse 2 eSignature: Electronically signed by Charline Marks RN on 1/13/24 at 7:44 PM EST

## 2024-01-13 NOTE — PROGRESS NOTES
Patient was just transferred from ICU, during this nurses assessment patient is unable to move right and left leg/feet and states they are numb.  According to Laura Anderson NP progress not 1- he could wiggle toes on right foot.  Laura Anderson NP perfect served.

## 2024-01-13 NOTE — CONSULTS
[facility]  Gastroenterology Consultation    2024  9:32 AM  _________________________________________________________________________  Patient:    Sebastian Perkins  : 1960   63 y.o.             MRN: [id number]  Admitted: 2024 10:11 AM ATT: Aniya Valencia MD   8/8-A  AdmitDx: Weakness [R53.1]  Septicemia (HCC) [A41.9]  Acute cystitis with hematuria [N30.01]  Metastatic malignant neoplasm, unspecified site (HCC) [C79.9] PCP: No primary care provider on file.  _________________________________________________________________________    Reason for Consult:   Metastatic Adenocarcinoma of unknown origin - concern for GI primary  Requesting Physician:  Aniya Valencia MD  // Magnolia Bernabe PA-C     _________________________________________________________________________  IMPRESSION and RECOMMENDATIONS:    The patient is a 63 y.o. male with hx per HPI. GI consulted for Metastatic Adenocarcinoma of unknown origin - concern for GI primary.     Metastatic Adenocarcinoma of unknown origin - concern for GI primary  Thoracic vertebral fracture s/p repair  Multiple liver lesions   - AFP, CA 19-9 normal.    - CEA elevated.   T7 paraspinal tumor path consistent with metastatic carcinoma  -Path raises possibility of upper GI/pancreaticobiliary primary.  Lower GI in differential as well.  Patient never had a colonoscopy.    Discussion:  Discussed with patient about path findings and evaluating with an EGD and a colonoscopy.  He says that he would like to undergo procedures while he is in the hospital.  He is too weak to do it as an outpatient which I agree.  Also is limited with ambulation given poor mobility and lower extremity weakness.    CEA elevated    Plan:  - Patient had solid food today.  Will most likely plan for clear liquid diet either tomorrow or Monday and plan for EGD and colonoscopy on Monday or Tuesday based on the Endo schedule.    - Okay to continue  acetaminophen    Allergies: No Known Allergies    Allergies:  Patient has no known allergies.  _________________________________________________________________________  Social History:   TOBACCO:   reports that he has been smoking cigarettes. He has never used smokeless tobacco.  ETOH:   reports that he does not currently use alcohol.    Family History:   History reviewed. No pertinent family history.    No family history of colon cancer, Crohn's disease, or ulcerative colitis.    _________________________________________________________________________  DATA // Labs    ABGs: No results found for: \"PHART\", \"PO2ART\", \"NSU0JDM\"  CBC:   Recent Labs     01/11/24  0350   WBC 13.8*   HGB 7.5*        BMP:    Recent Labs     01/11/24  0350      K 4.2      CO2 25   BUN 10   CREATININE 0.4*   GLUCOSE 118*     Magnesium:   Lab Results   Component Value Date/Time    MG 2.1 01/06/2024 11:43 AM     Hepatic: No results for input(s): \"AST\", \"ALT\", \"ALB\", \"BILITOT\", \"ALKPHOS\" in the last 72 hours.  No results for input(s): \"LIPASE\", \"AMYLASE\" in the last 72 hours.  No results for input(s): \"PROTIME\", \"INR\" in the last 72 hours.  No results for input(s): \"PTT\" in the last 72 hours.  Lipids: No results for input(s): \"CHOL\", \"HDL\" in the last 72 hours.    Invalid input(s): \"LDLCALCU\"  INR: No results for input(s): \"INR\" in the last 72 hours.  TSH: No results found for: \"TSH\"    Intake/Output Summary (Last 24 hours) at 1/13/2024 0932  Last data filed at 1/13/2024 0600  Gross per 24 hour   Intake 1886.24 ml   Output 1790 ml   Net 96.24 ml      sodium chloride      sodium chloride         _________________________________________________________________________    Physical Exam:    Vitals:  /76   Pulse 83   Temp 97.6 °F (36.4 °C) (Oral)   Resp 16   Ht 1.626 m (5' 4\")   Wt 74.3 kg (163 lb 12.8 oz)   SpO2 98%   BMI 28.10 kg/m²     Gen: NAD, conversant, resting comfortably.    Eyes: EOMI. Anicteric sclerae,

## 2024-01-13 NOTE — PROGRESS NOTES
Received patient from ICU, transferred to [address] [place] [address], skin assessment completed by DEMETRIUS Rivera RN and KILO Marks RN.

## 2024-01-14 LAB
ANION GAP SERPL CALCULATED.3IONS-SCNC: 9 MMOL/L (ref 7–16)
BUN SERPL-MCNC: 12 MG/DL (ref 6–23)
CALCIUM SERPL-MCNC: 8 MG/DL (ref 8.3–10.6)
CHLORIDE BLD-SCNC: 101 MMOL/L (ref 99–110)
CO2: 26 MMOL/L (ref 21–32)
CREAT SERPL-MCNC: 0.5 MG/DL (ref 0.9–1.3)
GFR SERPL CREATININE-BSD FRML MDRD: >60 ML/MIN/1.73M2
GLUCOSE SERPL-MCNC: 99 MG/DL (ref 70–99)
HCT VFR BLD CALC: 28.6 % (ref 42–52)
HEMOGLOBIN: 8.5 GM/DL (ref 13.5–18)
MAGNESIUM: 2.2 MG/DL (ref 1.8–2.4)
MCH RBC QN AUTO: 27.3 PG (ref 27–31)
MCHC RBC AUTO-ENTMCNC: 29.7 % (ref 32–36)
MCV RBC AUTO: 92 FL (ref 78–100)
PDW BLD-RTO: 15.9 % (ref 11.7–14.9)
PHOSPHORUS: 4.1 MG/DL (ref 2.5–4.9)
PLATELET # BLD: 416 K/CU MM (ref 140–440)
PMV BLD AUTO: 10 FL (ref 7.5–11.1)
POTASSIUM SERPL-SCNC: 3.9 MMOL/L (ref 3.5–5.1)
RBC # BLD: 3.11 M/CU MM (ref 4.6–6.2)
SODIUM BLD-SCNC: 136 MMOL/L (ref 135–145)
WBC # BLD: 13 K/CU MM (ref 4–10.5)

## 2024-01-14 PROCEDURE — 80048 BASIC METABOLIC PNL TOTAL CA: CPT

## 2024-01-14 PROCEDURE — 36415 COLL VENOUS BLD VENIPUNCTURE: CPT

## 2024-01-14 PROCEDURE — 84100 ASSAY OF PHOSPHORUS: CPT

## 2024-01-14 PROCEDURE — 83735 ASSAY OF MAGNESIUM: CPT

## 2024-01-14 PROCEDURE — 85027 COMPLETE CBC AUTOMATED: CPT

## 2024-01-14 PROCEDURE — 2140000000 HC CCU INTERMEDIATE R&B

## 2024-01-14 PROCEDURE — 6370000000 HC RX 637 (ALT 250 FOR IP): Performed by: NEUROLOGICAL SURGERY

## 2024-01-14 PROCEDURE — 97530 THERAPEUTIC ACTIVITIES: CPT

## 2024-01-14 PROCEDURE — 6360000002 HC RX W HCPCS: Performed by: PHYSICIAN ASSISTANT

## 2024-01-14 PROCEDURE — 2580000003 HC RX 258: Performed by: ANESTHESIOLOGY

## 2024-01-14 PROCEDURE — 6370000000 HC RX 637 (ALT 250 FOR IP): Performed by: PHYSICIAN ASSISTANT

## 2024-01-14 PROCEDURE — 6370000000 HC RX 637 (ALT 250 FOR IP): Performed by: STUDENT IN AN ORGANIZED HEALTH CARE EDUCATION/TRAINING PROGRAM

## 2024-01-14 PROCEDURE — 94761 N-INVAS EAR/PLS OXIMETRY MLT: CPT

## 2024-01-14 PROCEDURE — 6370000000 HC RX 637 (ALT 250 FOR IP): Performed by: INTERNAL MEDICINE

## 2024-01-14 RX ADMIN — ACETAMINOPHEN 1000 MG: 500 TABLET ORAL at 15:02

## 2024-01-14 RX ADMIN — GABAPENTIN 100 MG: 100 CAPSULE ORAL at 15:02

## 2024-01-14 RX ADMIN — ACETAMINOPHEN 1000 MG: 500 TABLET ORAL at 20:49

## 2024-01-14 RX ADMIN — Medication 10 ML: at 20:52

## 2024-01-14 RX ADMIN — METOPROLOL TARTRATE 25 MG: 25 TABLET, FILM COATED ORAL at 20:49

## 2024-01-14 RX ADMIN — SENNOSIDES, DOCUSATE SODIUM 1 TABLET: 8.6; 5 TABLET ORAL at 09:29

## 2024-01-14 RX ADMIN — GABAPENTIN 100 MG: 100 CAPSULE ORAL at 20:49

## 2024-01-14 RX ADMIN — Medication 10 ML: at 09:28

## 2024-01-14 RX ADMIN — OXYCODONE HYDROCHLORIDE 10 MG: 10 TABLET ORAL at 04:44

## 2024-01-14 RX ADMIN — DIAZEPAM 5 MG: 5 TABLET ORAL at 09:38

## 2024-01-14 RX ADMIN — METOPROLOL TARTRATE 25 MG: 25 TABLET, FILM COATED ORAL at 09:29

## 2024-01-14 RX ADMIN — SENNOSIDES, DOCUSATE SODIUM 1 TABLET: 8.6; 5 TABLET ORAL at 20:49

## 2024-01-14 RX ADMIN — GABAPENTIN 100 MG: 100 CAPSULE ORAL at 07:20

## 2024-01-14 RX ADMIN — OXYCODONE HYDROCHLORIDE 10 MG: 10 TABLET ORAL at 15:11

## 2024-01-14 RX ADMIN — ACETAMINOPHEN 1000 MG: 500 TABLET ORAL at 07:20

## 2024-01-14 RX ADMIN — ENOXAPARIN SODIUM 40 MG: 100 INJECTION SUBCUTANEOUS at 09:29

## 2024-01-14 RX ADMIN — OXYCODONE HYDROCHLORIDE 10 MG: 10 TABLET ORAL at 20:49

## 2024-01-14 RX ADMIN — TAMSULOSIN HYDROCHLORIDE 0.4 MG: 0.4 CAPSULE ORAL at 09:29

## 2024-01-14 ASSESSMENT — PAIN DESCRIPTION - FREQUENCY
FREQUENCY: CONTINUOUS

## 2024-01-14 ASSESSMENT — PAIN DESCRIPTION - LOCATION
LOCATION: BACK;GENERALIZED
LOCATION: BACK;GENERALIZED
LOCATION: BACK
LOCATION: SHOULDER
LOCATION: BACK;SHOULDER
LOCATION: ABDOMEN
LOCATION: WRIST
LOCATION: BACK;SHOULDER

## 2024-01-14 ASSESSMENT — PAIN DESCRIPTION - DESCRIPTORS
DESCRIPTORS: ACHING
DESCRIPTORS: ACHING;BURNING
DESCRIPTORS: SHARP
DESCRIPTORS: SHARP
DESCRIPTORS: ACHING
DESCRIPTORS: SHARP
DESCRIPTORS: BURNING;ACHING
DESCRIPTORS: BURNING

## 2024-01-14 ASSESSMENT — PAIN DESCRIPTION - ORIENTATION
ORIENTATION: RIGHT;UPPER
ORIENTATION: RIGHT;UPPER
ORIENTATION: LEFT
ORIENTATION: LOWER
ORIENTATION: MID;UPPER
ORIENTATION: RIGHT

## 2024-01-14 ASSESSMENT — PAIN - FUNCTIONAL ASSESSMENT
PAIN_FUNCTIONAL_ASSESSMENT: ACTIVITIES ARE NOT PREVENTED
PAIN_FUNCTIONAL_ASSESSMENT: ACTIVITIES ARE NOT PREVENTED
PAIN_FUNCTIONAL_ASSESSMENT: PREVENTS OR INTERFERES SOME ACTIVE ACTIVITIES AND ADLS

## 2024-01-14 ASSESSMENT — PAIN DESCRIPTION - ONSET
ONSET: ON-GOING

## 2024-01-14 ASSESSMENT — PAIN DESCRIPTION - PAIN TYPE
TYPE: CHRONIC PAIN
TYPE: ACUTE PAIN
TYPE: CHRONIC PAIN
TYPE: ACUTE PAIN

## 2024-01-14 ASSESSMENT — PAIN SCALES - GENERAL
PAINLEVEL_OUTOF10: 3
PAINLEVEL_OUTOF10: 4
PAINLEVEL_OUTOF10: 6
PAINLEVEL_OUTOF10: 7
PAINLEVEL_OUTOF10: 3
PAINLEVEL_OUTOF10: 3
PAINLEVEL_OUTOF10: 8
PAINLEVEL_OUTOF10: 8
PAINLEVEL_OUTOF10: 0

## 2024-01-14 NOTE — PROGRESS NOTES
calculus in the lower pole of the left kidney. GI/Bowel: Colonic diverticulosis without evidence of acute diverticulitis. Normal appendix.  The stomach and small bowel are normal in appearance.  No obstruction or wall thickening identified. Pelvis: The urinary bladder is moderately distended with multiple diverticula.  No abnormal wall thickening identified.  Layering calculi within the urinary bladder lumen are seen.  Moderate prostatomegaly.  No free fluid in the pelvis.  No pelvic or inguinal lymphadenopathy. Peritoneum/Retroperitoneum: The abdominal aorta is normal in caliber with moderate atherosclerosis.  No acute vascular abnormality identified.  No retroperitoneal or mesenteric lymphadenopathy is identified.  No free air or fluid is seen in the abdomen. Bones/Soft Tissues: Lytic destructive lesion within the right pubic bone with soft tissue extension into the adjacent musculature.  This measures approximately 2.7 cm on axial image 179.  1.1 cm lytic lesion in the left iliac wing on axial image 132.  No acute osseous or soft tissue abnormality.     1. Metastatic disease of uncertain origin with lytic lesions in the right pubic bone and left iliac wing, several suspicious hepatic lesions, and right hilar lymphadenopathy versus perihilar pulmonary nodules.  Recommend further evaluation with a CT of the chest. 2. 2.2 cm indeterminate right adrenal nodule.  Consider further evaluation with adrenal protocol MRI or CT. 3. Moderate prostatomegaly with moderate urinary bladder distension. Layering calculi within the urinary bladder lumen. 4. 1.5 cm indeterminate right renal cystic lesion.  Consider further evaluation with a renal protocol MRI or CT.  Suspected post treatment changes in the superior pole of the left kidney.  Recommend correlation with previous intervention. RECOMMENDATIONS: 2.2 cm right adrenal mass, probable benign adenoma. Recommend adrenal washout CT or chemical shift MRI. JACR 2017 Aug;

## 2024-01-14 NOTE — PROGRESS NOTES
Physical Therapy    Physical Therapy Treatment Note  Name: Sebastian Perkins MRN: [id number] :   1960   Date:  2024   Admission Date: 2024 Room:  [address] [place] [address]   Restrictions/Precautions:          Spinal Precautions, Hemovac, General Precautions, Fall Risk, DO NOT lay on back per neurosx   Communication with other providers:  per nurse ok to tx and pt medicated at 0700 hrs  Subjective:  Patient states:  pt needing education and encouragement to participate in tx. Pt states \" I'm going back to the nursing home tomorrow\"  Pain:   Location, Type, Intensity (0/10 to 10/10):  pt did c/o pain in backwith transfers sup to sit but did not rate and did not appear to be in distress once sitting EOB  Objective:    Observation:  alert and oriented     Treatment, including education/measures:  Sup<=>side<=>sit max assist and cues moving to right side with bed rail and HOB up.  Pt was able to sit EOB with cga x 3 minutes but then c/o fatigue. While sitting EOB att to have pt move legs but unable. Once pt returned to sup he was able to move left leg 2 but very weak.  Pt was able to assist with scooting to HOB using right UE with bed rail but needing max assist.  At end of tx pillow placed on left side at hip and shoulder.  Safety  Patient left safely in the bed, with call light/phone in reach with alarm applied. Gait belt was used for transfers and gait.   Assessment / Impression:      Patient's tolerance of treatment:  fair   Adverse Reaction: na  Significant change in status and impact:  na  Barriers to improvement:  pain strength and safety  Plan for Next Session:    Cont. POC  Time in:  0900  Time out:  0915  Timed treatment minutes:  15  Total treatment time:  15    Previously filed items:  Social/Functional History  Type of Home: Facility  Home Equipment: Walker, rolling, Wheelchair-manual  Has the patient had two or more falls in the past year or any fall with injury in the past year?: No  ADL Assistance: Needs

## 2024-01-14 NOTE — PROGRESS NOTES
I have seen and examined this patient personally, and independently of Guerita Maldonado PA-C. The plan was developed mutually at the time of the visit with the patient. Guerita and I have spoken with patient/family, nursing staff and provided written and verbal instructions. The above note has been reviewed and I agree with the Assessment,  Diagnosis, and Treatment plan as suggested by Guerita Maldonado. I have also suggested more changes to the therapy plan , which is being implemented.    Pt is doing well. No new complaints.     A/P:  Metastatic cancer with GI primary thought.  Multiple liver lesions  T7 paraspinal tumor with path consistent metastatic carcinoma    Plan:  - Continue supportive care.  - Prep for colonoscopy.  - Plan for EGD and colonoscopy on the .    Per family there is strong family history of colorectal cancer but patient has not see any evaluation for this in the past.    Hazel Zhou MD  [facility]    [facility]    [facility]  Progress Note  2024  4:13 PM  ___________________________________________________________________________  Patient:    Sebastian Perkins  : 1960   63 y.o.             MRN: [id number]  Admitted: 2024 10:11 AM ATT: Aniya Valencia MD   3110/3110-A  AdmitDx: Weakness [R53.1]  Septicemia (HCC) [A41.9]  Acute cystitis with hematuria [N30.01]  Metastatic malignant neoplasm, unspecified site (HCC) [C79.9]  PCP: No primary care provider on file.  ___________________________________________________________________________  ASSESSMENT AND PLAN :    Impression:  Metastatic Adenocarcinoma of unknown origin - concern for GI primary  Thoracic vertebral fracture s/p repair  Multiple liver lesions               - AFP, CA 19-9 normal               - CEA elevated  T7 paraspinal tumor path consistent with metastatic carcinoma  - Path raises possibility of GI/pancreaticobiliary primary  - No previous colonoscopy despite strong family history of CRC      Discussion:  Discussed with patient about path findings and evaluating with an EGD and a colonoscopy.  He says that he would like to undergo procedures while he is in the hospital.  He is too weak to do it as an outpatient which I agree.  Also is limited with ambulation given poor mobility and lower extremity weakness.     1/14/2024 - Patient with flat affect, minimally conversant. Noted spastic paralysis of ortiz LE. Sister at bedside reports strong family history of CRC (herself included), patient reports no previous endoscopies.      Plan:  - Continue supportive care    - Pain control per primary  - Okay to continue regular diet for now  - Posted for EGD + colonoscopy on Tuesday, 1/16/24 (bowel prep tomorrow evening, CLD tomorrow and then NPO at midnight)      Patient clinical, biochemical, and radiological information discussed with Dr. Zhou. He agrees with the assessment and plan above.  ___________________________________________________________________________    SUBJECTIVE:    Patient with flat affect, minimally conversant. His sister is at bedside, she reports strong family history of colon cancer, she herself has history of colon cancer and she mentioned several additional relatives. Patient reports never having a colonoscopy in the past, does not respond when asked why.     Chart reviewed, events noted                                                                                                           ROS: 14 point ROS is negative except subjective.  _________________________________________________________________________    Endoscopy: None in the past   _________________________________________________________________________    Radiology:   CT A/P 1/6/2024  IMPRESSION:  1. Metastatic disease of uncertain origin with lytic lesions in the right pubic bone and left iliac wing, several suspicious hepatic lesions, and right hilar lymphadenopathy versus perihilar pulmonary nodules.  Recommend further

## 2024-01-14 NOTE — PLAN OF CARE
Problem: Discharge Planning  Goal: Discharge to home or other facility with appropriate resources  1/14/2024 0955 by Anya Joseph RN  Outcome: Progressing  1/14/2024 0316 by Mike Rubio RN  Outcome: Progressing     Problem: Pain  Goal: Verbalizes/displays adequate comfort level or baseline comfort level  1/14/2024 0955 by Anya Joseph RN  Outcome: Progressing  1/14/2024 0316 by Mike Rubio RN  Outcome: Progressing     Problem: Safety - Adult  Goal: Free from fall injury  1/14/2024 0955 by Anya Joseph RN  Outcome: Progressing  1/14/2024 0316 by Mike Rubio RN  Outcome: Progressing     Problem: Skin/Tissue Integrity  Goal: Absence of new skin breakdown  Description: 1.  Monitor for areas of redness and/or skin breakdown  2.  Assess vascular access sites hourly  3.  Every 4-6 hours minimum:  Change oxygen saturation probe site  4.  Every 4-6 hours:  If on nasal continuous positive airway pressure, respiratory therapy assess nares and determine need for appliance change or resting period.  1/14/2024 0955 by Anya Joseph RN  Outcome: Progressing  1/14/2024 0316 by Mike Rubio RN  Outcome: Progressing     Problem: ABCDS Injury Assessment  Goal: Absence of physical injury  1/14/2024 0955 by Anya Joseph RN  Outcome: Progressing  1/14/2024 0316 by Mike Rubio RN  Outcome: Progressing     Problem: Chronic Conditions and Co-morbidities  Goal: Patient's chronic conditions and co-morbidity symptoms are monitored and maintained or improved  1/14/2024 0955 by Anya Joseph RN  Outcome: Progressing  1/14/2024 0316 by Mike Rubio RN  Outcome: Progressing     Problem: Nutrition Deficit:  Goal: Optimize nutritional status  1/14/2024 0955 by Anya Joseph RN  Outcome: Progressing  1/14/2024 0316 by Mike Rubio RN  Outcome: Progressing

## 2024-01-14 NOTE — PROGRESS NOTES
I was notified by ANNELIESE Sanchez that patient no longer moving RLE.  I immediately went and saw patient.  There is indeed no movement in RLE.  Absent SLT BLE.    High suspicion for spinal cord compression.  Patient's overall prognosis is poor.  Do no think another MRI or further surgery will be in his best interest.    I am about to start another surgery now.  Will call his sister/POA when done and discuss above.        Nallely Ragland MD, JAYDON, FAANS  Board Certified Neurosurgeon  Minimally Invasive Spine Surgeon  President of [employer].      Phone: [phone] ([phone])  Fax: [phone]  Website: [email or web address]

## 2024-01-15 ENCOUNTER — ANESTHESIA EVENT (OUTPATIENT)
Dept: ENDOSCOPY | Age: 64
End: 2024-01-15
Payer: COMMERCIAL

## 2024-01-15 LAB
ANION GAP SERPL CALCULATED.3IONS-SCNC: 10 MMOL/L (ref 7–16)
BUN SERPL-MCNC: 14 MG/DL (ref 6–23)
CALCIUM SERPL-MCNC: 7.9 MG/DL (ref 8.3–10.6)
CHLORIDE BLD-SCNC: 101 MMOL/L (ref 99–110)
CO2: 25 MMOL/L (ref 21–32)
CREAT SERPL-MCNC: 0.5 MG/DL (ref 0.9–1.3)
GFR SERPL CREATININE-BSD FRML MDRD: >60 ML/MIN/1.73M2
GLUCOSE SERPL-MCNC: 100 MG/DL (ref 70–99)
MAGNESIUM: 2.3 MG/DL (ref 1.8–2.4)
PHOSPHORUS: 4.5 MG/DL (ref 2.5–4.9)
POTASSIUM SERPL-SCNC: 4 MMOL/L (ref 3.5–5.1)
SODIUM BLD-SCNC: 136 MMOL/L (ref 135–145)

## 2024-01-15 PROCEDURE — 6370000000 HC RX 637 (ALT 250 FOR IP): Performed by: INTERNAL MEDICINE

## 2024-01-15 PROCEDURE — 6370000000 HC RX 637 (ALT 250 FOR IP): Performed by: STUDENT IN AN ORGANIZED HEALTH CARE EDUCATION/TRAINING PROGRAM

## 2024-01-15 PROCEDURE — 2580000003 HC RX 258: Performed by: INTERNAL MEDICINE

## 2024-01-15 PROCEDURE — 93005 ELECTROCARDIOGRAM TRACING: CPT | Performed by: INTERNAL MEDICINE

## 2024-01-15 PROCEDURE — 6370000000 HC RX 637 (ALT 250 FOR IP): Performed by: PHYSICIAN ASSISTANT

## 2024-01-15 PROCEDURE — 2140000000 HC CCU INTERMEDIATE R&B

## 2024-01-15 PROCEDURE — 36415 COLL VENOUS BLD VENIPUNCTURE: CPT

## 2024-01-15 PROCEDURE — 80048 BASIC METABOLIC PNL TOTAL CA: CPT

## 2024-01-15 PROCEDURE — 83735 ASSAY OF MAGNESIUM: CPT

## 2024-01-15 PROCEDURE — 99232 SBSQ HOSP IP/OBS MODERATE 35: CPT | Performed by: INTERNAL MEDICINE

## 2024-01-15 PROCEDURE — 6360000002 HC RX W HCPCS: Performed by: PHYSICIAN ASSISTANT

## 2024-01-15 PROCEDURE — 6370000000 HC RX 637 (ALT 250 FOR IP): Performed by: NEUROLOGICAL SURGERY

## 2024-01-15 PROCEDURE — 2580000003 HC RX 258: Performed by: ANESTHESIOLOGY

## 2024-01-15 PROCEDURE — 97530 THERAPEUTIC ACTIVITIES: CPT

## 2024-01-15 PROCEDURE — 94761 N-INVAS EAR/PLS OXIMETRY MLT: CPT

## 2024-01-15 PROCEDURE — 6360000002 HC RX W HCPCS: Performed by: INTERNAL MEDICINE

## 2024-01-15 PROCEDURE — 84100 ASSAY OF PHOSPHORUS: CPT

## 2024-01-15 PROCEDURE — 2580000003 HC RX 258: Performed by: STUDENT IN AN ORGANIZED HEALTH CARE EDUCATION/TRAINING PROGRAM

## 2024-01-15 RX ORDER — METOPROLOL TARTRATE 50 MG/1
50 TABLET, FILM COATED ORAL 2 TIMES DAILY
Status: DISCONTINUED | OUTPATIENT
Start: 2024-01-15 | End: 2024-01-19 | Stop reason: HOSPADM

## 2024-01-15 RX ORDER — METOCLOPRAMIDE HYDROCHLORIDE 5 MG/ML
10 INJECTION INTRAMUSCULAR; INTRAVENOUS ONCE
Status: COMPLETED | OUTPATIENT
Start: 2024-01-15 | End: 2024-01-15

## 2024-01-15 RX ORDER — 0.9 % SODIUM CHLORIDE 0.9 %
1000 INTRAVENOUS SOLUTION INTRAVENOUS ONCE
Status: COMPLETED | OUTPATIENT
Start: 2024-01-15 | End: 2024-01-15

## 2024-01-15 RX ADMIN — DIAZEPAM 5 MG: 5 TABLET ORAL at 05:39

## 2024-01-15 RX ADMIN — SENNOSIDES, DOCUSATE SODIUM 1 TABLET: 8.6; 5 TABLET ORAL at 20:34

## 2024-01-15 RX ADMIN — METOPROLOL TARTRATE 25 MG: 25 TABLET, FILM COATED ORAL at 08:21

## 2024-01-15 RX ADMIN — ACETAMINOPHEN 1000 MG: 500 TABLET ORAL at 05:39

## 2024-01-15 RX ADMIN — SODIUM CHLORIDE 1000 ML: 9 INJECTION, SOLUTION INTRAVENOUS at 20:41

## 2024-01-15 RX ADMIN — ACETAMINOPHEN 1000 MG: 500 TABLET ORAL at 13:23

## 2024-01-15 RX ADMIN — GABAPENTIN 100 MG: 100 CAPSULE ORAL at 20:34

## 2024-01-15 RX ADMIN — ENOXAPARIN SODIUM 40 MG: 100 INJECTION SUBCUTANEOUS at 08:21

## 2024-01-15 RX ADMIN — GABAPENTIN 100 MG: 100 CAPSULE ORAL at 05:39

## 2024-01-15 RX ADMIN — METOCLOPRAMIDE 10 MG: 5 INJECTION, SOLUTION INTRAMUSCULAR; INTRAVENOUS at 20:34

## 2024-01-15 RX ADMIN — OXYCODONE HYDROCHLORIDE 5 MG: 5 TABLET ORAL at 20:33

## 2024-01-15 RX ADMIN — Medication 10 ML: at 08:22

## 2024-01-15 RX ADMIN — SODIUM CHLORIDE, PRESERVATIVE FREE 10 ML: 5 INJECTION INTRAVENOUS at 20:37

## 2024-01-15 RX ADMIN — METOPROLOL TARTRATE 50 MG: 50 TABLET, FILM COATED ORAL at 20:34

## 2024-01-15 RX ADMIN — GABAPENTIN 100 MG: 100 CAPSULE ORAL at 13:23

## 2024-01-15 RX ADMIN — POLYETHYLENE GLYCOL 3350, SODIUM SULFATE ANHYDROUS, SODIUM BICARBONATE, SODIUM CHLORIDE, POTASSIUM CHLORIDE 4000 ML: 236; 22.74; 6.74; 5.86; 2.97 POWDER, FOR SOLUTION ORAL at 13:12

## 2024-01-15 RX ADMIN — ACETAMINOPHEN 1000 MG: 500 TABLET ORAL at 20:33

## 2024-01-15 RX ADMIN — OXYCODONE HYDROCHLORIDE 5 MG: 5 TABLET ORAL at 04:40

## 2024-01-15 RX ADMIN — TAMSULOSIN HYDROCHLORIDE 0.4 MG: 0.4 CAPSULE ORAL at 08:21

## 2024-01-15 ASSESSMENT — PAIN DESCRIPTION - LOCATION
LOCATION: BACK
LOCATION: BACK

## 2024-01-15 ASSESSMENT — PAIN SCALES - WONG BAKER
WONGBAKER_NUMERICALRESPONSE: 4
WONGBAKER_NUMERICALRESPONSE: 0

## 2024-01-15 ASSESSMENT — PAIN DESCRIPTION - DIRECTION: RADIATING_TOWARDS: BACK

## 2024-01-15 ASSESSMENT — PAIN DESCRIPTION - ONSET: ONSET: GRADUAL

## 2024-01-15 ASSESSMENT — PAIN SCALES - GENERAL
PAINLEVEL_OUTOF10: 0
PAINLEVEL_OUTOF10: 5
PAINLEVEL_OUTOF10: 5
PAINLEVEL_OUTOF10: 6

## 2024-01-15 ASSESSMENT — PAIN DESCRIPTION - DESCRIPTORS: DESCRIPTORS: CRAMPING

## 2024-01-15 ASSESSMENT — LIFESTYLE VARIABLES: SMOKING_STATUS: 1

## 2024-01-15 ASSESSMENT — PAIN DESCRIPTION - FREQUENCY: FREQUENCY: INTERMITTENT

## 2024-01-15 ASSESSMENT — PAIN DESCRIPTION - PAIN TYPE: TYPE: CHRONIC PAIN;ACUTE PAIN;SURGICAL PAIN

## 2024-01-15 NOTE — PLAN OF CARE
Problem: Discharge Planning  Goal: Discharge to home or other facility with appropriate resources  1/14/2024 2204 by Patricia Almanza RN  Outcome: Progressing  1/14/2024 0955 by Anya Joseph RN  Outcome: Progressing     Problem: Pain  Goal: Verbalizes/displays adequate comfort level or baseline comfort level  1/14/2024 2204 by Patricia Almanza RN  Outcome: Progressing  1/14/2024 0955 by Anya Joseph RN  Outcome: Progressing     Problem: Safety - Adult  Goal: Free from fall injury  1/14/2024 2204 by Patricia Almanza RN  Outcome: Progressing  1/14/2024 0955 by Anya Joseph RN  Outcome: Progressing     Problem: Skin/Tissue Integrity  Goal: Absence of new skin breakdown  Description: 1.  Monitor for areas of redness and/or skin breakdown  2.  Assess vascular access sites hourly  3.  Every 4-6 hours minimum:  Change oxygen saturation probe site  4.  Every 4-6 hours:  If on nasal continuous positive airway pressure, respiratory therapy assess nares and determine need for appliance change or resting period.  1/14/2024 2204 by Patricia Almanza RN  Outcome: Progressing  1/14/2024 0955 by Anya Joseph RN  Outcome: Progressing     Problem: ABCDS Injury Assessment  Goal: Absence of physical injury  1/14/2024 2204 by Patricia Almanza RN  Outcome: Progressing  1/14/2024 0955 by Anya Joseph RN  Outcome: Progressing     Problem: Chronic Conditions and Co-morbidities  Goal: Patient's chronic conditions and co-morbidity symptoms are monitored and maintained or improved  1/14/2024 2204 by Patricia Almanza RN  Outcome: Progressing  1/14/2024 0955 by Anya Joseph RN  Outcome: Progressing     Problem: Nutrition Deficit:  Goal: Optimize nutritional status  1/14/2024 2204 by Patricia Almanza RN  Outcome: Progressing  1/14/2024 0955 by Anya Joseph RN  Outcome: Progressing

## 2024-01-15 NOTE — CARE COORDINATION
01/15/24 0846   /Social Work Whiteboard Notes   /Social Work Whiteboard 1/15 0845 will need updated OT notes for SNF. sdw

## 2024-01-15 NOTE — PROGRESS NOTES
Occupational Therapy  .  Occupational Therapy Treatment Note    Name: Sebastian Perkins MRN: [id number] :   1960   Date:  1/15/2024   Admission Date: 2024 Room:  [facility]     Primary Problem:  The primary encounter diagnosis was Septicemia (HCC). Diagnoses of Acute cystitis with hematuria, Metastatic malignant neoplasm, unspecified site (HCC), Generalized weakness, Tumor associated pain, and Chest pain, unspecified type were also pertinent to this visit.     Restrictions/Precautions:          spinal precautions, hemovac, general precautions, fall risk, do not lay on back per neurosx      Communication with other providers: Per chart review and Nurse Amaya, patient is appropriate for therapeutic intervention.       Subjective:  Patient states:  Pt agreeable to OT Tx session. \"My right leg is dead.\" Pt reports that cannot move RLE.   Pain:   Location, Type, Intensity (0/10 to 10/10):  3/10, back pain at rest, increases to 9/10 c sitting EOB, resolves c return to side-lying.    Objective:    Observation: Pt received mostly in supine and was re-educated for precautions, including not to lay on back. Requires cues and re-direction throughout Tx session for spinal precautions, use of logroll technique and postural cues while sitting EOB.    Objective Measures:  Victor catheter, Telemetry    Treatment, including education:  Therapeutic Activity Training:   Therapeutic activity training was instructed today.  Cues were given for safety, sequence, UE/LE placement, awareness, and balance.    Activities performed today included bed mobility training, sup-sit, sit-stand, SPT.    Rolling: Max A + cues to use bed rail to assist self c rolling, verbal and tactile cues for logroll technique during rolls R<>L.    Supine to sit: Max A + verbal / tactile cues for maintaining side-lying while BLE are assisted over EOB. Pt demonstrates UE assist.   Sit to supine: Max A + cue for maintaining side-lying prior to repositioning

## 2024-01-15 NOTE — PROGRESS NOTES
[address] [address]   Progress Note  [facility] 1 2 3 4 5      Date: 1/15/2024   Patient: Sebastian Perkins   : 1960   DOA: 2024   MRN: [id number]   ROOM#: 3110/3110-A     Admit Date: 2024     Collaborating Urologist on Call at time of admission: Dr. Palacios  Chief Complaint:   Chief Complaint   Patient presents with    Back Pain     Low back pain from nursing home. Reports chronic pain but worse today. Reports a hard time stand d/t pain     Shoulder Pain     Left shoulder, chronic pain but worse today. NKI       Subjective:     Patient seen and examined.  Resting in bed. Indwelling catheter draining clear yellow urine.    Objective:    Vitals:    /65   Pulse (!) 108   Temp (!) 96 °F (35.6 °C) (Oral)   Resp 15   Ht 1.626 m (5' 4\")   Wt 70 kg (154 lb 5.2 oz)   SpO2 96%   BMI 26.49 kg/m²    Temp  Av.7 °F (36.5 °C)  Min: 96 °F (35.6 °C)  Max: 98.4 °F (36.9 °C)     Intake/Output Summary (Last 24 hours) at 1/15/2024 1018  Last data filed at 2024  Gross per 24 hour   Intake 210 ml   Output 800 ml   Net -590 ml       Physical Exam:   Gen: Pleasant male, in NAD  Neuro: Non-focal  Resp: Unlabored breathing  Abd: soft, non-distended, non-tender to palpation, no rebound  Back:   No CVAT  : Indwelling Victor catheter with clear yellow urine  Ext: No edema of bilateral LEs    Labs:   WBC:    Lab Results   Component Value Date/Time    WBC 13.0 2024 05:44 AM      Hemoglobin/Hematocrit:    Lab Results   Component Value Date/Time    HGB 8.5 2024 05:44 AM    HCT 28.6 2024 05:44 AM      BMP:   Lab Results   Component Value Date/Time     01/15/2024 03:52 AM    K 4.0 01/15/2024 03:52 AM     01/15/2024 03:52 AM    CO2 25 01/15/2024 03:52 AM    BUN 14 01/15/2024 03:52 AM    LABALBU 2.9 2024 06:00 AM    CREATININE 0.5 01/15/2024 03:52 AM    CALCIUM 7.9 01/15/2024 03:52 AM    GFRAA >60 2019 10:32 AM    LABGLOM >60 01/15/2024 03:52 AM

## 2024-01-15 NOTE — ANESTHESIA PRE PROCEDURE
Department of Anesthesiology  Preprocedure Note       Name:  Sebastian Perkins   Age:  63 y.o.  :  1960                                          MRN:  [id number]         Date:  1/15/2024      Surgeon: Surgeon(s):  Hzael Zhou MD    Procedure: Procedure(s):  EGD DIAGNOSTIC ONLY  COLONOSCOPY DIAGNOSTIC    Medications prior to admission:   Prior to Admission medications    Not on File       Current medications:    Current Facility-Administered Medications   Medication Dose Route Frequency Provider Last Rate Last Admin   • enoxaparin (LOVENOX) injection 40 mg  40 mg SubCUTAneous Daily Laura Barber PA-C   40 mg at 01/15/24 0821   • acetaminophen (TYLENOL) tablet 1,000 mg  1,000 mg Oral 3 times per day Presley Vásquez MD   1,000 mg at 01/15/24 1323   • oxyCODONE (ROXICODONE) immediate release tablet 5 mg  5 mg Oral Q4H PRN Presley Vásquez MD   5 mg at 01/15/24 0440    Or   • oxyCODONE HCl (OXY-IR) immediate release tablet 10 mg  10 mg Oral Q4H PRN Presley Vásquez MD   10 mg at 24 2049   • gabapentin (NEURONTIN) capsule 100 mg  100 mg Oral 3 times per day Presley Vásquez MD   100 mg at 01/15/24 1323   • metoprolol tartrate (LOPRESSOR) tablet 25 mg  25 mg Oral BID Nicholas Nathan Bal MD   25 mg at 01/15/24 0821   • sodium chloride flush 0.9 % injection 5-40 mL  5-40 mL IntraVENous 2 times per day Miguelangel Resendiz MD   10 mL at 01/15/24 0822   • sodium chloride flush 0.9 % injection 5-40 mL  5-40 mL IntraVENous PRN Miguelangel Resendiz MD       • 0.9 % sodium chloride infusion   IntraVENous PRN Miguelangel Resendiz MD       • meperidine (DEMEROL) injection 12.5 mg  12.5 mg IntraVENous Q5 Min PRN Miguelangel Resendiz MD       • HYDROmorphone (DILAUDID) injection 0.25 mg  0.25 mg IntraVENous Q5 Min PRN Miguelangel Resendiz MD       • fentaNYL (SUBLIMAZE) injection 50 mcg  50 mcg IntraVENous Q5 Min PRN Miguelangel Resendiz MD   50 mcg at 24 0019   • labetalol (NORMODYNE;TRANDATE) injection 10 mg  10 mg IntraVENous Q15 Min PRN Miguelangel Resendiz MD

## 2024-01-15 NOTE — PROGRESS NOTES
V2.0  [facility] [place]      Name:  Sebastian Perkins /Age/Sex: 1960  (63 y.o. male)   MRN & CSN:  [id number] & [id number] Encounter Date/Time: 1/15/2024 12:33 PM EST    Location:  3110/3110-A PCP: No primary care provider on file.       Hospital Day: 10      Subjective:     Chief Complaint:  Back Pain (Low back pain from nursing home. Reports chronic pain but worse today. Reports a hard time stand d/t pain ) and Shoulder Pain (Left shoulder, chronic pain but worse today. 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  CT abdomen/pelvis on admission with lytic lesions in right pubic bone and left iliac wing, several suspicious hepatic lesions and right hilar lymphadenopathy versus perihilar pulmonary nodules.  Also an indeterminate 2.2 cm right adrenal nodule.  There is also a 1.5 cm indeterminate right renal cystic lesion. CT chest showed lung mass, scapula mass and thoracic fractures.  -Oncology onboard-radiation oncology plan radiation therapy for T spine and left scapular mass  -Chemotherapy stimulation 2 weeks from his surgery date (week of )  -GI consulted - plan for EGD/colonoscopy tuesday    Back pain likely    Pathological thoracic fractures.   Epidural spinal mass, s/p excision  CT showed compression injuries at T2 and T7  MRI showed pathological compression fracture T7 due to metastatis, with extention to epidural space.   -Neurosurgery onboard  -continue pain regimen Percocet q4h  -s/p thoracic fusion and epidural tumor decompression; no further surgical intervention recommended  -pathology report shows potential GI/pancreaticobiliary primary; EGD/colonoscopy tomorrow  -PT/OT team on board - recommended SNF - pending precert for [facility]     SIRS 2/4 on admission.   Hypotension likely in the setting of spinal shock  Tachycardia,  adjustment of the mA/kV was utilized to reduce the radiation dose to as low as reasonably achievable. COMPARISON: None HISTORY: ORDERING SYSTEM PROVIDED HISTORY: Diffuse mets with unknown primary TECHNOLOGIST PROVIDED HISTORY: Reason for exam:->Diffuse mets with unknown primary Additional Contrast?->Radiologist Recommendation Reason for Exam: METS FINDINGS: Mediastinum: Pulmonary arteries appear unremarkable.  No evidence of enlargement of the main pulmonary artery.  Ascending aorta is nonaneurysmal. No evidence of dissection.  Scattered mediastinal nodes but no lymphadenopathy. Lungs/pleura: Bilateral lower lobe atelectatic changes.  Increased interstitial markings and mild interstitial nodularity but no consolidative infiltrates.  Lymphangitic spread is possible.  More discrete mass can be seen in the right lower lobe measures 1.6 x 1.6 cm adjacent to the right lower lobe pulmonary artery/vein.  No active pleural disease. Upper Abdomen: Scattered liver lesions likely represent metastatic disease. Right adrenal mass measures 2 cm with mean Hounsfield units of 97 likely represents metastatic disease. Soft Tissues/Bones: A left scapular mass measures 5.9 x 8.2 cm with destructive changes of the scapula.  Compression injuries at T2 and T7.  At the level of T6 and T7 a destructive mass can be seen posteriorly destroying vertebral body and posterior elements as well as rib destructive changes. There is invasion into the spinal canal and epidural space.  Moderate kyphotic deformity.  Incidental bilateral thyroid nodules.     1. No acute abnormalities of the thoracic aorta or pulmonary arteries. 2. Bilateral lower lobe atelectatic changes. Increased interstitial markings and mild interstitial nodularity but no consolidative infiltrates. Lymphangitic cancer spread is possible. More discrete mass can be seen in the right lower lobe measures 1.6 x 1.6 cm adjacent to the right lower lobe pulmonary artery/vein. 3. Scattered  pubic bone and left iliac wing, several suspicious hepatic lesions, and right hilar lymphadenopathy versus perihilar pulmonary nodules.  Recommend further evaluation with a CT of the chest. 2. 2.2 cm indeterminate right adrenal nodule.  Consider further evaluation with adrenal protocol MRI or CT. 3. Moderate prostatomegaly with moderate urinary bladder distension. Layering calculi within the urinary bladder lumen. 4. 1.5 cm indeterminate right renal cystic lesion.  Consider further evaluation with a renal protocol MRI or CT.  Suspected post treatment changes in the superior pole of the left kidney.  Recommend correlation with previous intervention. RECOMMENDATIONS: 2.2 cm right adrenal mass, probable benign adenoma. Recommend adrenal washout CT or chemical shift MRI. JACR 2017 Aug; 14(8):1038-44, JCAT 2016 Mar-Apr; 40(2):194-200, Urol J 2006 Spring; 3(2):71-4. 1.8 cm right solid pulmonary nodule detected on incomplete chest CT. Per Fleischner Society Guidelines, recommend prompt non-contrast Chest CT for further evaluation. These guidelines do not apply to immunocompromised patients and patients with cancer. Follow up in patients with significant comorbidities as clinically warranted. For lung cancer screening, adhere to Lung-RADS guidelines. Reference: Radiology. 2017; 284(1):228-43.     XR CHEST PORTABLE    Result Date: 1/6/2024  EXAMINATION: ONE XRAY VIEW OF THE CHEST 1/6/2024 11:20 am COMPARISON: None. HISTORY: ORDERING SYSTEM PROVIDED HISTORY: weakness, tachycardia TECHNOLOGIST PROVIDED HISTORY: Reason for exam:->weakness, tachycardia Reason for Exam: weakness, tachycardia FINDINGS: There is some scarring noted in the right lung base.  Lungs otherwise are clear.  Heart and mediastinal structures appear normal.  Bony thorax is unremarkable.     No acute cardiopulmonary process.       Electronically signed by Aniya Valencia MD on 1/15/2024 at 12:55 PM

## 2024-01-15 NOTE — PROGRESS NOTES
[facility]    [facility]  Progress Note  1/15/2024  7:31 AM  ___________________________________________________________________________  Patient:    Sebastian Perkins  : 1960   63 y.o.             MRN: [id number]  Admitted: 2024 10:11 AM ATT: Aniya Valencia MD   3110/3110-A  AdmitDx: Weakness [R53.1]  Septicemia (HCC) [A41.9]  Acute cystitis with hematuria [N30.01]  Metastatic malignant neoplasm, unspecified site (HCC) [C79.9]  PCP: No primary care provider on file.  ___________________________________________________________________________  ASSESSMENT AND PLAN :    Impression:  Metastatic Adenocarcinoma of unknown origin - concern for GI primary  - Strong Fhx of CRC  Thoracic vertebral fracture s/p repair  Multiple liver lesions               - AFP, CA 19-9 normal               - CEA elevated  T7 paraspinal tumor path consistent with metastatic carcinoma  - Path raises possibility of GI/pancreaticobiliary primary  - No previous colonoscopy despite strong family history of CRC     Discussion:  Discussed with patient about path findings and evaluating with an EGD and a colonoscopy.  He says that he would like to undergo procedures while he is in the hospital.  He is too weak to do it as an outpatient which I agree.  Also is limited with ambulation given poor mobility and lower extremity weakness.     2024 - Patient with flat affect, minimally conversant. Noted spastic paralysis of ortiz LE. Sister at bedside reports strong family history of CRC (herself included), patient reports no previous endoscopies.      Plan:  - Continue supportive care    - Pain control per primary  - CLD today with colonoscopy prep ordered. NPO at MN. Ok to finish prep after midnight and drink water till 5 am.   - Posted for EGD + colonoscopy on Tuesday, 24     Patient clinical, biochemical, and radiological information discussed with Dr. Zhou. He agrees with the assessment and plan

## 2024-01-15 NOTE — PROGRESS NOTES
Neurosurgery Progress Note  1/15/2024 10:55 AM      Left T6-8 laminectomy, t5-9 percutaneous instrumentation and fusion   POD: 5    Assessment and Plan:   S/P thoracic fusion and epidural tumor decompression- multiple specimens sent to pathology, frozen specimen returning as malignant. Final path report showing concern for GI/pancreaticobiliary primary. CBC 7.5 yesterday, am labs pending, continue to monitor. Incentive spirometer encouraged. Keep patient on sides and turn frequently. Do not lay on back. Patient will need aggressive rehab. Dr. Ragland saw patient over the weekend. Concern for recurrent spinal stenosis causing weakness. Dr. Ragland spoke with family per notes. Prophylactic lovenox on board. Keep garcia in place for now until mobility has improved. Had traumatic insertion of garcia.   Metastatic disease- unknown primary, oncology, Dr. Rollins on board. Have consulted rad once. Will need radiation after incisions heal, ideally 2 weeks post-op. Rad once has seen the patient, appreciate their assistance. Will need to be arranged transport to present the week of 1/22 for radiation.   Hx of right MCA stroke- patient with significant left upper and lower extremity weakness and sensory loss after stroke in [place] several years ago. Has been residing in a nursing facility since.     Supervising physician: Nallely Ragland MD.  Dr. Ragland was readily and continuously available by phone for direct consultation regarding the care of this patient.    Dragon dictation may have been used in the writing of this note. Spelling or word errors may have occurred. Please call for clarification if there are concerns.      Subjective:   Admit Date: 1/6/2024  PCP: No primary care provider on file.    Patient doing well. No new concerns.     Data:   Scheduled Meds:   polyethylene glycol  4,000 mL Oral Once    enoxaparin  40 mg SubCUTAneous Daily    acetaminophen  1,000 mg Oral 3 times per day    gabapentin  100 mg Oral 3 times per day

## 2024-01-15 NOTE — PROGRESS NOTES
Patient Name:  Sebastian Perkins  Patient :  1960  Patient MRN:  [id number]     Primary Oncologist: Jose Rollins MD  Referring Provider: No primary care provider on file.    Sebastian Perkins is a 63 y.o. male with medical history significant for h/o stroke with left sided weakness, presented to [facility] on 24 after he was unable to sit up and fell back in bed.      Patient is limited with his mobility at baseline due to prior stroke which is immobilized his left upper and lower extremities.  Uses a wheelchair at baseline.  Reports back/shoulder pain.      CT abdomen/pelvis on 24 showed metastatic disease of uncertain origin with lytic lesions in the right pubic bone and left iliac wing, several suspicious hepatic lesions, and right hilar lymphadenopathy versus perihilar pulmonary nodules. 2.2 cm indeterminate right adrenal nodule.   Moderate prostatomegaly with moderate urinary bladder distension. Layering calculi within the urinary bladder lumen.1.5 cm indeterminate right renal cystic lesion. Suspected post treatment changes in the superior pole of the left kidney.     I was called to evaluate him. He has lost weight lately. He is a smoker and he is a residence of [facility].     CT Chest 2023 showed compression injuries at T2, T7, destructive mass at T6/T7 with posterior destruction of vertebral body, invasion into spinal canal and epidural space, interstitial nodularity, RLL mass 1.6 x 1.6 cm, L scapular mass measuring 5.9 x 8.2 cm with destructive changes.     Pathology positive for metastatic carcinoma with immunohistochemical stains most consistent with UGI/pancreatic primary however cannot RO lower GI or lung primary.    Interval 1/15/24  Pt was seen and examined. Had surgery on 24. He still has weakness in R leg and chronic deficits are present on L. He agreed to have radiation to [facility] procedural bed as well as L scapular mass. We sent biopsy tissue for [employer] panel. GI is planning for EGD

## 2024-01-15 NOTE — CARE COORDINATION
[facility] to start precert. They have questions about radiation times. Message to Dr Rollins to ask if this has been arranged yet or if we this something that will be set up in the future.   Per Dr Rollins, he is planning to do simulation in 1 week and start around 2 weeks from now. Our office is setting up for the plan.  Landy at [facility] notified/   Once precert is approved:   Discharging Nurse; Upon discharge pt will be going to [facility], report can be called to [phone], AVS can be faxed to [phone], set up transport via [facility] [phone].

## 2024-01-16 ENCOUNTER — ANESTHESIA (OUTPATIENT)
Dept: ENDOSCOPY | Age: 64
End: 2024-01-16
Payer: COMMERCIAL

## 2024-01-16 LAB
ANION GAP SERPL CALCULATED.3IONS-SCNC: 14 MMOL/L (ref 7–16)
BUN SERPL-MCNC: 13 MG/DL (ref 6–23)
CALCIUM SERPL-MCNC: 7.9 MG/DL (ref 8.3–10.6)
CHLORIDE BLD-SCNC: 101 MMOL/L (ref 99–110)
CO2: 21 MMOL/L (ref 21–32)
CREAT SERPL-MCNC: 0.4 MG/DL (ref 0.9–1.3)
EKG ATRIAL RATE: 112 BPM
EKG DIAGNOSIS: NORMAL
EKG P AXIS: 41 DEGREES
EKG P-R INTERVAL: 130 MS
EKG Q-T INTERVAL: 348 MS
EKG QRS DURATION: 82 MS
EKG QTC CALCULATION (BAZETT): 475 MS
EKG R AXIS: -10 DEGREES
EKG T AXIS: 47 DEGREES
EKG VENTRICULAR RATE: 112 BPM
GFR SERPL CREATININE-BSD FRML MDRD: >60 ML/MIN/1.73M2
GLUCOSE SERPL-MCNC: 101 MG/DL (ref 70–99)
MAGNESIUM: 2.5 MG/DL (ref 1.8–2.4)
PHOSPHORUS: 4.2 MG/DL (ref 2.5–4.9)
POTASSIUM SERPL-SCNC: 4.9 MMOL/L (ref 3.5–5.1)
SODIUM BLD-SCNC: 136 MMOL/L (ref 135–145)

## 2024-01-16 PROCEDURE — 93010 ELECTROCARDIOGRAM REPORT: CPT | Performed by: INTERNAL MEDICINE

## 2024-01-16 PROCEDURE — 0DB58ZX EXCISION OF ESOPHAGUS, VIA NATURAL OR ARTIFICIAL OPENING ENDOSCOPIC, DIAGNOSTIC: ICD-10-PCS | Performed by: INTERNAL MEDICINE

## 2024-01-16 PROCEDURE — 2709999900 HC NON-CHARGEABLE SUPPLY: Performed by: INTERNAL MEDICINE

## 2024-01-16 PROCEDURE — 6360000002 HC RX W HCPCS: Performed by: NURSE ANESTHETIST, CERTIFIED REGISTERED

## 2024-01-16 PROCEDURE — 3609012400 HC EGD TRANSORAL BIOPSY SINGLE/MULTIPLE: Performed by: INTERNAL MEDICINE

## 2024-01-16 PROCEDURE — 6370000000 HC RX 637 (ALT 250 FOR IP): Performed by: STUDENT IN AN ORGANIZED HEALTH CARE EDUCATION/TRAINING PROGRAM

## 2024-01-16 PROCEDURE — 94761 N-INVAS EAR/PLS OXIMETRY MLT: CPT

## 2024-01-16 PROCEDURE — 3700000001 HC ADD 15 MINUTES (ANESTHESIA): Performed by: INTERNAL MEDICINE

## 2024-01-16 PROCEDURE — 2580000003 HC RX 258: Performed by: NURSE ANESTHETIST, CERTIFIED REGISTERED

## 2024-01-16 PROCEDURE — 84100 ASSAY OF PHOSPHORUS: CPT

## 2024-01-16 PROCEDURE — 99024 POSTOP FOLLOW-UP VISIT: CPT | Performed by: PHYSICIAN ASSISTANT

## 2024-01-16 PROCEDURE — 3700000000 HC ANESTHESIA ATTENDED CARE: Performed by: INTERNAL MEDICINE

## 2024-01-16 PROCEDURE — 80048 BASIC METABOLIC PNL TOTAL CA: CPT

## 2024-01-16 PROCEDURE — 6370000000 HC RX 637 (ALT 250 FOR IP): Performed by: PHYSICIAN ASSISTANT

## 2024-01-16 PROCEDURE — 6360000002 HC RX W HCPCS: Performed by: PHYSICIAN ASSISTANT

## 2024-01-16 PROCEDURE — 6370000000 HC RX 637 (ALT 250 FOR IP): Performed by: NEUROLOGICAL SURGERY

## 2024-01-16 PROCEDURE — 94150 VITAL CAPACITY TEST: CPT

## 2024-01-16 PROCEDURE — 2140000000 HC CCU INTERMEDIATE R&B

## 2024-01-16 PROCEDURE — 2580000003 HC RX 258: Performed by: STUDENT IN AN ORGANIZED HEALTH CARE EDUCATION/TRAINING PROGRAM

## 2024-01-16 PROCEDURE — 51702 INSERT TEMP BLADDER CATH: CPT

## 2024-01-16 PROCEDURE — 2500000003 HC RX 250 WO HCPCS: Performed by: NURSE ANESTHETIST, CERTIFIED REGISTERED

## 2024-01-16 PROCEDURE — 99232 SBSQ HOSP IP/OBS MODERATE 35: CPT | Performed by: INTERNAL MEDICINE

## 2024-01-16 PROCEDURE — 83735 ASSAY OF MAGNESIUM: CPT

## 2024-01-16 PROCEDURE — 0DB68ZX EXCISION OF STOMACH, VIA NATURAL OR ARTIFICIAL OPENING ENDOSCOPIC, DIAGNOSTIC: ICD-10-PCS | Performed by: INTERNAL MEDICINE

## 2024-01-16 PROCEDURE — 6370000000 HC RX 637 (ALT 250 FOR IP): Performed by: INTERNAL MEDICINE

## 2024-01-16 PROCEDURE — 88305 TISSUE EXAM BY PATHOLOGIST: CPT | Performed by: PATHOLOGY

## 2024-01-16 PROCEDURE — 0DB98ZX EXCISION OF DUODENUM, VIA NATURAL OR ARTIFICIAL OPENING ENDOSCOPIC, DIAGNOSTIC: ICD-10-PCS | Performed by: INTERNAL MEDICINE

## 2024-01-16 PROCEDURE — 36415 COLL VENOUS BLD VENIPUNCTURE: CPT

## 2024-01-16 RX ORDER — SODIUM CHLORIDE 9 MG/ML
INJECTION, SOLUTION INTRAVENOUS CONTINUOUS PRN
Status: DISCONTINUED | OUTPATIENT
Start: 2024-01-16 | End: 2024-01-16 | Stop reason: SDUPTHER

## 2024-01-16 RX ORDER — PROPOFOL 10 MG/ML
INJECTION, EMULSION INTRAVENOUS PRN
Status: DISCONTINUED | OUTPATIENT
Start: 2024-01-16 | End: 2024-01-16 | Stop reason: SDUPTHER

## 2024-01-16 RX ORDER — LIDOCAINE HYDROCHLORIDE 20 MG/ML
INJECTION, SOLUTION EPIDURAL; INFILTRATION; INTRACAUDAL; PERINEURAL PRN
Status: DISCONTINUED | OUTPATIENT
Start: 2024-01-16 | End: 2024-01-16 | Stop reason: SDUPTHER

## 2024-01-16 RX ADMIN — LIDOCAINE HYDROCHLORIDE 100 MG: 20 INJECTION, SOLUTION EPIDURAL; INFILTRATION; INTRACAUDAL; PERINEURAL at 09:58

## 2024-01-16 RX ADMIN — ENOXAPARIN SODIUM 40 MG: 100 INJECTION SUBCUTANEOUS at 11:47

## 2024-01-16 RX ADMIN — ACETAMINOPHEN 1000 MG: 500 TABLET ORAL at 12:43

## 2024-01-16 RX ADMIN — SENNOSIDES, DOCUSATE SODIUM 1 TABLET: 8.6; 5 TABLET ORAL at 20:53

## 2024-01-16 RX ADMIN — GABAPENTIN 100 MG: 100 CAPSULE ORAL at 20:55

## 2024-01-16 RX ADMIN — GABAPENTIN 100 MG: 100 CAPSULE ORAL at 12:43

## 2024-01-16 RX ADMIN — OXYCODONE HYDROCHLORIDE 5 MG: 5 TABLET ORAL at 06:27

## 2024-01-16 RX ADMIN — OXYCODONE HYDROCHLORIDE 5 MG: 5 TABLET ORAL at 20:53

## 2024-01-16 RX ADMIN — METOPROLOL TARTRATE 50 MG: 50 TABLET, FILM COATED ORAL at 11:47

## 2024-01-16 RX ADMIN — PROPOFOL 50 MG: 10 INJECTION, EMULSION INTRAVENOUS at 10:00

## 2024-01-16 RX ADMIN — PROPOFOL 100 MG: 10 INJECTION, EMULSION INTRAVENOUS at 09:58

## 2024-01-16 RX ADMIN — SODIUM CHLORIDE: 9 INJECTION, SOLUTION INTRAVENOUS at 09:54

## 2024-01-16 RX ADMIN — ACETAMINOPHEN 1000 MG: 500 TABLET ORAL at 20:54

## 2024-01-16 RX ADMIN — TAMSULOSIN HYDROCHLORIDE 0.4 MG: 0.4 CAPSULE ORAL at 11:47

## 2024-01-16 RX ADMIN — METOPROLOL TARTRATE 50 MG: 50 TABLET, FILM COATED ORAL at 20:53

## 2024-01-16 RX ADMIN — SODIUM CHLORIDE, PRESERVATIVE FREE 10 ML: 5 INJECTION INTRAVENOUS at 22:20

## 2024-01-16 ASSESSMENT — PAIN DESCRIPTION - DESCRIPTORS
DESCRIPTORS: ACHING
DESCRIPTORS: ACHING

## 2024-01-16 ASSESSMENT — PAIN SCALES - GENERAL
PAINLEVEL_OUTOF10: 4
PAINLEVEL_OUTOF10: 0
PAINLEVEL_OUTOF10: 4

## 2024-01-16 ASSESSMENT — PAIN DESCRIPTION - LOCATION
LOCATION: BACK
LOCATION: GENERALIZED

## 2024-01-16 ASSESSMENT — LIFESTYLE VARIABLES: SMOKING_STATUS: 1

## 2024-01-16 ASSESSMENT — PAIN DESCRIPTION - FREQUENCY: FREQUENCY: INTERMITTENT

## 2024-01-16 ASSESSMENT — PAIN - FUNCTIONAL ASSESSMENT: PAIN_FUNCTIONAL_ASSESSMENT: 0-10

## 2024-01-16 NOTE — PROGRESS NOTES
Neurosurgery Progress Note  1/16/2024 12:24 PM      Left T6-8 laminectomy, t5-9 percutaneous instrumentation and fusion   POD: 7    Assessment and Plan:   S/P thoracic fusion and epidural tumor decompression- multiple specimens sent to pathology, frozen specimen returning as malignant. Final path report showing concern for GI/pancreaticobiliary primary. CBC 7.5 yesterday, am labs pending, continue to monitor. Incentive spirometer encouraged. Keep patient on sides and turn frequently. Do not lay on back. Patient will need aggressive rehab. Dr. Ragland saw patient over the weekend. Concern for recurrent spinal stenosis causing weakness. Spoke with his sister today about our recommendations of no further surgical intervention from the standpoint of the spine and that we recommend hospice. Palliative radiation can still be considered if he has pain in the spine. Today he is not endorsing pain at the surgical site. Will relay these recommendations to oncology. Prophylactic lovenox on board. Keep garcia in place. Had traumatic insertion of garcia.   Metastatic disease- unknown primary, oncology, Dr. Rollins on board. Have consulted rad once. Will need radiation after incisions heal, ideally 2 weeks post-op. Rad once has seen the patient, appreciate their assistance. Will need to be arranged transport to present the week of 1/22 for radiation.   Hx of right MCA stroke- patient with significant left upper and lower extremity weakness and sensory loss after stroke in [place] several years ago. Has been residing in a nursing facility since    Supervising physician: Nallely Ragland MD.  Dr. Ragland was readily and continuously available by phone for direct consultation regarding the care of this patient.    Dragon dictation may have been used in the writing of this note. Spelling or word errors may have occurred. Please call for clarification if there are concerns.      Subjective:   Admit Date: 1/6/2024  PCP: No primary care provider

## 2024-01-16 NOTE — ANESTHESIA PRE PROCEDURE
Department of Anesthesiology  Preprocedure Note       Name:  Sebastian Perkins   Age:  63 y.o.  :  1960                                          MRN:  [id number]         Date:  2024      Surgeon: Surgeon(s):  Hazel Zhou MD    Procedure: Procedure(s):  EGD DIAGNOSTIC ONLY    Medications prior to admission:   Prior to Admission medications    Not on File       Current medications:    No current facility-administered medications for this visit.     No current outpatient medications on file.     Facility-Administered Medications Ordered in Other Visits   Medication Dose Route Frequency Provider Last Rate Last Admin   • metoprolol tartrate (LOPRESSOR) tablet 50 mg  50 mg Oral BID Aniya Valencia MD   50 mg at 01/15/24 2034   • enoxaparin (LOVENOX) injection 40 mg  40 mg SubCUTAneous Daily Laura Barber PA-C   40 mg at 01/15/24 0821   • acetaminophen (TYLENOL) tablet 1,000 mg  1,000 mg Oral 3 times per day Presley Vásquez MD   1,000 mg at 01/15/24 2033   • oxyCODONE (ROXICODONE) immediate release tablet 5 mg  5 mg Oral Q4H PRN Presley Vásquez MD   5 mg at 24 0627    Or   • oxyCODONE HCl (OXY-IR) immediate release tablet 10 mg  10 mg Oral Q4H PRN Presley Vásquez MD   10 mg at 24   • gabapentin (NEURONTIN) capsule 100 mg  100 mg Oral 3 times per day Presley Vásquez MD   100 mg at 01/15/24 2034   • polyethylene glycol (GLYCOLAX) packet 17 g  17 g Oral Daily Nallely Ragland MD       • diazePAM (VALIUM) tablet 5 mg  5 mg Oral Q8H PRN Nallely Ragland MD   5 mg at 01/15/24 0539   • sennosides-docusate sodium (SENOKOT-S) 8.6-50 MG tablet 1 tablet  1 tablet Oral BID Nallely Ragland MD   1 tablet at 01/15/24 2034   • HYDROmorphone (DILAUDID) injection 0.5 mg  0.5 mg IntraVENous Q3H PRN Nallely Ragland MD       • tamsulosin (FLOMAX) capsule 0.4 mg  0.4 mg Oral Daily Lenny Branch PA-C   0.4 mg at 01/15/24 0821   • sodium chloride flush 0.9 % injection 5-40 mL  5-40 mL IntraVENous 2 times per

## 2024-01-16 NOTE — PROGRESS NOTES
Dr Haezl Zhou with GI made aware at 1824 of pt drinking bowel prep very slowly, with only approximatetly 750 to 1000 mL drunk up to that point and pt complaining that he does not think he can drink any more prep.  Also made aware that pt has not yet had a bowel movement after starting his bowel prep. Dr Zhou ordered Reglan IV x1 and soap suds enema x1.

## 2024-01-16 NOTE — PROGRESS NOTES
Dr Resendiz at bedside to see patient. Notified of tachycardia. Monitor tracing reviewed. No new orders received.

## 2024-01-16 NOTE — CARE COORDINATION
Pre-cert received for [facility] and is good through 01/21 per [facility]/AV.  They are able to accept pt whenever medically ready.  Notified Dr Willoughby via PS.  D/C INSTRUCTIONS ARE ON FRONT OF PACKET LOCATED WITH THE SOFT CHART.  TE    .Discharging Nurse; Upon discharge pt will be going to [facility], report can be called to [phone], AVS can be faxed to [phone], set up transport via [facility] [phone].     Left arm;

## 2024-01-16 NOTE — PROGRESS NOTES
V2.0    [facility] [place]      Name:  Sebastian Perkins /Age/Sex: 1960  (63 y.o. male)   MRN & CSN:  [id number] & [id number] Encounter Date/Time: 2024 2:02 PM EST   Location:  [facility]0/3110-A PCP: No primary care provider on file.     Attending:Mat Willoughby MD       Hospital Day: 11    Assessment and Recommendations   Sebastian Perkins is a 63 y.o. 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    -CT abdomen/pelvis on admission with lytic lesions in right pubic bone and left iliac wing, several suspicious hepatic lesions and right hilar lymphadenopathy versus perihilar pulmonary nodules.  Also an indeterminate 2.2 cm right adrenal nodule.  There is also a 1.5 cm indeterminate right renal cystic lesion. CT chest showed lung mass, scapula mass and thoracic fractures.  -Oncology onboard-radiation oncology plan radiation therapy for T spine and left scapular mass  -Chemotherapy stimulation 2 weeks from his surgery date (week of )  -GI consulted -status post EGD today     Back pain likely    Pathological thoracic fractures.   Epidural spinal mass, s/p excision  -CT showed compression injuries at T2 and T7  -MRI showed pathological compression fracture T7 due to metastatis, with extention to epidural space.   -Neurosurgery onboard  -continue pain regimen Percocet q4h  -s/p thoracic fusion and epidural tumor decompression; no further surgical intervention recommended  -pathology report shows potential GI/pancreaticobiliary primary  -PT/OT team on board - recommended SNF - pending precert for [facility]     SIRS 2/4 on admission.   Hypotension likely in the setting of spinal shock  Tachycardia, leukocytosis  wBC 15.9-> 15.0. no sign of infection aside from UTI as below.   -CXR clear on admission  -f/u blood cx - negative to date, urine cx  showing mixed growth  -pro-viviana 0.447  -Completed antibiotics with Rocephin     UTI  Generalized weakness. Denies urinary symptoms has hematuria trauamatic from garcia placement  -compteted Rocephin, urine cx showing mixed growth     Urinary retention. Has garcia placed.   CBI  Urology onboard.  To continue garcia catheter at discharge     Hx of CVA   -residual left sided weakness                  Diet Diet NPO Exceptions are: Sips of Water with Meds  ADULT DIET; Clear Liquid   DVT Prophylaxis [x] Lovenox, []  Heparin, [] SCDs, [] Ambulation,  [] Eliquis, [] Xarelto  [] Coumadin   Code Status Full Code   Disposition From: [facility]  Expected Disposition: [facility]  Estimated Date of Discharge: After EGD/colonoscopy  Patient requires continued admission due to cancer workup   Surrogate Decision Maker/ POA       Personally reviewed Lab Studies and Imaging         Subjective:     Chief Complaint:     Patient seen and examined at bedside this morning.  Status post EGD.  Denies chest pain, abdominal pain, nausea vomiting, fever or chills      Review of Systems:      Pertinent positives and negatives discussed in HPI    Objective:     Intake/Output Summary (Last 24 hours) at 1/16/2024 1402  Last data filed at 1/16/2024 1002  Gross per 24 hour   Intake 440 ml   Output 2850 ml   Net -2410 ml      Vitals:   Vitals:    01/16/24 0530 01/16/24 0855 01/16/24 1033 01/16/24 1140   BP: 136/77 120/67 116/66 134/62   Pulse: (!) 117 (!) 120 (!) 126 (!) 135   Resp:  18 19 21   Temp:  97.8 °F (36.6 °C) 97.9 °F (36.6 °C) 98.2 °F (36.8 °C)   TempSrc:  Temporal Oral Oral   SpO2: 95% 100%  93%   Weight:       Height:             Physical Exam:      General: NAD  Eyes: EOMI  ENT: neck supple  Cardiovascular: Regular rate.  Respiratory: Clear to auscultation  Gastrointestinal: Soft, non tender  Genitourinary: no suprapubic tenderness  Musculoskeletal: No edema  Skin: warm, dry  Neuro: Alert.  Psych: Mood appropriate.         Medications:   Medications:

## 2024-01-16 NOTE — PROGRESS NOTES
Patient Name:  Sebastian Perkins  Patient :  1960  Patient MRN:  [id number]     Primary Oncologist: Jose Rollins MD  Referring Provider: No primary care provider on file.    Sebastian Perkins is a 63 y.o. male with medical history significant for h/o stroke with left sided weakness, presented to [facility] on 24 after he was unable to sit up and fell back in bed.      Patient is limited with his mobility at baseline due to prior stroke which is immobilized his left upper and lower extremities.  Uses a wheelchair at baseline.  Reports back/shoulder pain.      CT abdomen/pelvis on 24 showed metastatic disease of uncertain origin with lytic lesions in the right pubic bone and left iliac wing, several suspicious hepatic lesions, and right hilar lymphadenopathy versus perihilar pulmonary nodules. 2.2 cm indeterminate right adrenal nodule.   Moderate prostatomegaly with moderate urinary bladder distension. Layering calculi within the urinary bladder lumen.1.5 cm indeterminate right renal cystic lesion. Suspected post treatment changes in the superior pole of the left kidney.     I was called to evaluate him. He has lost weight lately. He is a smoker and he is a residence of [facility].     CT Chest 2023 showed compression injuries at T2, T7, destructive mass at T6/T7 with posterior destruction of vertebral body, invasion into spinal canal and epidural space, interstitial nodularity, RLL mass 1.6 x 1.6 cm, L scapular mass measuring 5.9 x 8.2 cm with destructive changes.     Pathology positive for metastatic carcinoma with immunohistochemical stains most consistent with UGI/pancreatic primary however cannot RO lower GI or lung primary.    Interval 24  Pt was seen and examined. Had surgery on 24. He still has weakness in R leg and chronic deficits are present on L. He agreed to have radiation to [facility] procedural bed as well as L scapular mass. We sent biopsy tissue for [employer] panel. GI is planning for EGD

## 2024-01-16 NOTE — ANESTHESIA POSTPROCEDURE EVALUATION
Department of Anesthesiology  Postprocedure Note    Patient: Sebastian Perkins  MRN: [id number]  YOB: 1960  Date of evaluation: 1/16/2024    Procedure Summary       Date: 01/16/24 Room / Location: [place] [address] / [facility]    Anesthesia Start: 0954 Anesthesia Stop: 1011    Procedure: EGD BIOPSY Diagnosis:       Neoplasm /cancer (HCC)      (Neoplasm /cancer (HCC) [C80.1])    Surgeons: Hazel Zhou MD Responsible Provider: Miguelangel Resendiz MD    Anesthesia Type: general ASA Status: 4            Anesthesia Type: No value filed.    Ayden Phase I: Ayedn Score: 6    Ayden Phase II: Ayden Score: 8    Anesthesia Post Evaluation    Patient location during evaluation: bedside  Patient participation: complete - patient participated  Level of consciousness: awake  Pain score: 0  Airway patency: patent  Nausea & Vomiting: no nausea and no vomiting  Cardiovascular status: hemodynamically stable  Respiratory status: acceptable  Hydration status: stable  Pain management: adequate    No notable events documented.

## 2024-01-16 NOTE — PROGRESS NOTES
Attending physician Dr Valencia made aware by secure messaging at 1703 of pt's HR sustaining 120s. EKG ordered. Dr Valencia notified of EKG results at 1836.

## 2024-01-17 LAB
ANION GAP SERPL CALCULATED.3IONS-SCNC: 13 MMOL/L (ref 7–16)
BUN SERPL-MCNC: 14 MG/DL (ref 6–23)
CALCIUM SERPL-MCNC: 7.9 MG/DL (ref 8.3–10.6)
CHLORIDE BLD-SCNC: 101 MMOL/L (ref 99–110)
CO2: 23 MMOL/L (ref 21–32)
CREAT SERPL-MCNC: 0.4 MG/DL (ref 0.9–1.3)
GFR SERPL CREATININE-BSD FRML MDRD: >60 ML/MIN/1.73M2
GLUCOSE SERPL-MCNC: 111 MG/DL (ref 70–99)
HCT VFR BLD CALC: 26.7 % (ref 42–52)
HEMOGLOBIN: 8 GM/DL (ref 13.5–18)
MAGNESIUM: 2.2 MG/DL (ref 1.8–2.4)
MCH RBC QN AUTO: 27.5 PG (ref 27–31)
MCHC RBC AUTO-ENTMCNC: 30 % (ref 32–36)
MCV RBC AUTO: 91.8 FL (ref 78–100)
PDW BLD-RTO: 15.9 % (ref 11.7–14.9)
PHOSPHORUS: 3.8 MG/DL (ref 2.5–4.9)
PLATELET # BLD: 434 K/CU MM (ref 140–440)
PMV BLD AUTO: 9.9 FL (ref 7.5–11.1)
POTASSIUM SERPL-SCNC: 4.5 MMOL/L (ref 3.5–5.1)
RBC # BLD: 2.91 M/CU MM (ref 4.6–6.2)
SODIUM BLD-SCNC: 137 MMOL/L (ref 135–145)
WBC # BLD: 13.4 K/CU MM (ref 4–10.5)

## 2024-01-17 PROCEDURE — 6370000000 HC RX 637 (ALT 250 FOR IP): Performed by: PHYSICIAN ASSISTANT

## 2024-01-17 PROCEDURE — 2580000003 HC RX 258: Performed by: STUDENT IN AN ORGANIZED HEALTH CARE EDUCATION/TRAINING PROGRAM

## 2024-01-17 PROCEDURE — 36415 COLL VENOUS BLD VENIPUNCTURE: CPT

## 2024-01-17 PROCEDURE — 99232 SBSQ HOSP IP/OBS MODERATE 35: CPT | Performed by: PHYSICIAN ASSISTANT

## 2024-01-17 PROCEDURE — 94150 VITAL CAPACITY TEST: CPT

## 2024-01-17 PROCEDURE — 80048 BASIC METABOLIC PNL TOTAL CA: CPT

## 2024-01-17 PROCEDURE — 6370000000 HC RX 637 (ALT 250 FOR IP): Performed by: STUDENT IN AN ORGANIZED HEALTH CARE EDUCATION/TRAINING PROGRAM

## 2024-01-17 PROCEDURE — 2140000000 HC CCU INTERMEDIATE R&B

## 2024-01-17 PROCEDURE — 6360000002 HC RX W HCPCS

## 2024-01-17 PROCEDURE — 94761 N-INVAS EAR/PLS OXIMETRY MLT: CPT

## 2024-01-17 PROCEDURE — 84100 ASSAY OF PHOSPHORUS: CPT

## 2024-01-17 PROCEDURE — 85027 COMPLETE CBC AUTOMATED: CPT

## 2024-01-17 PROCEDURE — 6370000000 HC RX 637 (ALT 250 FOR IP)

## 2024-01-17 PROCEDURE — 6370000000 HC RX 637 (ALT 250 FOR IP): Performed by: NEUROLOGICAL SURGERY

## 2024-01-17 PROCEDURE — 83735 ASSAY OF MAGNESIUM: CPT

## 2024-01-17 PROCEDURE — 6360000002 HC RX W HCPCS: Performed by: PHYSICIAN ASSISTANT

## 2024-01-17 PROCEDURE — 99024 POSTOP FOLLOW-UP VISIT: CPT | Performed by: PHYSICIAN ASSISTANT

## 2024-01-17 PROCEDURE — 6370000000 HC RX 637 (ALT 250 FOR IP): Performed by: INTERNAL MEDICINE

## 2024-01-17 RX ORDER — SIMETHICONE 80 MG
80 TABLET,CHEWABLE ORAL EVERY 6 HOURS
Status: DISPENSED | OUTPATIENT
Start: 2024-01-17 | End: 2024-01-18

## 2024-01-17 RX ORDER — ONDANSETRON 2 MG/ML
4 INJECTION INTRAMUSCULAR; INTRAVENOUS EVERY 6 HOURS
Status: DISPENSED | OUTPATIENT
Start: 2024-01-17 | End: 2024-01-18

## 2024-01-17 RX ADMIN — ONDANSETRON 4 MG: 2 INJECTION INTRAMUSCULAR; INTRAVENOUS at 22:39

## 2024-01-17 RX ADMIN — SIMETHICONE 80 MG: 80 TABLET, CHEWABLE ORAL at 18:08

## 2024-01-17 RX ADMIN — POLYETHYLENE GLYCOL (3350) 17 G: 17 POWDER, FOR SOLUTION ORAL at 08:50

## 2024-01-17 RX ADMIN — SODIUM CHLORIDE, PRESERVATIVE FREE 10 ML: 5 INJECTION INTRAVENOUS at 08:52

## 2024-01-17 RX ADMIN — ENOXAPARIN SODIUM 40 MG: 100 INJECTION SUBCUTANEOUS at 08:51

## 2024-01-17 RX ADMIN — ONDANSETRON 4 MG: 2 INJECTION INTRAMUSCULAR; INTRAVENOUS at 18:08

## 2024-01-17 RX ADMIN — GABAPENTIN 100 MG: 100 CAPSULE ORAL at 15:55

## 2024-01-17 RX ADMIN — GABAPENTIN 100 MG: 100 CAPSULE ORAL at 22:28

## 2024-01-17 RX ADMIN — GABAPENTIN 100 MG: 100 CAPSULE ORAL at 06:01

## 2024-01-17 RX ADMIN — METOPROLOL TARTRATE 50 MG: 50 TABLET, FILM COATED ORAL at 22:27

## 2024-01-17 RX ADMIN — SODIUM CHLORIDE, PRESERVATIVE FREE 10 ML: 5 INJECTION INTRAVENOUS at 21:00

## 2024-01-17 RX ADMIN — ACETAMINOPHEN 1000 MG: 500 TABLET ORAL at 15:54

## 2024-01-17 RX ADMIN — ACETAMINOPHEN 1000 MG: 500 TABLET ORAL at 22:27

## 2024-01-17 RX ADMIN — SENNOSIDES, DOCUSATE SODIUM 1 TABLET: 8.6; 5 TABLET ORAL at 08:50

## 2024-01-17 RX ADMIN — SENNOSIDES, DOCUSATE SODIUM 1 TABLET: 8.6; 5 TABLET ORAL at 22:28

## 2024-01-17 RX ADMIN — POLYETHYLENE GLYCOL 3350, SODIUM SULFATE ANHYDROUS, SODIUM BICARBONATE, SODIUM CHLORIDE, POTASSIUM CHLORIDE 4000 ML: 236; 22.74; 6.74; 5.86; 2.97 POWDER, FOR SOLUTION ORAL at 18:08

## 2024-01-17 RX ADMIN — ACETAMINOPHEN 1000 MG: 500 TABLET ORAL at 06:01

## 2024-01-17 RX ADMIN — SIMETHICONE 80 MG: 80 TABLET, CHEWABLE ORAL at 22:39

## 2024-01-17 RX ADMIN — OXYCODONE HYDROCHLORIDE 5 MG: 5 TABLET ORAL at 06:01

## 2024-01-17 RX ADMIN — OXYCODONE HYDROCHLORIDE 5 MG: 5 TABLET ORAL at 18:14

## 2024-01-17 RX ADMIN — BE HEALTH MAGNESIUM CITRATE ORAL SOLUTION - LEMON 296 ML: 1.75 LIQUID ORAL at 18:37

## 2024-01-17 RX ADMIN — METOPROLOL TARTRATE 50 MG: 50 TABLET, FILM COATED ORAL at 08:50

## 2024-01-17 RX ADMIN — OXYCODONE HYDROCHLORIDE 5 MG: 5 TABLET ORAL at 12:17

## 2024-01-17 RX ADMIN — OXYCODONE HYDROCHLORIDE 10 MG: 10 TABLET ORAL at 22:28

## 2024-01-17 RX ADMIN — TAMSULOSIN HYDROCHLORIDE 0.4 MG: 0.4 CAPSULE ORAL at 08:50

## 2024-01-17 ASSESSMENT — PAIN DESCRIPTION - DESCRIPTORS
DESCRIPTORS: ACHING;STABBING
DESCRIPTORS: ACHING;STABBING
DESCRIPTORS: ACHING;CRAMPING;CRUSHING
DESCRIPTORS: ACHING
DESCRIPTORS: STABBING

## 2024-01-17 ASSESSMENT — PAIN SCALES - WONG BAKER
WONGBAKER_NUMERICALRESPONSE: 0
WONGBAKER_NUMERICALRESPONSE: 0
WONGBAKER_NUMERICALRESPONSE: 2
WONGBAKER_NUMERICALRESPONSE: 0
WONGBAKER_NUMERICALRESPONSE: 2
WONGBAKER_NUMERICALRESPONSE: 0
WONGBAKER_NUMERICALRESPONSE: 0

## 2024-01-17 ASSESSMENT — PAIN DESCRIPTION - LOCATION
LOCATION: BACK
LOCATION: GENERALIZED
LOCATION: GENERALIZED
LOCATION: GENERALIZED;FOOT
LOCATION: FOOT
LOCATION: GENERALIZED

## 2024-01-17 ASSESSMENT — PAIN SCALES - GENERAL
PAINLEVEL_OUTOF10: 3
PAINLEVEL_OUTOF10: 0
PAINLEVEL_OUTOF10: 3
PAINLEVEL_OUTOF10: 5
PAINLEVEL_OUTOF10: 2
PAINLEVEL_OUTOF10: 4
PAINLEVEL_OUTOF10: 0
PAINLEVEL_OUTOF10: 4
PAINLEVEL_OUTOF10: 8

## 2024-01-17 ASSESSMENT — PAIN DESCRIPTION - ORIENTATION
ORIENTATION: RIGHT;LEFT
ORIENTATION: LEFT

## 2024-01-17 ASSESSMENT — PAIN - FUNCTIONAL ASSESSMENT
PAIN_FUNCTIONAL_ASSESSMENT: PREVENTS OR INTERFERES SOME ACTIVE ACTIVITIES AND ADLS

## 2024-01-17 ASSESSMENT — PAIN DESCRIPTION - FREQUENCY: FREQUENCY: CONTINUOUS

## 2024-01-17 ASSESSMENT — PAIN DESCRIPTION - PAIN TYPE: TYPE: ACUTE PAIN

## 2024-01-17 ASSESSMENT — PAIN DESCRIPTION - ONSET: ONSET: ON-GOING

## 2024-01-17 NOTE — PROGRESS NOTES
Patient Name:  Sebastian Perkins  Patient :  1960  Patient MRN:  [id number]     Primary Oncologist: Jose Rollins MD  Referring Provider: No primary care provider on file.    Sebastian Perkins is a 63 y.o. male with medical history significant for h/o stroke with left sided weakness, presented to [facility] on 24 after he was unable to sit up and fell back in bed.      Patient is limited with his mobility at baseline due to prior stroke which is immobilized his left upper and lower extremities.  Uses a wheelchair at baseline.  Reports back/shoulder pain.      CT abdomen/pelvis on 24 showed metastatic disease of uncertain origin with lytic lesions in the right pubic bone and left iliac wing, several suspicious hepatic lesions, and right hilar lymphadenopathy versus perihilar pulmonary nodules. 2.2 cm indeterminate right adrenal nodule.   Moderate prostatomegaly with moderate urinary bladder distension. Layering calculi within the urinary bladder lumen.1.5 cm indeterminate right renal cystic lesion. Suspected post treatment changes in the superior pole of the left kidney.     I was called to evaluate him. He has lost weight lately. He is a smoker and he is a residence of [facility].     CT Chest 2023 showed compression injuries at T2, T7, destructive mass at T6/T7 with posterior destruction of vertebral body, invasion into spinal canal and epidural space, interstitial nodularity, RLL mass 1.6 x 1.6 cm, L scapular mass measuring 5.9 x 8.2 cm with destructive changes.     Pathology positive for metastatic carcinoma with immunohistochemical stains most consistent with UGI/pancreatic primary however cannot RO lower GI or lung primary.    Interval 24  Pt was seen and examined. Had surgery on 24. He still has weakness in R leg and chronic deficits are present on L. He agreed to have radiation to [facility] procedural bed as well as L scapular mass. We sent biopsy tissue for [employer] panel. EGD 2023 was  negative.  He was unable to tolerate colonoscopy prep however will try again and possibly have tomorrow if able. No pain complaints today.    Today labs showed Cr 0.4, Ca 7.9. WBC 13.4, Hgb 8, plt 434k.     Vital Signs: /67   Pulse (!) 110   Temp 98 °F (36.7 °C) (Oral)   Resp 21   Ht 1.626 m (5' 4\")   Wt 65.9 kg (145 lb 4.5 oz)   SpO2 96%   BMI 24.94 kg/m²      Physical Exam:  CONSTITUTIONAL: awake, alert, cooperative, no apparent distress   EYES: sclera clear, normal conjunctiva  ENT: Normocephalic, without obvious abnormality, atraumatic  NECK: supple, symmetrical, no jugular venous distension,   HEMATOLOGIC/LYMPHATIC: no cervical, supraclavicular or axillary lymphadenopathy   LUNGS: VBS, no wheezes, no increased work of breathing, no rhonchi, clear to auscultation, no crackles,    CARDIOVASCULAR: regular rate and rhythm, normal S1 and S2, no murmur noted  ABDOMEN: normal bowel sounds x 4, soft, non-distended, non-tender, no masses palpated, no hepatosplenomegaly   : Victor draining clear yellow urine  MUSCULOSKELETAL: full range of motion noted, tone is normal  NEUROLOGIC: awake, alert, oriented to name, place and time. Left sided weakness noted in upper and lower extremities, new R sided leg weakness  SKIN: appears intact, normal skin color, normal texture, normal turgor, no jaundice.   EXTREMITIES: no LE edema, no clubbing, no leg swelling, no cyanosis,       Labs:  Hematology:  Lab Results   Component Value Date    WBC 13.0 (H) 01/14/2024    RBC 3.11 (L) 01/14/2024    HGB 8.5 (L) 01/14/2024    HCT 28.6 (L) 01/14/2024    MCV 92.0 01/14/2024    MCH 27.3 01/14/2024    MCHC 29.7 (L) 01/14/2024    RDW 15.9 (H) 01/14/2024     01/14/2024    MPV 10.0 01/14/2024    SEGSPCT 87.0 (H) 01/09/2024    EOSRELPCT 0.0 01/09/2024    BASOPCT 0.1 01/09/2024    LYMPHOPCT 5.8 (L) 01/09/2024    MONOPCT 6.6 (H) 01/09/2024    SEGSABS 11.5 01/09/2024    EOSABS 0.0 01/09/2024    BASOSABS 0.0 01/09/2024    LYMPHSABS

## 2024-01-17 NOTE — PLAN OF CARE
Problem: Discharge Planning  Goal: Discharge to home or other facility with appropriate resources  1/17/2024 1019 by Giovanna Hill RN  Outcome: Progressing  1/17/2024 0141 by Rin Santacruz RN  Outcome: Progressing     Problem: Pain  Goal: Verbalizes/displays adequate comfort level or baseline comfort level  1/17/2024 1019 by Giovanna Hill RN  Outcome: Progressing  1/17/2024 0141 by Rin Santacruz RN  Outcome: Progressing     Problem: Safety - Adult  Goal: Free from fall injury  1/17/2024 1019 by Giovanna Hill RN  Outcome: Progressing  1/17/2024 0141 by Rin Santacruz RN  Outcome: Progressing     Problem: Skin/Tissue Integrity  Goal: Absence of new skin breakdown  Description: 1.  Monitor for areas of redness and/or skin breakdown  2.  Assess vascular access sites hourly  3.  Every 4-6 hours minimum:  Change oxygen saturation probe site  4.  Every 4-6 hours:  If on nasal continuous positive airway pressure, respiratory therapy assess nares and determine need for appliance change or resting period.  1/17/2024 1019 by Giovanna Hill RN  Outcome: Progressing  1/17/2024 0141 by Rin Santacruz RN  Outcome: Progressing     Problem: ABCDS Injury Assessment  Goal: Absence of physical injury  1/17/2024 1019 by Giovanna Hill RN  Outcome: Progressing  1/17/2024 0141 by Rin Santacruz RN  Outcome: Progressing     Problem: Chronic Conditions and Co-morbidities  Goal: Patient's chronic conditions and co-morbidity symptoms are monitored and maintained or improved  1/17/2024 1019 by Giovanna Hill RN  Outcome: Progressing  1/17/2024 0141 by Rin Santacruz RN  Outcome: Progressing     Problem: Nutrition Deficit:  Goal: Optimize nutritional status  1/17/2024 1019 by Giovanna Hill RN  Outcome: Progressing  1/17/2024 0141 by Rin Santacruz RN  Outcome: Progressing

## 2024-01-17 NOTE — PROGRESS NOTES
V2.0    [facility] [place] [facility] Progress Note      Name:  Sebastian Perkins /Age/Sex: 1960  (63 y.o. male)   MRN & CSN:  [id number] & [id number] Encounter Date/Time: 2024 2:02 PM EST   Location:  [facility]0/3110-A PCP: No primary care provider on file.     Attending:Mat Willoughby MD       Hospital Day: 12    Assessment and Recommendations   Sebastian Perkins is a 63 y.o. 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    -CT abdomen/pelvis on admission with lytic lesions in right pubic bone and left iliac wing, several suspicious hepatic lesions and right hilar lymphadenopathy versus perihilar pulmonary nodules.  Also an indeterminate 2.2 cm right adrenal nodule.  There is also a 1.5 cm indeterminate right renal cystic lesion. CT chest showed lung mass, scapula mass and thoracic fractures.  -Oncology onboard-radiation oncology plan radiation therapy for T spine and left scapular mass  -Chemotherapy stimulation 2 weeks from his surgery date (week of )  -GI consulted -status post EGD 2024.  Colonoscopy planned for tomorrow     Back pain likely    Pathological thoracic fractures.   Epidural spinal mass, s/p excision  -CT showed compression injuries at T2 and T7  -MRI showed pathological compression fracture T7 due to metastatis, with extention to epidural space.   -Neurosurgery onboard  -continue pain regimen Percocet q4h  -s/p thoracic fusion and epidural tumor decompression; no further surgical intervention recommended  -pathology report shows potential GI/pancreaticobiliary primary  -PT/OT team on board - recommended SNF - pending precert for [facility]     SIRS 2/4 on admission.   Hypotension likely in the setting of spinal shock  Tachycardia, leukocytosis  wBC 15.9-> 15.0. no sign of infection aside from UTI as below.   -CXR clear on admission  -f/u

## 2024-01-17 NOTE — PROGRESS NOTES
This nurse called [facility] for enema bag but no one answered. Called supervisor, Stefani and stated that she will get it.

## 2024-01-17 NOTE — PROGRESS NOTES
I have seen and examined this patient personally, and independently of Guerita Maldonado PA-C. The plan was developed mutually at the time of the visit with the patient. Guerita and I have spoken with patient/family, nursing staff and provided written and verbal instructions. The above note has been reviewed and I agree with the Assessment,  Diagnosis, and Treatment plan as suggested by Guerita Maldonado. I have also suggested more changes to the therapy plan , which is being implemented.    Pt is doing well. No new complaints.  Patient was apparently eating regular diet and not planning to do more of his prep today.  Vital signs stable.  Exam is stable.     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  Discussed with patient that he will benefit from colonoscopy inpatient if he does prep.  He had solid poop coming out of his anal canal during his last colonoscopy.  - Continue further prep.  Aggressively.  Discussed with nursing staff.  The patient is not able to do the prep may need to do NG tube.  - Keep n.p.o. after midnight except to finish the prep.  - Enema every 4 hours for 3 doses.  - Clear liquid diet.    Hazel Zhou MD  [facility]      [facility]    [facility]  Progress Note  2024  10:22 AM  ___________________________________________________________________________  Patient:    Sebastian Perkins  : 1960   63 y.o.             MRN: [id number]  Admitted: 2024 10:11 AM ATT: Mat Willoughby MD   3110/3110-A  AdmitDx: Weakness [R53.1]  Septicemia (HCC) [A41.9]  Acute cystitis with hematuria [N30.01]  Metastatic malignant neoplasm, unspecified site (HCC) [C79.9]  PCP: No primary care provider on file.  ___________________________________________________________________________  ASSESSMENT  100* 101* 111*         Magnesium:   Lab Results   Component Value Date/Time    MG 2.2 01/17/2024 12:45 AM       Hepatic: No results for input(s): \"AST\", \"ALT\", \"ALB\", \"BILITOT\", \"ALKPHOS\" in the last 72 hours.    INR: No results for input(s): \"INR\" in the last 72 hours.      Intake/Output Summary (Last 24 hours) at 1/17/2024 1022  Last data filed at 1/17/2024 0857  Gross per 24 hour   Intake 240 ml   Output 1250 ml   Net -1010 ml         ___________________________________________________________________________  Medications      Prior to Admission medications    Not on File        Scheduled Medications:    polyethylene glycol  4,000 mL Oral Once    simethicone  80 mg Oral Q6H    ondansetron  4 mg IntraVENous Q6H    metoprolol tartrate  50 mg Oral BID    enoxaparin  40 mg SubCUTAneous Daily    acetaminophen  1,000 mg Oral 3 times per day    gabapentin  100 mg Oral 3 times per day    sennosides-docusate sodium  1 tablet Oral BID    tamsulosin  0.4 mg Oral Daily    sodium chloride flush  5-40 mL IntraVENous 2 times per day     Infusions:    sodium chloride       PRN Medications: oxyCODONE **OR** oxyCODONE, diazePAM, HYDROmorphone, sodium chloride flush, sodium chloride, ondansetron **OR** ondansetron, acetaminophen **OR** acetaminophen    Allergies: No Known Allergies    Guerita Maldonado PA-C,  [facility]   1/17/2024  10:22 AM

## 2024-01-17 NOTE — CONSULTS
[facility] Nurse  Consult Note       Sebastian Perkins  AGE: 63 y.o.   GENDER: male  : 1960  TODAY'S DATE:  2024    Subjective:     Reason for Evaluation and Assessment: skin assessment      Sebastian Perkins is a 63 y.o. male referred by:   [] Physician  [] Nursing  [] Other:     Wound Identification:  Wound Type: skin tear  Contributing Factors: none        PAST MEDICAL HISTORY        Diagnosis Date    Cerebral artery occlusion with cerebral infarction (HCC)        PAST SURGICAL HISTORY    Past Surgical History:   Procedure Laterality Date    LUMBAR SPINE SURGERY N/A 2024    LEFT T6-8 LAMINECTOMY FOR RESECTION OF EXTRADURAL TUMOR, T5-9 PERCUTANEOUS INSTRUMENTATION performed by Nallely Ragland MD at [facility] OR    UPPER GASTROINTESTINAL ENDOSCOPY N/A 2024    EGD BIOPSY performed by Hazel Zhou MD at [facility] ENDOSCOPY       FAMILY HISTORY    History reviewed. No pertinent family history.    SOCIAL HISTORY    Social History     Tobacco Use    Smoking status: Every Day     Current packs/day: 0.25     Types: Cigarettes    Smokeless tobacco: Never   Substance Use Topics    Alcohol use: Not Currently    Drug use: Never       ALLERGIES    No Known Allergies    MEDICATIONS    No current facility-administered medications on file prior to encounter.     No current outpatient medications on file prior to encounter.         Objective:      /61   Pulse 86   Temp 97.5 °F (36.4 °C) (Oral)   Resp 20   Ht 1.626 m (5' 4\")   Wt 65.9 kg (145 lb 4.5 oz)   SpO2 95%   BMI 24.94 kg/m²   Domenico Risk Score: Domenico Scale Score: 12    LABS    CBC:   Lab Results   Component Value Date/Time    WBC 13.0 2024 05:44 AM    RBC 3.11 2024 05:44 AM    HGB 8.5 2024 05:44 AM    HCT 28.6 2024 05:44 AM    MCV 92.0 2024 05:44 AM    MCH 27.3 2024 05:44 AM    MCHC 29.7 2024 05:44 AM    RDW 15.9 2024 05:44 AM     2024 05:44 AM    MPV 10.0 2024 05:44  AM     CMP:    Lab Results   Component Value Date/Time     01/17/2024 12:45 AM    K 4.5 01/17/2024 12:45 AM     01/17/2024 12:45 AM    CO2 23 01/17/2024 12:45 AM    BUN 14 01/17/2024 12:45 AM    CREATININE 0.4 01/17/2024 12:45 AM    GFRAA >60 07/24/2019 10:32 AM    LABGLOM >60 01/17/2024 12:45 AM    GLUCOSE 111 01/17/2024 12:45 AM    PROT 4.9 01/09/2024 06:00 AM    LABALBU 2.9 01/09/2024 06:00 AM    CALCIUM 7.9 01/17/2024 12:45 AM    BILITOT 0.3 01/09/2024 06:00 AM    ALKPHOS 85 01/09/2024 06:00 AM    AST 21 01/09/2024 06:00 AM    ALT 10 01/09/2024 06:00 AM     Albumin:    Lab Results   Component Value Date/Time    LABALBU 2.9 01/09/2024 06:00 AM     PT/INR:    Lab Results   Component Value Date/Time    PROTIME 15.3 01/08/2024 09:32 AM    INR 1.2 01/08/2024 09:32 AM     HgBA1c:    Lab Results   Component Value Date/Time    LABA1C 5.1 01/08/2024 02:30 PM         Assessment:     Patient Active Problem List   Diagnosis    Weakness    Metastatic malignant neoplasm (HCC)    T7 vertebral fracture (HCC)    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     Pain Assessment:  Severity:  none  Quality of pain: none  Wound Pain Timing/Severity: none  Premedicated: none    Plan:     Plan of Care: [REMOVED] Wound 01/11/24 Arm Distal;Left;Lower;Posterior-Dressing/Treatment: Open to air    No open area noted at this time.  Skin intact to left arm. Heels intact elevated off bed by foam roll. Buttock intact, laying toward left side. No distress noted at this time.    Specialty Bed Required :   [] Low Air Loss   [] Pressure Redistribution  [] Fluid Immersion  [] Bariatric  [] Total Pressure Relief  [] Other:     Discharge Plan:  Placement for patient upon discharge: to be determined   Hospice Care: not at this time  Patient appropriate for Outpatient Wound Care  Center: not at this time     Patient/Caregiver Teaching:  Level of patient/caregiver understanding able to:   No questions asked at this time, understands all instructions given       Electronically signed by Cristina Joseph RN,  on 1/17/2024 at 11:46 AM

## 2024-01-17 NOTE — PROGRESS NOTES
Neurosurgery Progress Note  1/17/2024 12:04 PM       Left T6-8 laminectomy, t5-9 percutaneous instrumentation and fusion   POD: 8    Assessment and Plan:   S/P thoracic fusion and epidural tumor decompression- multiple specimens sent to pathology, frozen specimen returning as malignant. Final path report showing concern for GI/pancreaticobiliary primary. RBC 8 yesterday. Incentive spirometer encouraged. Keep patient on sides and turn frequently. Do not lay on back. Patient will need aggressive rehab. Dr. Ragland saw patient over the weekend. Concern for recurrent spinal stenosis causing weakness. Spoke with his sister about our recommendations of no further surgical intervention from the standpoint of the spine and that we recommend hospice. Palliative radiation can still be considered if he has pain in the spine. Today he is not endorsing pain at the surgical site. Discussed with Dr. Rollins yesterday. Prophylactic lovenox on board. Keep garcia in place. Had traumatic insertion of garcia.   Metastatic disease- unknown primary, oncology, Dr. Rollins on board. Have consulted rad once. Will need radiation after incisions heal, ideally 2 weeks post-op. Rad once has seen the patient, appreciate their assistance. Will need to be arranged transport to present the week of 1/22 for radiation.   Hx of right MCA stroke- patient with significant left upper and lower extremity weakness and sensory loss after stroke in [place] several years ago. Has been residing in a nursing facility since    Supervising physician: Nallely Ragland MD.  Dr. Ragland was readily and continuously available by phone for direct consultation regarding the care of this patient.    Dragon dictation may have been used in the writing of this note. Spelling or word errors may have occurred. Please call for clarification if there are concerns.      Subjective:   Admit Date: 1/6/2024  PCP: No primary care provider on file.    Patient sitting up in bed, asks to be

## 2024-01-17 NOTE — PROGRESS NOTES
Physical Therapy    Chart review complete. RN reports pt is appropriate to participate with PT. Upon arrival, pt found resting in bed. Pt began shaking his head \"no\" upon PT introduction/role. Pt reports that he is having pain rated 6/10 from the waist down. He did report receiving pain medication this morning. Despite education and encouragement, pt continued to refuse to participate. PT will re-attempt, if time permits.

## 2024-01-18 ENCOUNTER — ANESTHESIA (OUTPATIENT)
Dept: ENDOSCOPY | Age: 64
End: 2024-01-18
Payer: COMMERCIAL

## 2024-01-18 ENCOUNTER — ANESTHESIA EVENT (OUTPATIENT)
Dept: ENDOSCOPY | Age: 64
End: 2024-01-18
Payer: COMMERCIAL

## 2024-01-18 LAB
ANION GAP SERPL CALCULATED.3IONS-SCNC: 12 MMOL/L (ref 7–16)
BUN SERPL-MCNC: 14 MG/DL (ref 6–23)
CALCIUM SERPL-MCNC: 8.1 MG/DL (ref 8.3–10.6)
CHLORIDE BLD-SCNC: 100 MMOL/L (ref 99–110)
CO2: 25 MMOL/L (ref 21–32)
CREAT SERPL-MCNC: 0.4 MG/DL (ref 0.9–1.3)
GFR SERPL CREATININE-BSD FRML MDRD: >60 ML/MIN/1.73M2
GLUCOSE SERPL-MCNC: 114 MG/DL (ref 70–99)
MAGNESIUM: 2.6 MG/DL (ref 1.8–2.4)
PHOSPHORUS: 4.3 MG/DL (ref 2.5–4.9)
POTASSIUM SERPL-SCNC: 4.5 MMOL/L (ref 3.5–5.1)
SODIUM BLD-SCNC: 137 MMOL/L (ref 135–145)

## 2024-01-18 PROCEDURE — 6370000000 HC RX 637 (ALT 250 FOR IP): Performed by: PHYSICIAN ASSISTANT

## 2024-01-18 PROCEDURE — 36415 COLL VENOUS BLD VENIPUNCTURE: CPT

## 2024-01-18 PROCEDURE — 6370000000 HC RX 637 (ALT 250 FOR IP): Performed by: STUDENT IN AN ORGANIZED HEALTH CARE EDUCATION/TRAINING PROGRAM

## 2024-01-18 PROCEDURE — 83735 ASSAY OF MAGNESIUM: CPT

## 2024-01-18 PROCEDURE — 3700000001 HC ADD 15 MINUTES (ANESTHESIA): Performed by: INTERNAL MEDICINE

## 2024-01-18 PROCEDURE — 97535 SELF CARE MNGMENT TRAINING: CPT

## 2024-01-18 PROCEDURE — 97530 THERAPEUTIC ACTIVITIES: CPT

## 2024-01-18 PROCEDURE — 6370000000 HC RX 637 (ALT 250 FOR IP)

## 2024-01-18 PROCEDURE — 6360000002 HC RX W HCPCS: Performed by: NURSE ANESTHETIST, CERTIFIED REGISTERED

## 2024-01-18 PROCEDURE — 6370000000 HC RX 637 (ALT 250 FOR IP): Performed by: NEUROLOGICAL SURGERY

## 2024-01-18 PROCEDURE — 2580000003 HC RX 258: Performed by: STUDENT IN AN ORGANIZED HEALTH CARE EDUCATION/TRAINING PROGRAM

## 2024-01-18 PROCEDURE — 80048 BASIC METABOLIC PNL TOTAL CA: CPT

## 2024-01-18 PROCEDURE — 84100 ASSAY OF PHOSPHORUS: CPT

## 2024-01-18 PROCEDURE — 97112 NEUROMUSCULAR REEDUCATION: CPT

## 2024-01-18 PROCEDURE — 3700000000 HC ANESTHESIA ATTENDED CARE: Performed by: INTERNAL MEDICINE

## 2024-01-18 PROCEDURE — 3609027000 HC COLONOSCOPY: Performed by: INTERNAL MEDICINE

## 2024-01-18 PROCEDURE — 2140000000 HC CCU INTERMEDIATE R&B

## 2024-01-18 PROCEDURE — 94761 N-INVAS EAR/PLS OXIMETRY MLT: CPT

## 2024-01-18 PROCEDURE — 6360000002 HC RX W HCPCS: Performed by: PHYSICIAN ASSISTANT

## 2024-01-18 PROCEDURE — 97542 WHEELCHAIR MNGMENT TRAINING: CPT

## 2024-01-18 PROCEDURE — 6370000000 HC RX 637 (ALT 250 FOR IP): Performed by: INTERNAL MEDICINE

## 2024-01-18 PROCEDURE — 99232 SBSQ HOSP IP/OBS MODERATE 35: CPT | Performed by: INTERNAL MEDICINE

## 2024-01-18 PROCEDURE — 6360000002 HC RX W HCPCS

## 2024-01-18 PROCEDURE — 2500000003 HC RX 250 WO HCPCS: Performed by: NURSE ANESTHETIST, CERTIFIED REGISTERED

## 2024-01-18 PROCEDURE — 94150 VITAL CAPACITY TEST: CPT

## 2024-01-18 PROCEDURE — 2709999900 HC NON-CHARGEABLE SUPPLY: Performed by: INTERNAL MEDICINE

## 2024-01-18 RX ORDER — LIDOCAINE HYDROCHLORIDE 20 MG/ML
INJECTION, SOLUTION INFILTRATION; PERINEURAL PRN
Status: DISCONTINUED | OUTPATIENT
Start: 2024-01-18 | End: 2024-01-18 | Stop reason: SDUPTHER

## 2024-01-18 RX ORDER — PROPOFOL 10 MG/ML
INJECTION, EMULSION INTRAVENOUS PRN
Status: DISCONTINUED | OUTPATIENT
Start: 2024-01-18 | End: 2024-01-18 | Stop reason: SDUPTHER

## 2024-01-18 RX ADMIN — SIMETHICONE 80 MG: 80 TABLET, CHEWABLE ORAL at 06:44

## 2024-01-18 RX ADMIN — ONDANSETRON 4 MG: 2 INJECTION INTRAMUSCULAR; INTRAVENOUS at 06:44

## 2024-01-18 RX ADMIN — SENNOSIDES, DOCUSATE SODIUM 1 TABLET: 8.6; 5 TABLET ORAL at 20:17

## 2024-01-18 RX ADMIN — SODIUM CHLORIDE, PRESERVATIVE FREE 10 ML: 5 INJECTION INTRAVENOUS at 20:19

## 2024-01-18 RX ADMIN — GABAPENTIN 100 MG: 100 CAPSULE ORAL at 06:44

## 2024-01-18 RX ADMIN — BE HEALTH MAGNESIUM CITRATE ORAL SOLUTION - LEMON 296 ML: 1.75 LIQUID ORAL at 09:38

## 2024-01-18 RX ADMIN — PROPOFOL 40 MG: 10 INJECTION, EMULSION INTRAVENOUS at 13:38

## 2024-01-18 RX ADMIN — ACETAMINOPHEN 1000 MG: 500 TABLET ORAL at 20:16

## 2024-01-18 RX ADMIN — TAMSULOSIN HYDROCHLORIDE 0.4 MG: 0.4 CAPSULE ORAL at 09:37

## 2024-01-18 RX ADMIN — PROPOFOL 20 MG: 10 INJECTION, EMULSION INTRAVENOUS at 13:41

## 2024-01-18 RX ADMIN — OXYCODONE HYDROCHLORIDE 10 MG: 10 TABLET ORAL at 09:36

## 2024-01-18 RX ADMIN — SENNOSIDES, DOCUSATE SODIUM 1 TABLET: 8.6; 5 TABLET ORAL at 09:37

## 2024-01-18 RX ADMIN — GABAPENTIN 100 MG: 100 CAPSULE ORAL at 20:17

## 2024-01-18 RX ADMIN — ACETAMINOPHEN 1000 MG: 500 TABLET ORAL at 06:44

## 2024-01-18 RX ADMIN — LIDOCAINE HYDROCHLORIDE 40 MG: 20 INJECTION, SOLUTION INFILTRATION; PERINEURAL at 13:37

## 2024-01-18 RX ADMIN — GABAPENTIN 100 MG: 100 CAPSULE ORAL at 15:23

## 2024-01-18 RX ADMIN — ENOXAPARIN SODIUM 40 MG: 100 INJECTION SUBCUTANEOUS at 09:37

## 2024-01-18 RX ADMIN — METOPROLOL TARTRATE 50 MG: 50 TABLET, FILM COATED ORAL at 09:37

## 2024-01-18 RX ADMIN — METOPROLOL TARTRATE 50 MG: 50 TABLET, FILM COATED ORAL at 20:17

## 2024-01-18 RX ADMIN — SODIUM CHLORIDE, PRESERVATIVE FREE 10 ML: 5 INJECTION INTRAVENOUS at 09:41

## 2024-01-18 RX ADMIN — OXYCODONE HYDROCHLORIDE 5 MG: 5 TABLET ORAL at 16:35

## 2024-01-18 RX ADMIN — ACETAMINOPHEN 1000 MG: 500 TABLET ORAL at 15:23

## 2024-01-18 ASSESSMENT — PAIN SCALES - GENERAL
PAINLEVEL_OUTOF10: 0
PAINLEVEL_OUTOF10: 3
PAINLEVEL_OUTOF10: 9
PAINLEVEL_OUTOF10: 0
PAINLEVEL_OUTOF10: 0
PAINLEVEL_OUTOF10: 5
PAINLEVEL_OUTOF10: 3

## 2024-01-18 ASSESSMENT — LIFESTYLE VARIABLES: SMOKING_STATUS: 1

## 2024-01-18 ASSESSMENT — PAIN DESCRIPTION - LOCATION
LOCATION: BACK

## 2024-01-18 ASSESSMENT — PAIN DESCRIPTION - DESCRIPTORS
DESCRIPTORS: ACHING
DESCRIPTORS: ACHING;DISCOMFORT
DESCRIPTORS: ACHING

## 2024-01-18 ASSESSMENT — PAIN DESCRIPTION - ORIENTATION
ORIENTATION: RIGHT;LEFT;MID
ORIENTATION: RIGHT;LEFT;MID

## 2024-01-18 ASSESSMENT — PAIN DESCRIPTION - PAIN TYPE: TYPE: CHRONIC PAIN

## 2024-01-18 ASSESSMENT — PAIN - FUNCTIONAL ASSESSMENT
PAIN_FUNCTIONAL_ASSESSMENT: ACTIVITIES ARE NOT PREVENTED
PAIN_FUNCTIONAL_ASSESSMENT: ACTIVITIES ARE NOT PREVENTED

## 2024-01-18 NOTE — PROGRESS NOTES
V2.0    [facility] [place]      Name:  Sebastian Perkins /Age/Sex: 1960  (63 y.o. male)   MRN & CSN:  [id number] & [id number] Encounter Date/Time: 2024 2:02 PM EST   Location:  ENDO/NONE PCP: No primary care provider on file.     Attending:Mat Willoughby MD       Hospital Day: 13    Assessment and Recommendations   Sebastian Perkins is a 63 y.o. 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    -CT abdomen/pelvis on admission with lytic lesions in right pubic bone and left iliac wing, several suspicious hepatic lesions and right hilar lymphadenopathy versus perihilar pulmonary nodules.  Also an indeterminate 2.2 cm right adrenal nodule.  There is also a 1.5 cm indeterminate right renal cystic lesion. CT chest showed lung mass, scapula mass and thoracic fractures.  -Oncology onboard-radiation oncology plan radiation therapy for T spine and left scapular mass  -Chemotherapy stimulation 2 weeks from his surgery date (week of )  -GI consulted -status post EGD 2024.  Colonoscopy planned for today     Back pain likely    Pathological thoracic fractures.   Epidural spinal mass, s/p excision  -CT showed compression injuries at T2 and T7  -MRI showed pathological compression fracture T7 due to metastatis, with extention to epidural space.   -Neurosurgery onboard  -continue pain regimen Percocet q4h  -s/p thoracic fusion and epidural tumor decompression; no further surgical intervention recommended  -pathology report shows potential GI/pancreaticobiliary primary  -PT/OT team on board - recommended SNF - pending precert for [facility]     SIRS 2/4 on admission.   Hypotension likely in the setting of spinal shock  Tachycardia, leukocytosis  wBC 15.9-> 15.0. no sign of infection aside from UTI as below.   -CXR clear on admission  -f/u blood cx  \"TRIG\"  Hemoglobin A1C:   Lab Results   Component Value Date/Time    LABA1C 5.1 01/08/2024 02:30 PM     TSH: No results found for: \"TSH\"  Troponin: No results found for: \"TROPONINT\"  Lactic Acid: No results for input(s): \"LACTA\" in the last 72 hours.  BNP: No results for input(s): \"PROBNP\" in the last 72 hours.  UA:  Lab Results   Component Value Date/Time    NITRU NEGATIVE 01/08/2024 02:45 PM    COLORU ORANGE 01/08/2024 02:45 PM    WBCUA 8 01/08/2024 02:45 PM    RBCUA 3,743 01/08/2024 02:45 PM    MUCUS RARE 01/08/2024 02:45 PM    TRICHOMONAS NONE SEEN 01/08/2024 02:45 PM    BACTERIA NEGATIVE 01/08/2024 02:45 PM    CLARITYU SLIGHTLY CLOUDY 01/08/2024 02:45 PM    SPECGRAV 1.020 01/08/2024 02:45 PM    LEUKOCYTESUR TRACE 01/08/2024 02:45 PM    UROBILINOGEN 4.0 01/08/2024 02:45 PM    BILIRUBINUR SMALL NUMBER OR AMOUNT OBSERVED 01/08/2024 02:45 PM    BLOODU LARGE NUMBER OR AMOUNT OBSERVED 01/08/2024 02:45 PM    KETUA NEGATIVE 01/08/2024 02:45 PM     Urine Cultures: No results found for: \"LABURIN\"  Blood Cultures: No results found for: \"BC\"  No results found for: \"BLOODCULT2\"  Organism: No results found for: \"ORG\"      Electronically signed by Mat Willoughby MD on 1/18/2024 at 12:23 PM

## 2024-01-18 NOTE — PROGRESS NOTES
Occupational Therapy    Occupational Therapy Treatment Note    Name: Sebastian Perkins MRN: [id number] :   1960   Date:  2024   Admission Date: 2024 Room:  ENDO/NONE     Primary Problem:  The primary encounter diagnosis was Septicemia (HCC). Diagnoses of Acute cystitis with hematuria, Metastatic malignant neoplasm, unspecified site (HCC), Generalized weakness, Tumor associated pain, and Chest pain, unspecified type were also pertinent to this visit.     Restrictions/Precautions:           spinal precautions, hemovac, general precautions, fall risk, do not lay on back per neurosx      Communication with other providers: Handoff from HELEN Elena for safety/assist     Subjective:  Patient states:  \"It felt great to get up.\"   Pain:   Location, Type, Intensity (0/10 to 10/10):  9/10 back pain and later reports R shoulder pain, did not rate.     Objective:    Observation: Pt presented in semif-bhat's, agreeable to session.   Objective Measures:  Tele, Vicotr Catheter      Treatment, including education:  Therapeutic Activity Training/Self-Care:   Therapeutic activity training was instructed today.  Cues were given for safety, sequence, UE/LE placement, awareness, and balance.    Activities performed today included bed mobility training, sup-sit, sit-stand, SPT.    Supine to Sit with Max A X2 with cues for technique     Sitting EOB for X10 min with Min-Mod A to correct R lateral leaning with cues to correct posterior LOB.     Pt required DEP assist to don bilat socks while supine in bed, and DEP assist to don/doff L AFO/shoe.     Squat pivot transfer from EOB <> w/c with Max A X2 with B LE's blocked and cues for UE placement. Later pt required Max A X2 for pivot from w/c <> EOB with continued cues to wt shift forward.     Pt sat up in w/c for ~5 min with Max A to propel with use of R UE for X5' and Min A to maintain positioning in w/c to ensure trunk is properly positioned.     Pt required Max A to

## 2024-01-18 NOTE — CARE COORDINATION
Pre-cert received for [facility] and is good through 01/21 per Landy/AV.  They are able to accept pt whenever medically ready.  Dr Willoughby is aware.  D/C INSTRUCTIONS ARE ON FRONT OF PACKET LOCATED WITH THE SOFT CHART.  TE     .Discharging Nurse; Upon discharge pt will be going to [facility], report can be called to [phone], AVS can be faxed to [phone], set up transport via [facility] [phone].

## 2024-01-18 NOTE — PROGRESS NOTES
Physical Therapy  Name: Sebastian Perkins MRN: [id number] :   1960   Date:  2024   Admission Date: 2024 Room:  ENDO/NONE   Restrictions/Precautions:        Spinal precautions, General Precautions, Fall Risk, do not lay on back per neurosx    Communication with other providers:  Db AGUILAR states pt is ok to see for therapy.   COTX with Zofia PETERSON per patient tolerance and safety with rehab.    Discharge recommendation: SNF    Subjective:  Patient states:  \"Can I get in a wheelchair today? I'm sick of this bed\"  Pain:   Location, Type, Intensity (0/10 to 10/10):  910 thoracic back pain and Rt shoulder pain    Objective:    Observation:  Patient is supine in bed, laying on Lt side.     Vitals : Patient is on room air. LLE AFO donned dependently     Treatment, including education/measures:    Transfers with line management of telemonitor, garcia  Rolling: Max A each direction. HOB flat, with cues for sequencing.   Supine to sit :Max A x 2, with HOB elevated. Assist for trunk and BLE assistance management  Seated balance: Patient requires Min-mod A for static sitting. He tolerates sitting on EOB for ~10 minutes, and seated in the w/c for ~5 minutes   Neuro-mercedes: Verbal and tactile cues used to facilitate upright posture. Manual assist needed for trunk adjustment to midline. Patient demos forward and Rt lateral instability. Min A x 1 need for patient to safely sit on EOB and in w/c  Sit to supine :Max A x 2 for trunk and BLE management   Scooting :Seated scooting Mod A x 2  SPT: Squat pivot between EOB and w/c with Max A x 2. BLE blocking and dep w/c set up completed    Wheelchair training:  Patient propels w/c 5 ft with RUE and Max A for Lt side assistance/propulsion and CGA of another to avoid excessive forward lean. He quickly fatigues and needs Max A x 1 to complete 50 ft x 2 in the hallway.    Safety  Patient left safely in the bed, with call light/phone in reach with alarm applied. Gait belt used for

## 2024-01-18 NOTE — ANESTHESIA PRE PROCEDURE
found for: \"PHART\", \"PO2ART\", \"YZV6NHU\", \"FFO4KDA\", \"BEART\", \"L8YHSEXH\"     Type & Screen (If Applicable):  No results found for: \"LABABO\", \"LABRH\"    Drug/Infectious Status (If Applicable):  No results found for: \"HIV\", \"HEPCAB\"    COVID-19 Screening (If Applicable):   Lab Results   Component Value Date/Time    COVID19 NOT DETECTED 01/06/2024 11:31 AM           Anesthesia Evaluation  Patient summary reviewed  Airway: Mallampati: II  TM distance: >3 FB   Neck ROM: full  Mouth opening: > = 3 FB   Dental:    (+) edentulous      Pulmonary: breath sounds clear to auscultation  (+)           current smoker                           Cardiovascular:            Rhythm: regular  Rate: abnormal                    Neuro/Psych:   (+) CVA: residual symptoms             ROS comment: Pt presents w/increased weakness RLE. S/p decompression and fusion. Pt remains w/RLE weakness.   GI/Hepatic/Renal:             Endo/Other:    (+) malignancy/cancer.                  ROS comment: Unchanged pathologic compression fracture at T7 with associated soft tissue  density extending to the central canal and invading the posterior elements  and adjacent posterior rib.     Unchanged left scapular mass.     Known hepatic lesions not well identified.  Unchanged right adrenal lesion.     Unchanged age-indeterminate mild compression deformity at T2.   Abdominal: normal exam            Vascular:          Other Findings:             Anesthesia Plan      MAC     ASA 4     (R:B explained to patient and family to include stroke, sz, MI, and death. They wish to proceed.)  Induction: intravenous.      Anesthetic plan and risks discussed with patient.    Use of blood products discussed with patient whom consented to blood products.    Plan discussed with CRNA.    Attending anesthesiologist reviewed and agrees with Preprocedure content              Bakari Washington MD   1/18/2024

## 2024-01-18 NOTE — ANESTHESIA POSTPROCEDURE EVALUATION
Department of Anesthesiology  Postprocedure Note    Patient: Sebastian Perkins  MRN: [id number]  YOB: 1960  Date of evaluation: 1/18/2024    Procedure Summary       Date: 01/18/24 Room / Location: [place] [address] / [facility]    Anesthesia Start: 1332 Anesthesia Stop: 1349    Procedure: COLONOSCOPY DIAGNOSTIC Diagnosis:       Metastatic malignant neoplasm, unspecified site (HCC)      (Metastatic malignant neoplasm, unspecified site (HCC) [C79.9])    Surgeons: Hazel Zhou MD Responsible Provider: Princess Hastings APRN - CRNA    Anesthesia Type: MAC ASA Status: 4            Anesthesia Type: No value filed.    Ayden Phase I: Ayden Score: 6    Ayden Phase II: Ayden Score: 8    Anesthesia Post Evaluation    Patient location during evaluation: floor  Patient participation: complete - patient participated  Level of consciousness: awake and alert  Pain score: 0  Airway patency: patent  Nausea & Vomiting: no nausea and no vomiting  Cardiovascular status: hemodynamically stable  Respiratory status: room air  Hydration status: euvolemic  Pain management: adequate    No notable events documented.

## 2024-01-19 VITALS
HEART RATE: 126 BPM | BODY MASS INDEX: 24.5 KG/M2 | WEIGHT: 143.52 LBS | HEIGHT: 64 IN | RESPIRATION RATE: 22 BRPM | TEMPERATURE: 97.8 F | OXYGEN SATURATION: 94 % | SYSTOLIC BLOOD PRESSURE: 122 MMHG | DIASTOLIC BLOOD PRESSURE: 75 MMHG

## 2024-01-19 LAB
ANION GAP SERPL CALCULATED.3IONS-SCNC: 9 MMOL/L (ref 7–16)
BUN SERPL-MCNC: 14 MG/DL (ref 6–23)
CALCIUM SERPL-MCNC: 8.3 MG/DL (ref 8.3–10.6)
CHLORIDE BLD-SCNC: 97 MMOL/L (ref 99–110)
CO2: 28 MMOL/L (ref 21–32)
CREAT SERPL-MCNC: 0.5 MG/DL (ref 0.9–1.3)
GFR SERPL CREATININE-BSD FRML MDRD: >60 ML/MIN/1.73M2
GLUCOSE SERPL-MCNC: 98 MG/DL (ref 70–99)
HCT VFR BLD CALC: 30.5 % (ref 42–52)
HEMOGLOBIN: 8.8 GM/DL (ref 13.5–18)
MCH RBC QN AUTO: 28.3 PG (ref 27–31)
MCHC RBC AUTO-ENTMCNC: 28.9 % (ref 32–36)
MCV RBC AUTO: 98.1 FL (ref 78–100)
PDW BLD-RTO: 16.1 % (ref 11.7–14.9)
PLATELET # BLD: 443 K/CU MM (ref 140–440)
PMV BLD AUTO: 9.8 FL (ref 7.5–11.1)
POTASSIUM SERPL-SCNC: 4 MMOL/L (ref 3.5–5.1)
RBC # BLD: 3.11 M/CU MM (ref 4.6–6.2)
SODIUM BLD-SCNC: 134 MMOL/L (ref 135–145)
WBC # BLD: 13.2 K/CU MM (ref 4–10.5)

## 2024-01-19 PROCEDURE — 2580000003 HC RX 258: Performed by: STUDENT IN AN ORGANIZED HEALTH CARE EDUCATION/TRAINING PROGRAM

## 2024-01-19 PROCEDURE — 99232 SBSQ HOSP IP/OBS MODERATE 35: CPT | Performed by: INTERNAL MEDICINE

## 2024-01-19 PROCEDURE — 6370000000 HC RX 637 (ALT 250 FOR IP): Performed by: PHYSICIAN ASSISTANT

## 2024-01-19 PROCEDURE — 6370000000 HC RX 637 (ALT 250 FOR IP): Performed by: INTERNAL MEDICINE

## 2024-01-19 PROCEDURE — 6360000002 HC RX W HCPCS: Performed by: PHYSICIAN ASSISTANT

## 2024-01-19 PROCEDURE — 80048 BASIC METABOLIC PNL TOTAL CA: CPT

## 2024-01-19 PROCEDURE — 6370000000 HC RX 637 (ALT 250 FOR IP): Performed by: STUDENT IN AN ORGANIZED HEALTH CARE EDUCATION/TRAINING PROGRAM

## 2024-01-19 PROCEDURE — 36415 COLL VENOUS BLD VENIPUNCTURE: CPT

## 2024-01-19 PROCEDURE — 85027 COMPLETE CBC AUTOMATED: CPT

## 2024-01-19 RX ORDER — SENNA AND DOCUSATE SODIUM 50; 8.6 MG/1; MG/1
1 TABLET, FILM COATED ORAL 2 TIMES DAILY
Qty: 30 TABLET | Refills: 0 | Status: SHIPPED | OUTPATIENT
Start: 2024-01-19

## 2024-01-19 RX ORDER — ONDANSETRON 4 MG/1
4 TABLET, ORALLY DISINTEGRATING ORAL EVERY 8 HOURS PRN
Qty: 20 TABLET | Refills: 0 | Status: SHIPPED | OUTPATIENT
Start: 2024-01-19

## 2024-01-19 RX ORDER — METOPROLOL TARTRATE 50 MG/1
50 TABLET, FILM COATED ORAL 2 TIMES DAILY
Qty: 60 TABLET | Refills: 3 | Status: SHIPPED | OUTPATIENT
Start: 2024-01-19

## 2024-01-19 RX ORDER — GABAPENTIN 100 MG/1
100 CAPSULE ORAL EVERY 8 HOURS SCHEDULED
Qty: 90 CAPSULE | Refills: 0 | Status: SHIPPED | OUTPATIENT
Start: 2024-01-19 | End: 2024-02-18

## 2024-01-19 RX ORDER — TAMSULOSIN HYDROCHLORIDE 0.4 MG/1
0.4 CAPSULE ORAL DAILY
Qty: 30 CAPSULE | Refills: 3 | Status: SHIPPED | OUTPATIENT
Start: 2024-01-19

## 2024-01-19 RX ORDER — OXYCODONE HYDROCHLORIDE 5 MG/1
5 TABLET ORAL EVERY 6 HOURS PRN
Qty: 12 TABLET | Refills: 0 | Status: ON HOLD | OUTPATIENT
Start: 2024-01-19 | End: 2024-01-26 | Stop reason: HOSPADM

## 2024-01-19 RX ADMIN — TAMSULOSIN HYDROCHLORIDE 0.4 MG: 0.4 CAPSULE ORAL at 08:27

## 2024-01-19 RX ADMIN — GABAPENTIN 100 MG: 100 CAPSULE ORAL at 06:12

## 2024-01-19 RX ADMIN — ENOXAPARIN SODIUM 40 MG: 100 INJECTION SUBCUTANEOUS at 08:27

## 2024-01-19 RX ADMIN — METOPROLOL TARTRATE 50 MG: 50 TABLET, FILM COATED ORAL at 08:27

## 2024-01-19 RX ADMIN — SODIUM CHLORIDE, PRESERVATIVE FREE 10 ML: 5 INJECTION INTRAVENOUS at 08:28

## 2024-01-19 RX ADMIN — ACETAMINOPHEN 1000 MG: 500 TABLET ORAL at 06:12

## 2024-01-19 NOTE — PROGRESS NOTES
Patient Name:  Sebastian Perkins  Patient :  1960  Patient MRN:  [id number]     Primary Oncologist: Jose Rollins MD  Referring Provider: No primary care provider on file.    Sebastian Perkins is a 63 y.o. male with medical history significant for h/o stroke with left sided weakness, presented to [facility] on 24 after he was unable to sit up and fell back in bed.      Patient is limited with his mobility at baseline due to prior stroke which is immobilized his left upper and lower extremities.  Uses a wheelchair at baseline.  Reports back/shoulder pain.      CT abdomen/pelvis on 24 showed metastatic disease of uncertain origin with lytic lesions in the right pubic bone and left iliac wing, several suspicious hepatic lesions, and right hilar lymphadenopathy versus perihilar pulmonary nodules. 2.2 cm indeterminate right adrenal nodule.   Moderate prostatomegaly with moderate urinary bladder distension. Layering calculi within the urinary bladder lumen.1.5 cm indeterminate right renal cystic lesion. Suspected post treatment changes in the superior pole of the left kidney.     I was called to evaluate him. He has lost weight lately. He is a smoker and he is a residence of [facility].     CT Chest 2023 showed compression injuries at T2, T7, destructive mass at T6/T7 with posterior destruction of vertebral body, invasion into spinal canal and epidural space, interstitial nodularity, RLL mass 1.6 x 1.6 cm, L scapular mass measuring 5.9 x 8.2 cm with destructive changes.     Pathology positive for metastatic carcinoma with immunohistochemical stains most consistent with UGI/pancreatic primary however cannot RO lower GI or lung primary.    Interval 24  Pt was seen and examined. Had surgery on 24. He still has weakness in R leg and chronic deficits are present on L. He agreed to have radiation to [facility] procedural bed as well as L scapular mass. We sent biopsy tissue for [employer] panel. EGD 2023 was  at T2 and T7 with destructive mass at level of T6-T7 with destruction of vertebral body and posterior elements as well as invasion into spinal canal and epidural space.     CA 19-9: 4   AFP: 2  PSA 3.33  CEA 31.8    He is s/p surgery on 1/8/24. Pathology was positive for metastatic carcinoma with immunohistochemical stains most consistent with pancreatic/upper GI origin however lung and lower GI cannot be ruled out. We sent biopsy tissue for [employer] GPS panel to determine the primary tumor and to look for  mutation.     EGD 1/16/2023 without concern for primary malignancy.  Had colonoscopy yerterday. No pain complaints today.     I have discussed his case with one of his sister. Will try to meet them in person soon. He can be discharged home from our stand point and we can follow him as an out patient.     Neurosurgery is concerned for recurrent stenosis and recommends hospice discussion as opposed to any further surgical intervention. As of right now wants to pursue definitive diagnosis and discuss options.  Currently plan for radiation to spine and scapula mass ~2 weeks after surgery for healing.    He will need non emergent MRI abdomen to further evaluation his right kidney lesion and adrenal nodule.    Jose Rollins MD

## 2024-01-19 NOTE — CARE COORDINATION
Pre-cert received for [facility] and is good through 01/21 per Landy/AV.  They are able to accept pt whenever medically ready.  Dr Willoughby is aware.  D/C INSTRUCTIONS ARE ON FRONT OF PACKET LOCATED WITH THE SOFT CHART.  TE     .Discharging Nurse; Upon discharge pt will be going to [facility], report can be called to [phone], AVS can be faxed to [phone], set up transport via [facility] [phone].

## 2024-01-19 NOTE — DISCHARGE SUMMARY
V2.0  Discharge Summary    Name:  Sebastian Perkins /Age/Sex: 1960 (63 y.o. male)   Admit Date: 2024  Discharge Date: 24    MRN & CSN:  [id number] & [id number] Encounter Date and Time 24 7:54 AM EST    Attending:  Mat Willoughby MD Discharging Provider: Mat Willoughby MD       Hospital Course:     Brief HPI:   Sebastian Perkins is a 63 y.o. male  patient with past medical history as below, presented to the emergency room for evaluation of Low back pain from nursing home.  admitted on  for metastic disease of  unknown primary, imaging studies revealed T2 and T7 compression fractures, underwent T7 laminectomy with neurosurgery and transferred to the ICU postop.    Arrived extubated, reversed postop, awake following simple commands, spontaneous RUE movement.   hemodynamically stable, MAP goal above 85, was on  phenylephrine drip in the OR  Critical care team consulted for  Comanagement and ICU  care.        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    -CT abdomen/pelvis on admission with lytic lesions in right pubic bone and left iliac wing, several suspicious hepatic lesions and right hilar lymphadenopathy versus perihilar pulmonary nodules.  Also an indeterminate 2.2 cm right adrenal nodule.  There is also a 1.5 cm indeterminate right renal cystic lesion. CT chest showed lung mass, scapula mass and thoracic fractures.  -Oncology onboard-radiation oncology plan radiation therapy for T spine and left scapular mass  -Chemotherapy stimulation 2 weeks from his surgery date (week of )  -GI was consulted -status post EGD 2024.  Status post colonoscopy 2024.  -Discussed with oncology Dr. Rollins who is okay with discharge for patient to follow-up as outpatient     Back pain likely    Pathological thoracic fractures.   Epidural spinal mass, s/p excision  -CT showed compression injuries at T2 and  T7  -MRI showed pathological compression fracture T7 due to metastatis, with extention to epidural space.   -Neurosurgery onboard  -continue pain regimen   -s/p thoracic fusion and epidural tumor decompression; no further surgical intervention recommended  -pathology report shows potential GI/pancreaticobiliary primary  -PT/OT team on board - recommended SNF   -Neurosurgery recommended hospice however patient declining at this time     SIRS 2/4 on admission.   Hypotension likely in the setting of spinal shock, resolved  On presentation tachycardia, leukocytosis  wBC 15.9-> 15.0. no sign of infection aside from UTI as below.   -CXR clear on admission  -f/u blood cx - negative to date, urine cx showing mixed growth  -pro-viviana 0.447  -Completed antibiotics with Rocephin     UTI  Generalized weakness. Denied urinary symptoms has hematuria trauamatic from garcia placement  -compteted Rocephin, urine cx showing mixed growth     Urinary retention. Has garcia placed.   CBI  Urology onboard.  To continue garcia catheter at discharge     Hx of CVA   -residual left sided weakness            The patient expressed appropriate understanding of, and agreement with the discharge recommendations, medications, and plan.     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 within 2 weeks  Diet: regular diet   Activity: activity as tolerated  Disposition: Discharged to:   []Home, []C, [x]SNF, []Acute Rehab, []Hospice   Condition on discharge:  Alert.  Psych: Mood appropriate.         Labs and Imaging   EGD    Result Date: 2024  [facility] Patient: ASTER BRICEÑO MRN: [id number] : 1960 Sex at Birth: Male Age: 63 Years Procedure: Upper GI endoscopy Date: 2024 Attending Physician: SANTA BURTON MD Patient Profile:        -  Refer to note in patient chart for documentation of history and physical.        - Path from T7 c/w metastatic adeno - concern for GI primary Indications:        -  Exclusion of tumor of the GI tract        -  Metastatic malignancy to liver Medications:        -  Monitored Anesthesia Care Complications:        -  No immediate complications. Estimated Blood Loss:        -  Estimated blood loss was minimal. Procedure:        -  Prior to the procedure, a History and Physical was performed, and patient           medications and allergies were reviewed.  The patient is competent.  The risks           and benefits of the procedure and the sedation options and risks were discussed           with the patient.  All questions were answered and informed consent was           obtained.  Patient identification and proposed procedure were verified by the           physician, the nurse, the anesthetist and the technician in the procedure room           in the pre-procedure area.  Mental Status Examination: normal.  Prophylactic           Antibiotics: The patient does not require prophylactic antibiotics.  Prior           Anticoagulants: The patient has taken no anticoagulant or antiplatelet agents.           ASA Grade Assessment: III - A patient with severe systemic disease.  After           reviewing the risks and benefits, the patient was deemed in satisfactory           condition to undergo the procedure.  The anesthesia plan was to use deep           sedation / analgesia.  Immediately prior to administration of medications, the           patient was re-assessed for adequacy to receive sedatives.  The heart rate,

## 2024-01-19 NOTE — PROGRESS NOTES
Report called to JEFF Whatley at [facility].  Transportation set up with [facility] for 10 am.

## 2024-01-19 NOTE — PROGRESS NOTES
Patient Name:  Sebastian Perkins  Patient :  1960  Patient MRN:  [id number]     Primary Oncologist: Jose Rollins MD  Referring Provider: No primary care provider on file.    Sebastian Perkins is a 63 y.o. male with medical history significant for h/o stroke with left sided weakness, presented to [facility] on 24 after he was unable to sit up and fell back in bed.      Patient is limited with his mobility at baseline due to prior stroke which is immobilized his left upper and lower extremities.  Uses a wheelchair at baseline.  Reports back/shoulder pain.      CT abdomen/pelvis on 24 showed metastatic disease of uncertain origin with lytic lesions in the right pubic bone and left iliac wing, several suspicious hepatic lesions, and right hilar lymphadenopathy versus perihilar pulmonary nodules. 2.2 cm indeterminate right adrenal nodule.   Moderate prostatomegaly with moderate urinary bladder distension. Layering calculi within the urinary bladder lumen.1.5 cm indeterminate right renal cystic lesion. Suspected post treatment changes in the superior pole of the left kidney.     I was called to evaluate him. He has lost weight lately. He is a smoker and he is a residence of [facility].     CT Chest 2023 showed compression injuries at T2, T7, destructive mass at T6/T7 with posterior destruction of vertebral body, invasion into spinal canal and epidural space, interstitial nodularity, RLL mass 1.6 x 1.6 cm, L scapular mass measuring 5.9 x 8.2 cm with destructive changes.     Pathology positive for metastatic carcinoma with immunohistochemical stains most consistent with UGI/pancreatic primary however cannot RO lower GI or lung primary.    Interval 24  Pt was seen and examined. Had surgery on 24. He still has weakness in R leg and chronic deficits are present on L. He agreed to have radiation to [facility] procedural bed as well as L scapular mass. We sent biopsy tissue for [employer] panel. EGD 2023 was  negative. He is scheduled for colonoscopy today. No pain complaints today.     I have discussed his case with one of his sister. Will try to meet them in person soon.     Today labs showed Cr 0.4, Ca 8.1. CBC on 1/17/24 showed WBC 13.4, Hgb 8, plt 434k.     Vital Signs: /67   Pulse (!) 109   Temp 97.6 °F (36.4 °C) (Oral)   Resp 14   Ht 1.626 m (5' 4\")   Wt 65.9 kg (145 lb 4.5 oz)   SpO2 100%   BMI 24.94 kg/m²      Physical Exam:  CONSTITUTIONAL: awake, alert, cooperative, no apparent distress   EYES: sclera clear, normal conjunctiva  ENT: Normocephalic, without obvious abnormality, atraumatic  NECK: supple, symmetrical, no jugular venous distension,   HEMATOLOGIC/LYMPHATIC: no cervical, supraclavicular or axillary lymphadenopathy   LUNGS: VBS, no wheezes, no increased work of breathing, no rhonchi, clear to auscultation, no crackles,    CARDIOVASCULAR: regular rate and rhythm, normal S1 and S2, no murmur noted  ABDOMEN: normal bowel sounds x 4, soft, non-distended, non-tender, no masses palpated, no hepatosplenomegaly   : Victor draining clear yellow urine  MUSCULOSKELETAL: full range of motion noted, tone is normal  NEUROLOGIC: awake, alert, oriented to name, place and time. Left sided weakness noted in upper and lower extremities, new R sided leg weakness  SKIN: appears intact, normal skin color, normal texture, normal turgor, no jaundice.   EXTREMITIES: no LE edema, no clubbing, no leg swelling, no cyanosis,       Labs:  Hematology:  Lab Results   Component Value Date    WBC 13.4 (H) 01/17/2024    RBC 2.91 (L) 01/17/2024    HGB 8.0 (L) 01/17/2024    HCT 26.7 (L) 01/17/2024    MCV 91.8 01/17/2024    MCH 27.5 01/17/2024    MCHC 30.0 (L) 01/17/2024    RDW 15.9 (H) 01/17/2024     01/17/2024    MPV 9.9 01/17/2024    SEGSPCT 87.0 (H) 01/09/2024    EOSRELPCT 0.0 01/09/2024    BASOPCT 0.1 01/09/2024    LYMPHOPCT 5.8 (L) 01/09/2024    MONOPCT 6.6 (H) 01/09/2024    SEGSABS 11.5 01/09/2024    EOSABS 0.0  01/09/2024    BASOSABS 0.0 01/09/2024    LYMPHSABS 0.8 01/09/2024    MONOSABS 0.9 01/09/2024    DIFFTYPE AUTOMATED DIFFERENTIAL 01/09/2024     Lab Results   Component Value Date    ESR 82 (H) 01/08/2024     Chemistry:  Lab Results   Component Value Date     01/18/2024    K 4.5 01/18/2024     01/18/2024    CO2 25 01/18/2024    BUN 14 01/18/2024    CREATININE 0.4 (L) 01/18/2024    GLUCOSE 114 (H) 01/18/2024    CALCIUM 8.1 (L) 01/18/2024    PROT 4.9 (L) 01/09/2024    LABALBU 2.9 (L) 01/09/2024    BILITOT 0.3 01/09/2024    ALKPHOS 85 01/09/2024    AST 21 01/09/2024    ALT 10 01/09/2024    LABGLOM >60 01/18/2024    GFRAA >60 07/24/2019    PHOS 4.3 01/18/2024    MG 2.6 (H) 01/18/2024     No results found for: \"MMA\", \"LDH\", \"HOMOCYSTEINE\"  No results found for: \"LD\"  Lab Results   Component Value Date    TSHHS 0.277 01/09/2024     Immunology:  Lab Results   Component Value Date    PROT 4.9 (L) 01/09/2024     No results found for: \"KAPPAUVOL\", \"LAMBDAUVOL\", \"KLFLCR\"  No results found for: \"B2M\"  Coagulation Panel:  Lab Results   Component Value Date    PROTIME 15.3 (H) 01/08/2024    INR 1.2 01/08/2024    APTT 29.7 01/08/2024     Anemia Panel:  No results found for: \"VAKXJOSC08\", \"FOLATE\"  Tumor Markers:  Lab Results   Component Value Date    CEA 31.8 (H) 01/07/2024     4 01/07/2024     Assessment:  Several suspicious liver lesions  Right adrenal nodule  Right renal cystic lesion  Right pubic bone and left iliac wing lytic lesions  Right hilar lesions/lymphadenopathy  Prostatomegaly    Plan:  I reviewed his CT images today. CT findings are concerning for metastatic disease. 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  Discrete mass in RLL 1.6 x 1.6 cm.  Largest mass Is on L scapula measuring 5.9 x 8.2 cm. Also noted  compression injuries at T2 and T7 with destructive mass at level of T6-T7 with destruction of

## 2024-01-19 NOTE — CARE COORDINATION
Transport arranged with [facility].  ETA is 1000.  Notified pt, pt's nurse, pt's sister/Azucena via VM and Landy/Annette via confidential VM.  TE

## 2024-01-21 ENCOUNTER — APPOINTMENT (OUTPATIENT)
Dept: GENERAL RADIOLOGY | Age: 64
End: 2024-01-21
Attending: EMERGENCY MEDICINE
Payer: COMMERCIAL

## 2024-01-21 ENCOUNTER — HOSPITAL ENCOUNTER (INPATIENT)
Age: 64
LOS: 5 days | Discharge: NURSING, REHAB OR LONG-TERM CARE | End: 2024-01-26
Attending: EMERGENCY MEDICINE | Admitting: INTERNAL MEDICINE
Payer: COMMERCIAL

## 2024-01-21 ENCOUNTER — APPOINTMENT (OUTPATIENT)
Dept: CT IMAGING | Age: 64
End: 2024-01-21
Payer: COMMERCIAL

## 2024-01-21 DIAGNOSIS — G89.18 POST-OP PAIN: ICD-10-CM

## 2024-01-21 DIAGNOSIS — R50.9 FEVER, UNSPECIFIED FEVER CAUSE: Primary | ICD-10-CM

## 2024-01-21 DIAGNOSIS — Z85.9 HISTORY OF CANCER: ICD-10-CM

## 2024-01-21 PROBLEM — A41.9 SEPSIS (HCC): Status: ACTIVE | Noted: 2024-01-21

## 2024-01-21 LAB
ALBUMIN SERPL-MCNC: 2.6 GM/DL (ref 3.4–5)
ALP BLD-CCNC: 282 IU/L (ref 40–128)
ALT SERPL-CCNC: 27 U/L (ref 10–40)
ANION GAP SERPL CALCULATED.3IONS-SCNC: 11 MMOL/L (ref 7–16)
AST SERPL-CCNC: 28 IU/L (ref 15–37)
B PARAP IS1001 DNA NPH QL NAA+NON-PROBE: NOT DETECTED
B PERT.PT PRMT NPH QL NAA+NON-PROBE: NOT DETECTED
BACTERIA: NEGATIVE /HPF
BASE EXCESS MIXED: 3.8 (ref 0–3)
BASOPHILS ABSOLUTE: 0 K/CU MM
BASOPHILS RELATIVE PERCENT: 0.2 % (ref 0–1)
BILIRUB SERPL-MCNC: 0.4 MG/DL (ref 0–1)
BILIRUBIN URINE: NEGATIVE MG/DL
BLOOD, URINE: ABNORMAL
BUN SERPL-MCNC: 16 MG/DL (ref 6–23)
C PNEUM DNA NPH QL NAA+NON-PROBE: NOT DETECTED
CALCIUM SERPL-MCNC: 8.7 MG/DL (ref 8.3–10.6)
CHLORIDE BLD-SCNC: 99 MMOL/L (ref 99–110)
CLARITY: CLEAR
CO2: 26 MMOL/L (ref 21–32)
COLOR: YELLOW
COMMENT: ABNORMAL
CREAT SERPL-MCNC: 0.5 MG/DL (ref 0.9–1.3)
CRP SERPL HS-MCNC: 191.5 MG/L
DIFFERENTIAL TYPE: ABNORMAL
EKG ATRIAL RATE: 107 BPM
EKG DIAGNOSIS: NORMAL
EKG P AXIS: 47 DEGREES
EKG P-R INTERVAL: 132 MS
EKG Q-T INTERVAL: 370 MS
EKG QRS DURATION: 82 MS
EKG QTC CALCULATION (BAZETT): 493 MS
EKG R AXIS: 8 DEGREES
EKG T AXIS: 49 DEGREES
EKG VENTRICULAR RATE: 107 BPM
EOSINOPHILS ABSOLUTE: 0 K/CU MM
EOSINOPHILS RELATIVE PERCENT: 0.2 % (ref 0–3)
ERYTHROCYTE SEDIMENTATION RATE: 90 MM/HR (ref 0–20)
FLUAV H1 2009 PAN RNA NPH NAA+NON-PROBE: NOT DETECTED
FLUAV H1 RNA NPH QL NAA+NON-PROBE: NOT DETECTED
FLUAV H3 RNA NPH QL NAA+NON-PROBE: NOT DETECTED
FLUAV RNA NPH QL NAA+NON-PROBE: NOT DETECTED
FLUBV RNA NPH QL NAA+NON-PROBE: NOT DETECTED
GFR SERPL CREATININE-BSD FRML MDRD: >60 ML/MIN/1.73M2
GLUCOSE SERPL-MCNC: 107 MG/DL (ref 70–99)
GLUCOSE, URINE: NEGATIVE MG/DL
HADV DNA NPH QL NAA+NON-PROBE: NOT DETECTED
HCO3 VENOUS: 29.2 MMOL/L (ref 22–29)
HCOV 229E RNA NPH QL NAA+NON-PROBE: NOT DETECTED
HCOV HKU1 RNA NPH QL NAA+NON-PROBE: NOT DETECTED
HCOV NL63 RNA NPH QL NAA+NON-PROBE: NOT DETECTED
HCOV OC43 RNA NPH QL NAA+NON-PROBE: NOT DETECTED
HCT VFR BLD CALC: 27 % (ref 42–52)
HEMOGLOBIN: 7.8 GM/DL (ref 13.5–18)
HMPV RNA NPH QL NAA+NON-PROBE: NOT DETECTED
HPIV1 RNA NPH QL NAA+NON-PROBE: NOT DETECTED
HPIV2 RNA NPH QL NAA+NON-PROBE: NOT DETECTED
HPIV3 RNA NPH QL NAA+NON-PROBE: NOT DETECTED
HPIV4 RNA NPH QL NAA+NON-PROBE: NOT DETECTED
IMMATURE NEUTROPHIL %: 0.5 % (ref 0–0.43)
INFLUENZA A ANTIGEN: NOT DETECTED
INFLUENZA B ANTIGEN: NOT DETECTED
KETONES, URINE: NEGATIVE MG/DL
LACTATE: 1.5 MMOL/L (ref 0.5–1.9)
LACTIC ACID, SEPSIS: 1.2 MMOL/L (ref 0.4–2)
LEUKOCYTE ESTERASE, URINE: NEGATIVE
LIPASE: 14 IU/L (ref 13–60)
LYMPHOCYTES ABSOLUTE: 1.5 K/CU MM
LYMPHOCYTES RELATIVE PERCENT: 8.9 % (ref 24–44)
M PNEUMO DNA NPH QL NAA+NON-PROBE: NOT DETECTED
MCH RBC QN AUTO: 27.3 PG (ref 27–31)
MCHC RBC AUTO-ENTMCNC: 28.9 % (ref 32–36)
MCV RBC AUTO: 94.4 FL (ref 78–100)
MONOCYTES ABSOLUTE: 1.2 K/CU MM
MONOCYTES RELATIVE PERCENT: 7.1 % (ref 0–4)
MUCUS: ABNORMAL HPF
NITRITE URINE, QUANTITATIVE: POSITIVE
NUCLEATED RBC %: 0 %
O2 SAT, VEN: 59.3 % (ref 50–70)
PCO2, VEN: 46 MMHG (ref 41–51)
PDW BLD-RTO: 16.2 % (ref 11.7–14.9)
PH VENOUS: 7.41 (ref 7.32–7.43)
PH, URINE: 7 (ref 5–8)
PLATELET # BLD: 476 K/CU MM (ref 140–440)
PMV BLD AUTO: 9.6 FL (ref 7.5–11.1)
PO2, VEN: 35 MMHG (ref 28–48)
POTASSIUM SERPL-SCNC: 4.6 MMOL/L (ref 3.5–5.1)
PRO-BNP: 144.4 PG/ML
PROCALCITONIN SERPL-MCNC: 0.49 NG/ML
PROTEIN UA: NEGATIVE MG/DL
RBC # BLD: 2.86 M/CU MM (ref 4.6–6.2)
RBC URINE: 30 /HPF (ref 0–3)
RSV RNA NPH QL NAA+NON-PROBE: NOT DETECTED
RV+EV RNA NPH QL NAA+NON-PROBE: NOT DETECTED
SARS-COV-2 RDRP RESP QL NAA+PROBE: NOT DETECTED
SARS-COV-2 RNA NPH QL NAA+NON-PROBE: NOT DETECTED
SEGMENTED NEUTROPHILS ABSOLUTE COUNT: 13.6 K/CU MM
SEGMENTED NEUTROPHILS RELATIVE PERCENT: 83.1 % (ref 36–66)
SODIUM BLD-SCNC: 136 MMOL/L (ref 135–145)
SOURCE: NORMAL
SPECIFIC GRAVITY UA: <1.005 (ref 1–1.03)
TOTAL CK: 41 IU/L (ref 38–174)
TOTAL IMMATURE NEUTOROPHIL: 0.08 K/CU MM
TOTAL NUCLEATED RBC: 0 K/CU MM
TOTAL PROTEIN: 6.5 GM/DL (ref 6.4–8.2)
TRICHOMONAS: ABNORMAL /HPF
TROPONIN, HIGH SENSITIVITY: 43 NG/L (ref 0–22)
TROPONIN, HIGH SENSITIVITY: 58 NG/L (ref 0–22)
TROPONIN, HIGH SENSITIVITY: 62 NG/L (ref 0–22)
TSH SERPL DL<=0.005 MIU/L-ACNC: 0.4 UIU/ML (ref 0.27–4.2)
UROBILINOGEN, URINE: 1 MG/DL (ref 0.2–1)
WBC # BLD: 16.3 K/CU MM (ref 4–10.5)
WBC UA: 4 /HPF (ref 0–2)
YEAST: ABNORMAL /HPF

## 2024-01-21 PROCEDURE — 96375 TX/PRO/DX INJ NEW DRUG ADDON: CPT

## 2024-01-21 PROCEDURE — 36415 COLL VENOUS BLD VENIPUNCTURE: CPT

## 2024-01-21 PROCEDURE — 87186 SC STD MICRODIL/AGAR DIL: CPT

## 2024-01-21 PROCEDURE — 2580000003 HC RX 258: Performed by: INTERNAL MEDICINE

## 2024-01-21 PROCEDURE — 83880 ASSAY OF NATRIURETIC PEPTIDE: CPT

## 2024-01-21 PROCEDURE — 6370000000 HC RX 637 (ALT 250 FOR IP): Performed by: EMERGENCY MEDICINE

## 2024-01-21 PROCEDURE — 6370000000 HC RX 637 (ALT 250 FOR IP): Performed by: INTERNAL MEDICINE

## 2024-01-21 PROCEDURE — 82805 BLOOD GASES W/O2 SATURATION: CPT

## 2024-01-21 PROCEDURE — 84484 ASSAY OF TROPONIN QUANT: CPT

## 2024-01-21 PROCEDURE — 82550 ASSAY OF CK (CPK): CPT

## 2024-01-21 PROCEDURE — 94761 N-INVAS EAR/PLS OXIMETRY MLT: CPT

## 2024-01-21 PROCEDURE — 2060000000 HC ICU INTERMEDIATE R&B

## 2024-01-21 PROCEDURE — 87040 BLOOD CULTURE FOR BACTERIA: CPT

## 2024-01-21 PROCEDURE — 85025 COMPLETE CBC W/AUTO DIFF WBC: CPT

## 2024-01-21 PROCEDURE — 2580000003 HC RX 258: Performed by: EMERGENCY MEDICINE

## 2024-01-21 PROCEDURE — 93005 ELECTROCARDIOGRAM TRACING: CPT | Performed by: EMERGENCY MEDICINE

## 2024-01-21 PROCEDURE — 6360000002 HC RX W HCPCS: Performed by: EMERGENCY MEDICINE

## 2024-01-21 PROCEDURE — 99285 EMERGENCY DEPT VISIT HI MDM: CPT

## 2024-01-21 PROCEDURE — 81001 URINALYSIS AUTO W/SCOPE: CPT

## 2024-01-21 PROCEDURE — 83605 ASSAY OF LACTIC ACID: CPT

## 2024-01-21 PROCEDURE — 93010 ELECTROCARDIOGRAM REPORT: CPT | Performed by: INTERNAL MEDICINE

## 2024-01-21 PROCEDURE — 71045 X-RAY EXAM CHEST 1 VIEW: CPT

## 2024-01-21 PROCEDURE — 83690 ASSAY OF LIPASE: CPT

## 2024-01-21 PROCEDURE — 87635 SARS-COV-2 COVID-19 AMP PRB: CPT

## 2024-01-21 PROCEDURE — 87502 INFLUENZA DNA AMP PROBE: CPT

## 2024-01-21 PROCEDURE — 2700000000 HC OXYGEN THERAPY PER DAY

## 2024-01-21 PROCEDURE — 87077 CULTURE AEROBIC IDENTIFY: CPT

## 2024-01-21 PROCEDURE — 80053 COMPREHEN METABOLIC PANEL: CPT

## 2024-01-21 PROCEDURE — 96365 THER/PROPH/DIAG IV INF INIT: CPT

## 2024-01-21 PROCEDURE — 6360000002 HC RX W HCPCS: Performed by: INTERNAL MEDICINE

## 2024-01-21 PROCEDURE — 87641 MR-STAPH DNA AMP PROBE: CPT

## 2024-01-21 PROCEDURE — 85652 RBC SED RATE AUTOMATED: CPT

## 2024-01-21 PROCEDURE — 86140 C-REACTIVE PROTEIN: CPT

## 2024-01-21 PROCEDURE — 87086 URINE CULTURE/COLONY COUNT: CPT

## 2024-01-21 PROCEDURE — 84145 PROCALCITONIN (PCT): CPT

## 2024-01-21 PROCEDURE — 0202U NFCT DS 22 TRGT SARS-COV-2: CPT

## 2024-01-21 PROCEDURE — 72129 CT CHEST SPINE W/DYE: CPT

## 2024-01-21 PROCEDURE — 6360000004 HC RX CONTRAST MEDICATION: Performed by: EMERGENCY MEDICINE

## 2024-01-21 PROCEDURE — 84443 ASSAY THYROID STIM HORMONE: CPT

## 2024-01-21 RX ORDER — TRAZODONE HYDROCHLORIDE 50 MG/1
50 TABLET ORAL NIGHTLY
Status: DISCONTINUED | OUTPATIENT
Start: 2024-01-21 | End: 2024-01-26 | Stop reason: HOSPADM

## 2024-01-21 RX ORDER — FENTANYL CITRATE 50 UG/ML
25 INJECTION, SOLUTION INTRAMUSCULAR; INTRAVENOUS
Status: DISCONTINUED | OUTPATIENT
Start: 2024-01-21 | End: 2024-01-26 | Stop reason: HOSPADM

## 2024-01-21 RX ORDER — ONDANSETRON 2 MG/ML
4 INJECTION INTRAMUSCULAR; INTRAVENOUS EVERY 30 MIN PRN
Status: DISCONTINUED | OUTPATIENT
Start: 2024-01-21 | End: 2024-01-26 | Stop reason: HOSPADM

## 2024-01-21 RX ORDER — SODIUM CHLORIDE 0.9 % (FLUSH) 0.9 %
5-40 SYRINGE (ML) INJECTION EVERY 12 HOURS SCHEDULED
Status: DISCONTINUED | OUTPATIENT
Start: 2024-01-21 | End: 2024-01-26 | Stop reason: HOSPADM

## 2024-01-21 RX ORDER — 0.9 % SODIUM CHLORIDE 0.9 %
1000 INTRAVENOUS SOLUTION INTRAVENOUS ONCE
Status: COMPLETED | OUTPATIENT
Start: 2024-01-21 | End: 2024-01-21

## 2024-01-21 RX ORDER — 0.9 % SODIUM CHLORIDE 0.9 %
1000 INTRAVENOUS SOLUTION INTRAVENOUS ONCE
Status: DISCONTINUED | OUTPATIENT
Start: 2024-01-21 | End: 2024-01-26 | Stop reason: HOSPADM

## 2024-01-21 RX ORDER — ACETAMINOPHEN 650 MG/1
650 SUPPOSITORY RECTAL EVERY 6 HOURS PRN
Status: DISCONTINUED | OUTPATIENT
Start: 2024-01-21 | End: 2024-01-26 | Stop reason: HOSPADM

## 2024-01-21 RX ORDER — ACETAMINOPHEN 500 MG
1000 TABLET ORAL ONCE
Status: COMPLETED | OUTPATIENT
Start: 2024-01-21 | End: 2024-01-21

## 2024-01-21 RX ORDER — ACETAMINOPHEN 325 MG/1
650 TABLET ORAL EVERY 6 HOURS PRN
Status: DISCONTINUED | OUTPATIENT
Start: 2024-01-21 | End: 2024-01-26 | Stop reason: HOSPADM

## 2024-01-21 RX ORDER — GABAPENTIN 100 MG/1
100 CAPSULE ORAL EVERY 8 HOURS SCHEDULED
Status: DISCONTINUED | OUTPATIENT
Start: 2024-01-21 | End: 2024-01-26 | Stop reason: HOSPADM

## 2024-01-21 RX ORDER — SODIUM CHLORIDE 0.9 % (FLUSH) 0.9 %
5-40 SYRINGE (ML) INJECTION PRN
Status: DISCONTINUED | OUTPATIENT
Start: 2024-01-21 | End: 2024-01-26 | Stop reason: HOSPADM

## 2024-01-21 RX ORDER — SODIUM CHLORIDE 9 MG/ML
INJECTION, SOLUTION INTRAVENOUS PRN
Status: DISCONTINUED | OUTPATIENT
Start: 2024-01-21 | End: 2024-01-26 | Stop reason: HOSPADM

## 2024-01-21 RX ORDER — ENOXAPARIN SODIUM 100 MG/ML
40 INJECTION SUBCUTANEOUS DAILY
Status: DISCONTINUED | OUTPATIENT
Start: 2024-01-21 | End: 2024-01-26 | Stop reason: HOSPADM

## 2024-01-21 RX ORDER — METOPROLOL TARTRATE 50 MG/1
50 TABLET, FILM COATED ORAL 2 TIMES DAILY
Status: DISCONTINUED | OUTPATIENT
Start: 2024-01-21 | End: 2024-01-26 | Stop reason: HOSPADM

## 2024-01-21 RX ORDER — PRAVASTATIN SODIUM 10 MG
20 TABLET ORAL NIGHTLY
Status: DISCONTINUED | OUTPATIENT
Start: 2024-01-21 | End: 2024-01-26 | Stop reason: HOSPADM

## 2024-01-21 RX ORDER — TAMSULOSIN HYDROCHLORIDE 0.4 MG/1
0.4 CAPSULE ORAL DAILY
Status: DISCONTINUED | OUTPATIENT
Start: 2024-01-21 | End: 2024-01-26 | Stop reason: HOSPADM

## 2024-01-21 RX ADMIN — CEFEPIME 2000 MG: 2 INJECTION, POWDER, FOR SOLUTION INTRAVENOUS at 17:03

## 2024-01-21 RX ADMIN — ENOXAPARIN SODIUM 40 MG: 100 INJECTION SUBCUTANEOUS at 16:54

## 2024-01-21 RX ADMIN — PRAVASTATIN SODIUM 20 MG: 10 TABLET ORAL at 20:33

## 2024-01-21 RX ADMIN — GABAPENTIN 100 MG: 100 CAPSULE ORAL at 23:14

## 2024-01-21 RX ADMIN — TRAZODONE HYDROCHLORIDE 50 MG: 50 TABLET ORAL at 20:33

## 2024-01-21 RX ADMIN — SODIUM CHLORIDE 1000 ML: 9 INJECTION, SOLUTION INTRAVENOUS at 12:33

## 2024-01-21 RX ADMIN — METOPROLOL TARTRATE 50 MG: 50 TABLET, FILM COATED ORAL at 20:32

## 2024-01-21 RX ADMIN — SODIUM CHLORIDE, PRESERVATIVE FREE 10 ML: 5 INJECTION INTRAVENOUS at 20:34

## 2024-01-21 RX ADMIN — AZITHROMYCIN DIHYDRATE 500 MG: 500 INJECTION, POWDER, LYOPHILIZED, FOR SOLUTION INTRAVENOUS at 17:00

## 2024-01-21 RX ADMIN — ONDANSETRON 4 MG: 2 INJECTION INTRAMUSCULAR; INTRAVENOUS at 12:38

## 2024-01-21 RX ADMIN — IOPAMIDOL 75 ML: 755 INJECTION, SOLUTION INTRAVENOUS at 12:07

## 2024-01-21 RX ADMIN — ACETAMINOPHEN 1000 MG: 500 TABLET ORAL at 12:40

## 2024-01-21 RX ADMIN — CEFEPIME 2000 MG: 2 INJECTION, POWDER, FOR SOLUTION INTRAVENOUS at 12:33

## 2024-01-21 RX ADMIN — TAMSULOSIN HYDROCHLORIDE 0.4 MG: 0.4 CAPSULE ORAL at 20:33

## 2024-01-21 RX ADMIN — FENTANYL CITRATE 25 MCG: 50 INJECTION, SOLUTION INTRAMUSCULAR; INTRAVENOUS at 12:38

## 2024-01-21 RX ADMIN — SERTRALINE HYDROCHLORIDE 50 MG: 50 TABLET ORAL at 20:33

## 2024-01-21 RX ADMIN — CEFEPIME 2000 MG: 2 INJECTION, POWDER, FOR SOLUTION INTRAVENOUS at 23:20

## 2024-01-21 RX ADMIN — VANCOMYCIN HYDROCHLORIDE 2000 MG: 5 INJECTION, POWDER, LYOPHILIZED, FOR SOLUTION INTRAVENOUS at 15:24

## 2024-01-21 ASSESSMENT — PAIN - FUNCTIONAL ASSESSMENT
PAIN_FUNCTIONAL_ASSESSMENT: NONE - DENIES PAIN
PAIN_FUNCTIONAL_ASSESSMENT: ACTIVITIES ARE NOT PREVENTED

## 2024-01-21 ASSESSMENT — PAIN DESCRIPTION - DESCRIPTORS
DESCRIPTORS: ACHING
DESCRIPTORS: ACHING

## 2024-01-21 ASSESSMENT — PAIN DESCRIPTION - ORIENTATION
ORIENTATION: RIGHT
ORIENTATION: LEFT

## 2024-01-21 ASSESSMENT — PAIN DESCRIPTION - ONSET: ONSET: ON-GOING

## 2024-01-21 ASSESSMENT — PAIN SCALES - WONG BAKER
WONGBAKER_NUMERICALRESPONSE: 0
WONGBAKER_NUMERICALRESPONSE: 0

## 2024-01-21 ASSESSMENT — PAIN DESCRIPTION - PAIN TYPE: TYPE: SURGICAL PAIN

## 2024-01-21 ASSESSMENT — PAIN SCALES - GENERAL
PAINLEVEL_OUTOF10: 9
PAINLEVEL_OUTOF10: 6
PAINLEVEL_OUTOF10: 0

## 2024-01-21 ASSESSMENT — PAIN DESCRIPTION - LOCATION
LOCATION: SHOULDER
LOCATION: SHOULDER

## 2024-01-21 ASSESSMENT — PAIN DESCRIPTION - FREQUENCY: FREQUENCY: CONTINUOUS

## 2024-01-21 NOTE — ED PROVIDER NOTES
[facility]      TRIAGE CHIEF COMPLAINT:   Fever and Hypotension      Jena:  Sebastian Perkins is a 63 y.o. male that presents by EMS from nursing home with complaint of fever low blood pressure.  Patient states he does have back surgery here he has metastatic cancer.  Does have back surgery and recently discharged now he is having fevers and malaise fatigue generalized weakness and back pain.  No headache or chest pain or shortness of breath or cough or abdominal pain.  Has chronic weakness.  No other questions or concerns on arrival he is not hypotensive he is not febrile..    REVIEW OF SYSTEMS:  At least 10 systems reviewed and otherwise acutely negative except as in the [place].    Review of Systems   Constitutional:  Positive for activity change, appetite change, chills and fatigue.   HENT: Negative.     Eyes: Negative.    Respiratory: Negative.     Cardiovascular: Negative.    Gastrointestinal: Negative.    Endocrine: Negative.    Genitourinary: Negative.    Musculoskeletal:  Positive for arthralgias, back pain and myalgias.   Skin:  Positive for wound.   Allergic/Immunologic: Negative.    Neurological:  Positive for weakness.   Hematological: Negative.    Psychiatric/Behavioral: Negative.     All other systems reviewed and are negative.      Past Medical History:   Diagnosis Date    Cerebral artery occlusion with cerebral infarction (HCC)     Metastatic malignant neoplasm (HCC)     Septicemia (HCC)      Past Surgical History:   Procedure Laterality Date    LUMBAR SPINE SURGERY N/A 1/8/2024    LEFT T6-8 LAMINECTOMY FOR RESECTION OF EXTRADURAL TUMOR, T5-9 PERCUTANEOUS INSTRUMENTATION performed by Nallely Ragland MD at [facility] OR    UPPER GASTROINTESTINAL ENDOSCOPY N/A 1/16/2024    EGD BIOPSY performed by Hazel Zhou MD at [facility] ENDOSCOPY     No family history on file.  Social History     Socioeconomic History    Marital status:      Spouse name: Not on file    Number of children: Not

## 2024-01-21 NOTE — PLAN OF CARE
Problem: Safety - Adult  Goal: Free from fall injury  Outcome: Progressing     Problem: Discharge Planning  Goal: Discharge to home or other facility with appropriate resources  Outcome: Progressing     Problem: Pain  Goal: Verbalizes/displays adequate comfort level or baseline comfort level  Outcome: Progressing     Problem: Infection - Adult  Goal: Absence of infection at discharge  Outcome: Progressing  Goal: Absence of infection during hospitalization  Outcome: Progressing  Goal: Absence of fever/infection during anticipated neutropenic period  Outcome: Progressing     Problem: Hematologic - Adult  Goal: Maintains hematologic stability  Outcome: Progressing     Problem: Skin/Tissue Integrity  Goal: Absence of new skin breakdown  Description: 1.  Monitor for areas of redness and/or skin breakdown  2.  Assess vascular access sites hourly  3.  Every 4-6 hours minimum:  Change oxygen saturation probe site  4.  Every 4-6 hours:  If on nasal continuous positive airway pressure, respiratory therapy assess nares and determine need for appliance change or resting period.  Outcome: Progressing     Problem: ABCDS Injury Assessment  Goal: Absence of physical injury  Outcome: Progressing

## 2024-01-21 NOTE — PROGRESS NOTES
Patient presents with fever.    CT thoracic today reviewed.  No complication noted.  No further neurosurgical intervention anticipated.    Recommend blood cx, UA with cx, CXR if not already done.  MRI thoracic spine with and without contrast may be considered if other cultures negative.

## 2024-01-21 NOTE — ACP (ADVANCE CARE PLANNING)
Patient does not have any ACP documents/Medical Power of .     LSW notes hospital will follow [place]'s Next of Kin hierarchy in the following descending order for priority:    Guardian  Spouse  Majority of adult Children  Parents  Majority of adult Siblings  Nearest Relative not described above    Per [place] Next of Kin hierarchy: Patients' Brother/Sister will be Primary Healthcare Decision Maker.

## 2024-01-21 NOTE — ED TRIAGE NOTES
Pt to the ED via EMS from [facility]. Per EMS, they were called out for low vital signs and a fever. Pt does have newly diagnosed metastatic cancer. Pt arrives with a temp of 98.5, BP 98/69.

## 2024-01-21 NOTE — ED NOTES
ED TO INPATIENT SBAR HANDOFF    Patient Name: Sebastian Perkins   :  1960  63 y.o.   Preferred Name  William  Family/Caregiver Present no   Restraints no   C-SSRS: Risk of Suicide: No Risk  Sitter no   Sepsis Risk Score Sepsis Risk Score: 2.73      Situation  Chief Complaint   Patient presents with    Fever    Hypotension     Brief Description of Patient's Condition: Pt came to the ED as a sepsis workup. Pt got cultures, antibiotics and fluids in the ED. Pt has new metastatic cancer and they removed skin on his upper back recently due to this. Pt also came in with a garcia.   Mental Status: oriented, alert, coherent, and logical  Arrived from: nursing home    Imaging:   CT THORACIC SPINE W CONTRAST   Final Result   Interval posterior thoracic fusion from T5 through T9 for stabilization of a   pathologic fracture at T7 which demonstrates loss of 70% of vertebral body   height.  Metastasis identified within the left T7 pedicle which extends into   the adjacent rib as well as the left ventral epidural space.  The degree of   cord compression cannot be adequately assessed by CT.      No significant abnormal fluid collection is appreciated.      2.2 cm right adrenal mass that is most likely benign.  Adrenal washout CT   examination or MRI examination is suggested for further evaluation to exclude   metastasis.      Very large left posterior upper back enhancing mass measuring 9.1 x 8.2 x 6.6   cm.  The mass encases the body of the scapula and is compatible with a large   metastasis.         XR CHEST PORTABLE   Final Result   No evidence of acute pneumonia.           Abnormal labs:   Abnormal Labs Reviewed   CBC WITH AUTO DIFFERENTIAL - Abnormal; Notable for the following components:       Result Value    WBC 16.3 (*)     RBC 2.86 (*)     Hemoglobin 7.8 (*)     Hematocrit 27.0 (*)     MCHC 28.9 (*)     RDW 16.2 (*)     Platelets 476 (*)     Segs Relative 83.1 (*)     Lymphocytes % 8.9 (*)     Monocytes % 7.1 (*)      administered: 1238  NIH Score: NIH     Active LDA's:   Peripheral IV 01/21/24 Proximal;Right;Anterior Forearm (Active)       Pertinent or High Risk Medications/Drips: no   If Yes, please provide details:   Blood Product Administration: no  If Yes, provide details:     Recommendation    Incomplete orders Inpt  Additional Comments:    If any further questions, please call Sending RN at [phone]    Electronically signed by: Electronically signed by Justyna Rodriguez RN on 1/21/2024 at 3:17 PM

## 2024-01-21 NOTE — H&P
V2.0  History and Physical      Name:  Sebastian Perkins /Age/Sex: 1960  (63 y.o. male)   MRN & CSN:  [id number] & [id number] Encounter Date/Time: 2024 1:37 PM EST   Location:  ED16/ED-16 PCP: Vidal Lynch MD       Hospital Day: 1    Assessment and Plan:   Sebastian Perkins is a 63 y.o. male who presents with Sepsis (HCC)    Hospital Problems             Last Modified POA    * (Principal) Sepsis (HCC) 2024 Yes     # Sepsis 2/2 possible UTI  # Diarrhea  -Presented with fever of 101 at the nursing home, has been afebrile since being here  -SIRS criteria: Tachycardia, hypotension, leukocytosis  -Patient with recent laminectomy  Rapid flu and COVID test were negative  -Negative respiratory PCR panel  -UA positive for nitrites and moderate blood  -Procalcitonin mildly elevated at 0.493  -CT T-spine showing posterior thoracic fusion from T5-T9 for stabilization of pathologic fracture at T7  -Recently completed course of ceftriaxone for UTI on previous admission  Follow-up on blood cultures and urine culture  Continue on cefepime and vancomycin  GI PCR panel and C. difficile test ordered  MRSA screen    # Acute on chronic anemia  -Hemoglobin 7.8, previously 8.8 2 days ago  Monitor CBC daily, transfuse if hemoglobin less than    # Elevated troponin likely in the setting  -Troponin: 43-->62  Continue trending troponin    # Cancer of unknown primary  -Recently underwent laminectomy due to spinal metastases and pathologic fracture  -CT scan showing atelectasis within the left T7 pedicle extends into adjacent rib as well as loss of central epidural space 2.2 cm right adrenal mass, most likely benign, very large left posterior back enhancing mass measuring 9.1 x 3.2 x 6.6 cm, encases body of the scapula and compatible with large met  Neurosurgery on consult, recommended holding off on MRI for now per ED provider  Adrenal washout CT exam or MRI exam to exclude adrenal metastasis    # History of CVA with residual  01/21/24  1141   WBC 13.2* 16.3*   HGB 8.8* 7.8*   * 476*     BMP:    Recent Labs     01/19/24  1006 01/21/24  1141   * 136   K 4.0 4.6   CL 97* 99   CO2 28 26   BUN 14 16   CREATININE 0.5* 0.5*   GLUCOSE 98 107*     Hepatic:   Recent Labs     01/21/24  1141   AST 28   ALT 27   BILITOT 0.4   ALKPHOS 282*     Lipids: No results found for: \"CHOL\", \"HDL\", \"TRIG\"  Hemoglobin A1C:   Lab Results   Component Value Date/Time    LABA1C 5.1 01/08/2024 02:30 PM     TSH: No results found for: \"TSH\"  Troponin: No results found for: \"TROPONINT\"  Lactic Acid: No results for input(s): \"LACTA\" in the last 72 hours.  BNP:   Recent Labs     01/21/24  1141   PROBNP 144.4     UA:  Lab Results   Component Value Date/Time    NITRU NEGATIVE 01/08/2024 02:45 PM    COLORU ORANGE 01/08/2024 02:45 PM    WBCUA 8 01/08/2024 02:45 PM    RBCUA 3,743 01/08/2024 02:45 PM    MUCUS RARE 01/08/2024 02:45 PM    TRICHOMONAS NONE SEEN 01/08/2024 02:45 PM    BACTERIA NEGATIVE 01/08/2024 02:45 PM    CLARITYU SLIGHTLY CLOUDY 01/08/2024 02:45 PM    SPECGRAV 1.020 01/08/2024 02:45 PM    LEUKOCYTESUR TRACE 01/08/2024 02:45 PM    UROBILINOGEN 4.0 01/08/2024 02:45 PM    BILIRUBINUR SMALL NUMBER OR AMOUNT OBSERVED 01/08/2024 02:45 PM    BLOODU LARGE NUMBER OR AMOUNT OBSERVED 01/08/2024 02:45 PM    KETUA NEGATIVE 01/08/2024 02:45 PM     Urine Cultures: No results found for: \"LABURIN\"  Blood Cultures: No results found for: \"BC\"  No results found for: \"BLOODCULT2\"  Organism: No results found for: \"ORG\"    Imaging/Diagnostics Last 24 Hours   CT THORACIC SPINE W CONTRAST    Result Date: 1/21/2024  EXAMINATION: CT OF THE THORACIC SPINE WITH CONTRAST  1/21/2024 11:53 am: TECHNIQUE: CT of the thoracic spine was performed with the administration of intravenous contrast.  Multiplanar reformatted images are provided for review. Automated exposure control, iterative reconstruction, and/or weight based adjustment of the mA/kV was utilized to reduce the radiation

## 2024-01-21 NOTE — PROGRESS NOTES
4 Eyes Skin Assessment     NAME:  Sebastian Perkins  YOB: 1960  MEDICAL RECORD NUMBER:  [id number]    The patient is being assessed for  Admission    I agree that at least one RN has performed a thorough Head to Toe Skin Assessment on the patient. ALL assessment sites listed below have been assessed.      Areas assessed by both nurses:    Head, Face, Ears, Shoulders, Back, Chest, Arms, Elbows, Hands, Sacrum. Buttock, Coccyx, Ischium, Legs. Feet and Heels, and Under Medical Devices         Does the Patient have a Wound? Yes wound(s) were present on assessment. LDA wound assessment was Initiated and completed by RN       Domenico Prevention initiated by RN: Yes  Wound Care Orders initiated by RN: Yes    Pressure Injury (Stage 3,4, Unstageable, DTI, NWPT, and Complex wounds) if present, place Wound referral order by RN under : No    New Ostomies, if present place, Ostomy referral order under : No     Nurse 1 eSignature: Electronically signed by Susan Burgos RN on 1/21/24 at 6:06 PM EST    **SHARE this note so that the co-signing nurse can place an eSignature**    Nurse 2 eSignature: Electronically signed by Cassandra Loyd RN on 1/21/24 at 6:31 PM EST

## 2024-01-21 NOTE — CARE COORDINATION
CM - Room # ED-16-MCG criteria for Sepsis  reviewed at this time, criteria supports the INPATIENT admission

## 2024-01-21 NOTE — PROGRESS NOTES
PHARMACY VANCOMYCIN MONITORING SERVICE  Pharmacy consulted by Dr. Thayer for monitoring and adjustment.    Indication for treatment: Sepsis of Unknown Etiology  Goal trough: Trough Goal: 15-20 mcg/mL  AUC/STEPH: 400-600    Risk Factors for MRSA Identified:   Hospitalization within the past 90 days, Received IV antibiotics within the past 90 days    Pertinent Laboratory Values:   Temp Readings from Last 3 Encounters:   01/21/24 98.3 °F (36.8 °C) (Oral)   01/19/24 97.8 °F (36.6 °C) (Axillary)   07/24/19 98.5 °F (36.9 °C)     Recent Labs     01/19/24  1006 01/21/24  1141   WBC 13.2* 16.3*   LACTATE  --  1.5     Recent Labs     01/19/24  1006 01/21/24  1141   BUN 14 16   CREATININE 0.5* 0.5*     Estimated Creatinine Clearance: 125 mL/min (A) (based on SCr of 0.5 mg/dL (L)).  No intake or output data in the 24 hours ending 01/21/24 1636    Pertinent Cultures:   Date    Source    Results  01/21/24  Blood    In process  01/21/24  COVID-19/Influenza  Negative  01/21/24  MRSA Nasal   To be collected  01/21/24  Respiratory (Sputum)  To be collected  01/21/24  Urine    In process    Vancomycin level:   TROUGH:  No results for input(s): \"VANCOTROUGH\" in the last 72 hours.  RANDOM:  No results for input(s): \"VANCORANDOM\" in the last 72 hours.    Assessment:  HPI: 63 year old male admitted 01/21/24 for sepsis. Sent from [facility] via EMS for \"low vital signs and a fever\". Recently admitted 01/06/24-01/19/24 during which time he was treated with ceftriaxone for SIRS and UTI. Also underwent spinal surgery 1/8/24 for thoracic fusion and epidural tumor decompression. On presentation patient was afebrile with hypotension and elevated WBC, CRP, and ESR. CXR without evidence of acute pneumonia.  SCr, BUN, and urine output:   SCr at baseline of 0.4-0.5 mg/dL  BUN within normal limits  No urine output data available  Day(s) of therapy: 1 of 7  Vancomycin concentration:  1/23/24 - to be collected    Plan:  Received vancomycin 2000 mg IV once

## 2024-01-22 LAB
ANION GAP SERPL CALCULATED.3IONS-SCNC: 11 MMOL/L (ref 7–16)
BASOPHILS ABSOLUTE: 0.1 K/CU MM
BASOPHILS RELATIVE PERCENT: 0.4 % (ref 0–1)
BUN SERPL-MCNC: 15 MG/DL (ref 6–23)
CALCIUM SERPL-MCNC: 7.9 MG/DL (ref 8.3–10.6)
CHLORIDE BLD-SCNC: 106 MMOL/L (ref 99–110)
CO2: 22 MMOL/L (ref 21–32)
CREAT SERPL-MCNC: 0.4 MG/DL (ref 0.9–1.3)
DIFFERENTIAL TYPE: ABNORMAL
EOSINOPHILS ABSOLUTE: 0.1 K/CU MM
EOSINOPHILS RELATIVE PERCENT: 0.5 % (ref 0–3)
GFR SERPL CREATININE-BSD FRML MDRD: >60 ML/MIN/1.73M2
GLUCOSE SERPL-MCNC: 111 MG/DL (ref 70–99)
HCT VFR BLD CALC: 30.4 % (ref 42–52)
HEMOGLOBIN: 8 GM/DL (ref 13.5–18)
IMMATURE NEUTROPHIL %: 0.5 % (ref 0–0.43)
LACTIC ACID, SEPSIS: 1.3 MMOL/L (ref 0.4–2)
LYMPHOCYTES ABSOLUTE: 1.5 K/CU MM
LYMPHOCYTES RELATIVE PERCENT: 10.9 % (ref 24–44)
MCH RBC QN AUTO: 27.7 PG (ref 27–31)
MCHC RBC AUTO-ENTMCNC: 26.3 % (ref 32–36)
MCV RBC AUTO: 105.2 FL (ref 78–100)
MONOCYTES ABSOLUTE: 0.7 K/CU MM
MONOCYTES RELATIVE PERCENT: 5.5 % (ref 0–4)
MRSA, DNA, NASAL: NEGATIVE
NUCLEATED RBC %: 0 %
PDW BLD-RTO: 16.2 % (ref 11.7–14.9)
PLATELET # BLD: 438 K/CU MM (ref 140–440)
PMV BLD AUTO: 9.7 FL (ref 7.5–11.1)
POTASSIUM SERPL-SCNC: 3.9 MMOL/L (ref 3.5–5.1)
RBC # BLD: 2.89 M/CU MM (ref 4.6–6.2)
SEGMENTED NEUTROPHILS ABSOLUTE COUNT: 11 K/CU MM
SEGMENTED NEUTROPHILS RELATIVE PERCENT: 82.2 % (ref 36–66)
SODIUM BLD-SCNC: 139 MMOL/L (ref 135–145)
SPECIMEN DESCRIPTION: NORMAL
TOTAL IMMATURE NEUTOROPHIL: 0.07 K/CU MM
TOTAL NUCLEATED RBC: 0 K/CU MM
WBC # BLD: 13.4 K/CU MM (ref 4–10.5)

## 2024-01-22 PROCEDURE — 2580000003 HC RX 258: Performed by: INTERNAL MEDICINE

## 2024-01-22 PROCEDURE — 80048 BASIC METABOLIC PNL TOTAL CA: CPT

## 2024-01-22 PROCEDURE — 85025 COMPLETE CBC W/AUTO DIFF WBC: CPT

## 2024-01-22 PROCEDURE — 6360000002 HC RX W HCPCS: Performed by: INTERNAL MEDICINE

## 2024-01-22 PROCEDURE — 6370000000 HC RX 637 (ALT 250 FOR IP): Performed by: INTERNAL MEDICINE

## 2024-01-22 PROCEDURE — 83605 ASSAY OF LACTIC ACID: CPT

## 2024-01-22 PROCEDURE — 36415 COLL VENOUS BLD VENIPUNCTURE: CPT

## 2024-01-22 PROCEDURE — 2060000000 HC ICU INTERMEDIATE R&B

## 2024-01-22 PROCEDURE — 94761 N-INVAS EAR/PLS OXIMETRY MLT: CPT

## 2024-01-22 PROCEDURE — 6370000000 HC RX 637 (ALT 250 FOR IP): Performed by: NURSE PRACTITIONER

## 2024-01-22 PROCEDURE — 99024 POSTOP FOLLOW-UP VISIT: CPT | Performed by: PHYSICIAN ASSISTANT

## 2024-01-22 RX ORDER — OXYCODONE HYDROCHLORIDE 5 MG/1
5 TABLET ORAL EVERY 4 HOURS PRN
Status: COMPLETED | OUTPATIENT
Start: 2024-01-22 | End: 2024-01-22

## 2024-01-22 RX ADMIN — PRAVASTATIN SODIUM 20 MG: 10 TABLET ORAL at 20:05

## 2024-01-22 RX ADMIN — ENOXAPARIN SODIUM 40 MG: 100 INJECTION SUBCUTANEOUS at 07:47

## 2024-01-22 RX ADMIN — TAMSULOSIN HYDROCHLORIDE 0.4 MG: 0.4 CAPSULE ORAL at 07:47

## 2024-01-22 RX ADMIN — GABAPENTIN 100 MG: 100 CAPSULE ORAL at 15:21

## 2024-01-22 RX ADMIN — METOPROLOL TARTRATE 50 MG: 50 TABLET, FILM COATED ORAL at 07:48

## 2024-01-22 RX ADMIN — AZITHROMYCIN DIHYDRATE 500 MG: 500 INJECTION, POWDER, LYOPHILIZED, FOR SOLUTION INTRAVENOUS at 15:32

## 2024-01-22 RX ADMIN — TRAZODONE HYDROCHLORIDE 50 MG: 50 TABLET ORAL at 20:05

## 2024-01-22 RX ADMIN — VANCOMYCIN HYDROCHLORIDE 1250 MG: 1.25 INJECTION, POWDER, LYOPHILIZED, FOR SOLUTION INTRAVENOUS at 05:26

## 2024-01-22 RX ADMIN — VANCOMYCIN HYDROCHLORIDE 1250 MG: 1.25 INJECTION, POWDER, LYOPHILIZED, FOR SOLUTION INTRAVENOUS at 20:12

## 2024-01-22 RX ADMIN — GABAPENTIN 100 MG: 100 CAPSULE ORAL at 22:45

## 2024-01-22 RX ADMIN — METOPROLOL TARTRATE 50 MG: 50 TABLET, FILM COATED ORAL at 20:05

## 2024-01-22 RX ADMIN — CEFEPIME 2000 MG: 2 INJECTION, POWDER, FOR SOLUTION INTRAVENOUS at 22:45

## 2024-01-22 RX ADMIN — OXYCODONE HYDROCHLORIDE 5 MG: 5 TABLET ORAL at 12:35

## 2024-01-22 RX ADMIN — CEFEPIME 2000 MG: 2 INJECTION, POWDER, FOR SOLUTION INTRAVENOUS at 07:45

## 2024-01-22 RX ADMIN — OXYCODONE HYDROCHLORIDE 5 MG: 5 TABLET ORAL at 02:38

## 2024-01-22 RX ADMIN — SODIUM CHLORIDE, PRESERVATIVE FREE 10 ML: 5 INJECTION INTRAVENOUS at 07:48

## 2024-01-22 RX ADMIN — GABAPENTIN 100 MG: 100 CAPSULE ORAL at 06:34

## 2024-01-22 RX ADMIN — CEFEPIME 2000 MG: 2 INJECTION, POWDER, FOR SOLUTION INTRAVENOUS at 15:31

## 2024-01-22 RX ADMIN — SERTRALINE HYDROCHLORIDE 50 MG: 50 TABLET ORAL at 07:47

## 2024-01-22 ASSESSMENT — PAIN SCALES - GENERAL
PAINLEVEL_OUTOF10: 3
PAINLEVEL_OUTOF10: 7
PAINLEVEL_OUTOF10: 0

## 2024-01-22 ASSESSMENT — PAIN DESCRIPTION - LOCATION: LOCATION: NECK;SHOULDER

## 2024-01-22 ASSESSMENT — PAIN SCALES - WONG BAKER
WONGBAKER_NUMERICALRESPONSE: 0
WONGBAKER_NUMERICALRESPONSE: 0

## 2024-01-22 ASSESSMENT — PAIN DESCRIPTION - ORIENTATION: ORIENTATION: RIGHT

## 2024-01-22 ASSESSMENT — PAIN - FUNCTIONAL ASSESSMENT: PAIN_FUNCTIONAL_ASSESSMENT: ACTIVITIES ARE NOT PREVENTED

## 2024-01-22 ASSESSMENT — ENCOUNTER SYMPTOMS
RESPIRATORY NEGATIVE: 1
GASTROINTESTINAL NEGATIVE: 1
BACK PAIN: 1
EYES NEGATIVE: 1

## 2024-01-22 ASSESSMENT — PAIN DESCRIPTION - DESCRIPTORS: DESCRIPTORS: ACHING

## 2024-01-22 NOTE — PLAN OF CARE
Problem: Safety - Adult  Goal: Free from fall injury  1/21/2024 2118 by Charanjit Steinberg RN  Outcome: Progressing  1/21/2024 1728 by Susan Burgos RN  Outcome: Progressing     Problem: Discharge Planning  Goal: Discharge to home or other facility with appropriate resources  1/21/2024 2118 by Charanjit Steinberg RN  Outcome: Progressing  1/21/2024 1728 by Susan Burgos RN  Outcome: Progressing     Problem: Pain  Goal: Verbalizes/displays adequate comfort level or baseline comfort level  1/21/2024 2118 by Charanjit Steinberg RN  Outcome: Progressing  1/21/2024 1728 by Susan Burgos RN  Outcome: Progressing     Problem: Infection - Adult  Goal: Absence of infection at discharge  1/21/2024 2118 by Charanjit Steinberg RN  Outcome: Progressing  1/21/2024 1728 by Susan Burgos RN  Outcome: Progressing  Goal: Absence of infection during hospitalization  1/21/2024 2118 by Charanjit Steinberg RN  Outcome: Progressing  1/21/2024 1728 by Susan Burgos RN  Outcome: Progressing  Goal: Absence of fever/infection during anticipated neutropenic period  1/21/2024 2118 by Charanjit Steinberg RN  Outcome: Progressing  1/21/2024 1728 by Susan Burgos RN  Outcome: Progressing     Problem: Hematologic - Adult  Goal: Maintains hematologic stability  1/21/2024 2118 by Charanjit Steinberg RN  Outcome: Progressing  1/21/2024 1728 by Susan Burgos RN  Outcome: Progressing     Problem: Skin/Tissue Integrity  Goal: Absence of new skin breakdown  Description: 1.  Monitor for areas of redness and/or skin breakdown  2.  Assess vascular access sites hourly  3.  Every 4-6 hours minimum:  Change oxygen saturation probe site  4.  Every 4-6 hours:  If on nasal continuous positive airway pressure, respiratory therapy assess nares and determine need for appliance change or resting period.  1/21/2024 2118 by Charanjit Steinberg RN  Outcome: Progressing  1/21/2024 1728 by Susan Burgos RN  Outcome: Progressing     Problem: ABCDS Injury

## 2024-01-22 NOTE — CARE COORDINATION
Pt is a readmission. Was last here 1/6-1/19/2024. Chart was screened for discharge planning. Pt is from [facility] and will return when medically stable. CM called Landy with [facility] to see if pt needs a pre-cert to return. Left secure VM asking for a return call.

## 2024-01-22 NOTE — CONSULTS
[facility] Nurse  Consult Note       Sebastian Perkins  AGE: 63 y.o.   GENDER: male  : 1960  TODAY'S DATE:  2024    Subjective:     Reason for CWOCN Evaluation and Assessment: wound assessment      Sebastian Perkins is a 63 y.o. male referred by:   [x] Physician  [] Nursing  [] Other:     Wound Identification:  Wound Type: pressure and non-healing surgical  Contributing Factors: chronic pressure, decreased mobility, malnutrition, and incontinence of stool        PAST MEDICAL HISTORY        Diagnosis Date    Cerebral artery occlusion with cerebral infarction (HCC)     Metastatic malignant neoplasm (HCC)     Septicemia (HCC)        PAST SURGICAL HISTORY    Past Surgical History:   Procedure Laterality Date    LUMBAR SPINE SURGERY N/A 2024    LEFT T6-8 LAMINECTOMY FOR RESECTION OF EXTRADURAL TUMOR, T5-9 PERCUTANEOUS INSTRUMENTATION performed by Nallely Ragland MD at [facility] OR    UPPER GASTROINTESTINAL ENDOSCOPY N/A 2024    EGD BIOPSY performed by Hazel Zhou MD at [facility] ENDOSCOPY       FAMILY HISTORY    No family history on file.    SOCIAL HISTORY    Social History     Tobacco Use    Smoking status: Every Day     Current packs/day: 0.25     Types: Cigarettes    Smokeless tobacco: Never   Substance Use Topics    Alcohol use: Not Currently    Drug use: Never       ALLERGIES    No Known Allergies    MEDICATIONS    No current facility-administered medications on file prior to encounter.     Current Outpatient Medications on File Prior to Encounter   Medication Sig Dispense Refill    gabapentin (NEURONTIN) 100 MG capsule Take 1 capsule by mouth every 8 hours for 30 days. 90 capsule 0    ondansetron (ZOFRAN-ODT) 4 MG disintegrating tablet Take 1 tablet by mouth every 8 hours as needed for Nausea or Vomiting 20 tablet 0    metoprolol tartrate (LOPRESSOR) 50 MG tablet Take 1 tablet by mouth 2 times daily 60 tablet 3    sennosides-docusate sodium (SENOKOT-S) 8.6-50 MG tablet Take 1 tablet by  mouth 2 times daily 30 tablet 0    tamsulosin (FLOMAX) 0.4 MG capsule Take 1 capsule by mouth daily 30 capsule 3    oxyCODONE (ROXICODONE) 5 MG immediate release tablet Take 1 tablet by mouth every 6 hours as needed for Pain for up to 3 days. Max Daily Amount: 20 mg 12 tablet 0         Objective:      BP (!) 94/55   Pulse (!) 116   Temp 97.6 °F (36.4 °C) (Oral)   Resp 19   Ht 1.626 m (5' 4\")   Wt 65.2 kg (143 lb 11.8 oz)   SpO2 98%   BMI 24.67 kg/m²   Domenico Risk Score: Domenico Scale Score: 13    LABS    CBC:   Lab Results   Component Value Date/Time    WBC 13.4 01/22/2024 02:57 AM    RBC 2.89 01/22/2024 02:57 AM    HGB 8.0 01/22/2024 02:57 AM    HCT 30.4 01/22/2024 02:57 AM    .2 01/22/2024 02:57 AM    MCH 27.7 01/22/2024 02:57 AM    MCHC 26.3 01/22/2024 02:57 AM    RDW 16.2 01/22/2024 02:57 AM     01/22/2024 02:57 AM    MPV 9.7 01/22/2024 02:57 AM     CMP:    Lab Results   Component Value Date/Time     01/22/2024 02:57 AM    K 3.9 01/22/2024 02:57 AM     01/22/2024 02:57 AM    CO2 22 01/22/2024 02:57 AM    BUN 15 01/22/2024 02:57 AM    CREATININE 0.4 01/22/2024 02:57 AM    GFRAA >60 07/24/2019 10:32 AM    LABGLOM >60 01/22/2024 02:57 AM    GLUCOSE 111 01/22/2024 02:57 AM    PROT 6.5 01/21/2024 11:41 AM    LABALBU 2.6 01/21/2024 11:41 AM    CALCIUM 7.9 01/22/2024 02:57 AM    BILITOT 0.4 01/21/2024 11:41 AM    ALKPHOS 282 01/21/2024 11:41 AM    AST 28 01/21/2024 11:41 AM    ALT 27 01/21/2024 11:41 AM     Albumin:    Lab Results   Component Value Date/Time    LABALBU 2.6 01/21/2024 11:41 AM     PT/INR:    Lab Results   Component Value Date/Time    PROTIME 15.3 01/08/2024 09:32 AM    INR 1.2 01/08/2024 09:32 AM     HgBA1c:    Lab Results   Component Value Date/Time    LABA1C 5.1 01/08/2024 02:30 PM         Assessment:     Patient Active Problem List   Diagnosis    Weakness    Metastatic malignant neoplasm (HCC)    T7 vertebral fracture (HCC)    Spinal cord compression due to malignant

## 2024-01-22 NOTE — PROGRESS NOTES
V2.0  [facility] [place] Hospitalist Progress Note      Name:  Sebastian Perkins /Age/Sex: 1960  (63 y.o. male)   MRN & CSN:  [id number] & [id number] Encounter Date/Time: 2024 11:04 AM EST    Location:  -A PCP: Vidal Lynch MD       Hospital Day: 2    Assessment and Plan:   Sebastian Perkins is a 63 y.o. male who presents with Sepsis (HCC)     Hospital Problems               Last Modified POA     * (Principal) Sepsis (HCC) 2024 Yes      # Sepsis 2/2 possible UTI  # Diarrhea  -Presented with fever of 101 at the nursing home, has been afebrile since being here  -SIRS criteria: Tachycardia, hypotension, leukocytosis  -Patient with recent laminectomy  Rapid flu and COVID test were negative  -Negative respiratory PCR panel  -UA positive for nitrites and moderate blood  -Procalcitonin mildly elevated at 0.493  -CT T-spine showing posterior thoracic fusion from T5-T9 for stabilization of pathologic fracture at T7  -Recently completed course of ceftriaxone for UTI on previous admission  Follow-up on blood cultures and urine culture  Continue on cefepime and vancomycin  GI PCR panel and C. difficile test ordered pending results  MRSA screen     # Acute on chronic anemia  -Hemoglobin 7.8, previously 8.8 3 days ago  Monitor CBC daily, transfuse if hemoglobin less than     # Elevated troponin likely in the setting  -Troponin: 43-->62  - Continue trending troponin     # Cancer of unknown primary  -Recently underwent laminectomy due to spinal metastases and pathologic fracture  -CT scan showing atelectasis within the left T7 pedicle extends into adjacent rib as well as loss of central epidural space 2.2 cm right adrenal mass, most likely benign, very large left posterior back enhancing mass measuring 9.1 x 3.2 x 6.6 cm, encases body of the scapula and compatible with large met  Neurosurgery on consult, recommended holding off on MRI for now per ED provider  Adrenal washout CT exam or MRI exam to exclude adrenal    Troponin    Collection Time: 01/21/24 11:41 AM   Result Value Ref Range    Troponin, High Sensitivity 43 (H) 0 - 22 ng/L   Brain Natriuretic Peptide    Collection Time: 01/21/24 11:41 AM   Result Value Ref Range    Pro-.4 <300 PG/ML   CK    Collection Time: 01/21/24 11:41 AM   Result Value Ref Range    Total CK 41 38 - 174 IU/L   TSH    Collection Time: 01/21/24 11:41 AM   Result Value Ref Range    TSH, High Sensitivity 0.403 0.270 - 4.20 uIu/ml   Sedimentation Rate    Collection Time: 01/21/24 11:41 AM   Result Value Ref Range    Sed Rate 90 (H) 0 - 20 MM/HR   C-Reactive Protein    Collection Time: 01/21/24 11:41 AM   Result Value Ref Range    CRP High Sensitivity 191.5 (H) <5.0 mg/L   EKG 12 Lead    Collection Time: 01/21/24 12:47 PM   Result Value Ref Range    Ventricular Rate 107 BPM    Atrial Rate 107 BPM    P-R Interval 132 ms    QRS Duration 82 ms    Q-T Interval 370 ms    QTc Calculation (Bazett) 493 ms    P Axis 47 degrees    R Axis 8 degrees    T Axis 49 degrees    Diagnosis       Sinus tachycardia  Otherwise normal ECG  When compared with ECG of 15-STEPHEN-2024 18:28,  Criteria for Inferior infarct are no longer present  Confirmed by Lee Palm ([id number]) on 1/21/2024 9:04:30 PM     COVID-19, Rapid    Collection Time: 01/21/24 12:55 PM    Specimen: Nasopharyngeal   Result Value Ref Range    Source UNKNOWN     SARS-CoV-2, NAAT NOT DETECTED NOT DETECTED   Influenza A/B, Molecular    Collection Time: 01/21/24 12:55 PM    Specimen: Nasal   Result Value Ref Range    Influenza A Antigen NOT DETECTED NOT DETECTED    Influenza B Antigen NOT DETECTED NOT DETECTED   Urinalysis    Collection Time: 01/21/24  1:27 PM   Result Value Ref Range    Color, UA YELLOW YELLOW    Clarity, UA CLEAR CLEAR    Glucose, Urine NEGATIVE NEGATIVE MG/DL    Bilirubin Urine NEGATIVE NEGATIVE MG/DL    Ketones, Urine NEGATIVE NEGATIVE MG/DL    Specific Gravity, UA <1.005 1.001 - 1.035    Blood, Urine MODERATE NUMBER OR AMOUNT OBSERVED

## 2024-01-22 NOTE — CONSULTS
Neurosurgery   Consult Note      Reason for Consult: Recent thoracic fusion  Consulting Physician: Uvaldo Gautam DO   Attending Physician:  Shantel Bhagat MD    Date of Admission: 1/21/2024  Subjective:   CHIEF COMPLAINT: Febrile    HPI:  63 y.o. 1960  Who presented to the ED 1/21/24 from his nursing facility with complaints of fever of 101 degrees.  Patient is known to our service, underwent a T6-8 laminectomy for resection of tumor at T7, posterior T5-9 instrumentation and fusion on 1/8/2024.  Pathology sent intraoperatively to show concerns for GI/pancreaticobiliary cancer. He has been followed by Dr. Rollins, has agreed to radiation of the T-spine per last admission notes. He is 19 days postop today.  Per chart review patient has been afebrile since being admitted, did have leukocytosis at 16, has improved to 13 today.  CT of the thoracic spine showed concerns for soft tissue lesion at T7, hardware in good alignment.  The patient also reports a new cough that developed over the last few days.  Lactic was within normal range, no positives on respiratory panel, no abnormalities on chest x-ray.  Blood cultures and urine cultures are pending. Sed rate 90, crp 191. Patient is currently on vancomycin and cefepime and azithromycin. Patient is not on any antiplatelets or anticoagulants. PMHx positive for right stroke, sepsis, metastatic disease.  Past surgical history positive for, thoracic fusion 1/8/24, EGD.                   Past Medical and Surgical History:       Diagnosis Date    Cerebral artery occlusion with cerebral infarction (HCC)     Metastatic malignant neoplasm (HCC)     Septicemia (HCC)          Procedure Laterality Date    LUMBAR SPINE SURGERY N/A 1/8/2024    LEFT T6-8 LAMINECTOMY FOR RESECTION OF EXTRADURAL TUMOR, T5-9 PERCUTANEOUS INSTRUMENTATION performed by Nallely Ragland MD at [facility] OR    UPPER GASTROINTESTINAL ENDOSCOPY N/A 1/16/2024    EGD BIOPSY performed by Hazel Zhou MD at [facility]  ENDOSCOPY       Social History:    TOBACCO:   reports that he has been smoking cigarettes. He has never used smokeless tobacco.  ETOH:   reports that he does not currently use alcohol.    Family History:   No family history on file.    Current Medications:    Current Facility-Administered Medications: sodium chloride 0.9 % bolus 1,000 mL, 1,000 mL, IntraVENous, Once  fentaNYL (SUBLIMAZE) injection 25 mcg, 25 mcg, IntraVENous, Q1H PRN  ondansetron (ZOFRAN) injection 4 mg, 4 mg, IntraVENous, Q30 Min PRN  sodium chloride flush 0.9 % injection 5-40 mL, 5-40 mL, IntraVENous, 2 times per day  sodium chloride flush 0.9 % injection 5-40 mL, 5-40 mL, IntraVENous, PRN  0.9 % sodium chloride infusion, , IntraVENous, PRN  enoxaparin (LOVENOX) injection 40 mg, 40 mg, SubCUTAneous, Daily  acetaminophen (TYLENOL) tablet 650 mg, 650 mg, Oral, Q6H PRN **OR** acetaminophen (TYLENOL) suppository 650 mg, 650 mg, Rectal, Q6H PRN  [DISCONTINUED] cefTRIAXone (ROCEPHIN) 1,000 mg in sodium chloride 0.9 % 50 mL IVPB (mini-bag), 1,000 mg, IntraVENous, Q24H **AND** azithromycin (ZITHROMAX) 500 mg in sodium chloride 0.9 % 250 mL IVPB (Rhqo5Ltx), 500 mg, IntraVENous, Q24H  ceFEPIme (MAXIPIME) 2,000 mg in sodium chloride 0.9 % 100 mL IVPB (mini-bag), 2,000 mg, IntraVENous, Q8H  vancomycin (VANCOCIN) 1,250 mg in sodium chloride 0.9 % 250 mL IVPB (Oqaf1Otp), 1,250 mg, IntraVENous, Q12H  gabapentin (NEURONTIN) capsule 100 mg, 100 mg, Oral, 3 times per day  metoprolol tartrate (LOPRESSOR) tablet 50 mg, 50 mg, Oral, BID  tamsulosin (FLOMAX) capsule 0.4 mg, 0.4 mg, Oral, Daily  sertraline (ZOLOFT) tablet 50 mg, 50 mg, Oral, Daily  pravastatin (PRAVACHOL) tablet 20 mg, 20 mg, Oral, Nightly  traZODone (DESYREL) tablet 50 mg, 50 mg, Oral, Nightly    No Known Allergies     REVIEW OF SYSTEMS:    CONSTITUTIONAL:  negative for fevers, chills, diaphoresis, activity change, appetite change, fatigue  EYES:  negative for blurred vision, eye discharge, visual

## 2024-01-22 NOTE — PROGRESS NOTES
PHARMACY VANCOMYCIN MONITORING SERVICE  Pharmacy consulted by Dr. Thayer for monitoring and adjustment.    Indication for treatment: Sepsis of Unknown Etiology  Goal trough: Trough Goal: 15-20 mcg/mL  AUC/STEPH: 400-600    Risk Factors for MRSA Identified:   Hospitalization within the past 90 days, Received IV antibiotics within the past 90 days    Pertinent Laboratory Values:   Temp Readings from Last 3 Encounters:   01/22/24 97.6 °F (36.4 °C) (Oral)   01/19/24 97.8 °F (36.6 °C) (Axillary)   07/24/19 98.5 °F (36.9 °C)     Recent Labs     01/21/24  1141 01/22/24  0257   WBC 16.3* 13.4*   LACTATE 1.5  --      Recent Labs     01/21/24  1141 01/22/24  0257   BUN 16 15   CREATININE 0.5* 0.4*     Estimated Creatinine Clearance: 156 mL/min (A) (based on SCr of 0.4 mg/dL (L)).    Intake/Output Summary (Last 24 hours) at 1/22/2024 1026  Last data filed at 1/22/2024 0638  Gross per 24 hour   Intake 1080 ml   Output 1100 ml   Net -20 ml       Pertinent Cultures:   Date    Source    Results  01/21/24  Blood    In process  01/21/24  COVID-19/Influenza  Negative  01/21/24  MRSA Nasal   In process  01/21/24  Respiratory (Sputum)  negative  01/21/24  Urine    In process    Vancomycin level:   TROUGH:  No results for input(s): \"VANCOTROUGH\" in the last 72 hours.  RANDOM:  No results for input(s): \"VANCORANDOM\" in the last 72 hours.    Assessment:  HPI: 63 year old male admitted 01/21/24 for sepsis. Sent from [facility] via EMS for \"low vital signs and a fever\". Recently admitted 01/06/24-01/19/24 during which time he was treated with ceftriaxone for SIRS and UTI. Also underwent spinal surgery 1/8/24 for thoracic fusion and epidural tumor decompression. On presentation patient was afebrile with hypotension and elevated WBC, CRP, and ESR. CXR without evidence of acute pneumonia.  SCr, BUN, and urine output:   SCr 0.4, at baseline; down from 0.5 yesterday   BUN within normal limits  Limited urine output data available  Day(s) of therapy:  2 of 7  Vancomycin concentration:  1/23/24 - to be collected    Plan:  Received vancomycin 2000 mg IV once in emergency department  Current dose of vancomycin 1250 mg IV every 12 hours predicts suboptimal AUC  Increase to 1500mg vancomycin IVPB every 12 hours   Predicted  at steady state  Pharmacy will continue to monitor patient and adjust therapy as indicated    VANCOMYCIN CONCENTRATION SCHEDULED FOR 01/23/24 @0200    Thank you for the consult.  Ginny Mejia RPH, PharmD, BCPS  1/22/2024 10:26 AM

## 2024-01-23 ENCOUNTER — TELEPHONE (OUTPATIENT)
Dept: ONCOLOGY | Age: 64
End: 2024-01-23

## 2024-01-23 ENCOUNTER — APPOINTMENT (OUTPATIENT)
Dept: MRI IMAGING | Age: 64
End: 2024-01-23
Payer: COMMERCIAL

## 2024-01-23 LAB
ANION GAP SERPL CALCULATED.3IONS-SCNC: 10 MMOL/L (ref 7–16)
BASOPHILS ABSOLUTE: 0.1 K/CU MM
BASOPHILS RELATIVE PERCENT: 0.5 % (ref 0–1)
BUN SERPL-MCNC: 12 MG/DL (ref 6–23)
CALCIUM SERPL-MCNC: 8 MG/DL (ref 8.3–10.6)
CHLORIDE BLD-SCNC: 103 MMOL/L (ref 99–110)
CO2: 24 MMOL/L (ref 21–32)
CREAT SERPL-MCNC: 0.4 MG/DL (ref 0.9–1.3)
CRP SERPL HS-MCNC: 117.7 MG/L
CULTURE: ABNORMAL
CULTURE: ABNORMAL
DIFFERENTIAL TYPE: ABNORMAL
DOSE AMOUNT: NORMAL
DOSE TIME: NORMAL
EOSINOPHILS ABSOLUTE: 0 K/CU MM
EOSINOPHILS RELATIVE PERCENT: 0.1 % (ref 0–3)
ERYTHROCYTE SEDIMENTATION RATE: 122 MM/HR (ref 0–20)
GFR SERPL CREATININE-BSD FRML MDRD: >60 ML/MIN/1.73M2
GLUCOSE SERPL-MCNC: 120 MG/DL (ref 70–99)
HCT VFR BLD CALC: 24.9 % (ref 42–52)
HEMOGLOBIN: 7.5 GM/DL (ref 13.5–18)
IMMATURE NEUTROPHIL %: 0.5 % (ref 0–0.43)
LYMPHOCYTES ABSOLUTE: 1.6 K/CU MM
LYMPHOCYTES RELATIVE PERCENT: 9.3 % (ref 24–44)
Lab: ABNORMAL
MAGNESIUM: 2 MG/DL (ref 1.8–2.4)
MCH RBC QN AUTO: 28 PG (ref 27–31)
MCHC RBC AUTO-ENTMCNC: 30.1 % (ref 32–36)
MCV RBC AUTO: 92.9 FL (ref 78–100)
MONOCYTES ABSOLUTE: 1.1 K/CU MM
MONOCYTES RELATIVE PERCENT: 6.6 % (ref 0–4)
NUCLEATED RBC %: 0 %
PDW BLD-RTO: 15.9 % (ref 11.7–14.9)
PLATELET # BLD: 474 K/CU MM (ref 140–440)
PMV BLD AUTO: 9.4 FL (ref 7.5–11.1)
POTASSIUM SERPL-SCNC: 3.5 MMOL/L (ref 3.5–5.1)
RBC # BLD: 2.68 M/CU MM (ref 4.6–6.2)
SEGMENTED NEUTROPHILS ABSOLUTE COUNT: 14 K/CU MM
SEGMENTED NEUTROPHILS RELATIVE PERCENT: 83 % (ref 36–66)
SODIUM BLD-SCNC: 137 MMOL/L (ref 135–145)
SPECIMEN: ABNORMAL
TOTAL IMMATURE NEUTOROPHIL: 0.09 K/CU MM
TOTAL NUCLEATED RBC: 0 K/CU MM
VANCOMYCIN TROUGH: 17.4 UG/ML (ref 10–20)
WBC # BLD: 16.9 K/CU MM (ref 4–10.5)

## 2024-01-23 PROCEDURE — 80048 BASIC METABOLIC PNL TOTAL CA: CPT

## 2024-01-23 PROCEDURE — 6370000000 HC RX 637 (ALT 250 FOR IP): Performed by: INTERNAL MEDICINE

## 2024-01-23 PROCEDURE — 36415 COLL VENOUS BLD VENIPUNCTURE: CPT

## 2024-01-23 PROCEDURE — 2580000003 HC RX 258: Performed by: INTERNAL MEDICINE

## 2024-01-23 PROCEDURE — 83735 ASSAY OF MAGNESIUM: CPT

## 2024-01-23 PROCEDURE — 99024 POSTOP FOLLOW-UP VISIT: CPT | Performed by: PHYSICIAN ASSISTANT

## 2024-01-23 PROCEDURE — 94761 N-INVAS EAR/PLS OXIMETRY MLT: CPT

## 2024-01-23 PROCEDURE — 85652 RBC SED RATE AUTOMATED: CPT

## 2024-01-23 PROCEDURE — 80202 ASSAY OF VANCOMYCIN: CPT

## 2024-01-23 PROCEDURE — 6360000002 HC RX W HCPCS: Performed by: INTERNAL MEDICINE

## 2024-01-23 PROCEDURE — 86140 C-REACTIVE PROTEIN: CPT

## 2024-01-23 PROCEDURE — 2060000000 HC ICU INTERMEDIATE R&B

## 2024-01-23 PROCEDURE — 85025 COMPLETE CBC W/AUTO DIFF WBC: CPT

## 2024-01-23 PROCEDURE — A9579 GAD-BASE MR CONTRAST NOS,1ML: HCPCS | Performed by: PHYSICIAN ASSISTANT

## 2024-01-23 PROCEDURE — 72157 MRI CHEST SPINE W/O & W/DYE: CPT

## 2024-01-23 PROCEDURE — 6360000004 HC RX CONTRAST MEDICATION: Performed by: PHYSICIAN ASSISTANT

## 2024-01-23 RX ADMIN — GABAPENTIN 100 MG: 100 CAPSULE ORAL at 16:14

## 2024-01-23 RX ADMIN — AZITHROMYCIN DIHYDRATE 500 MG: 500 INJECTION, POWDER, LYOPHILIZED, FOR SOLUTION INTRAVENOUS at 16:23

## 2024-01-23 RX ADMIN — VANCOMYCIN HYDROCHLORIDE 1250 MG: 1.25 INJECTION, POWDER, LYOPHILIZED, FOR SOLUTION INTRAVENOUS at 16:27

## 2024-01-23 RX ADMIN — GADOTERIDOL 13 ML: 279.3 INJECTION, SOLUTION INTRAVENOUS at 15:04

## 2024-01-23 RX ADMIN — ACETAMINOPHEN 650 MG: 325 TABLET ORAL at 21:29

## 2024-01-23 RX ADMIN — CEFEPIME 2000 MG: 2 INJECTION, POWDER, FOR SOLUTION INTRAVENOUS at 06:48

## 2024-01-23 RX ADMIN — TRAZODONE HYDROCHLORIDE 50 MG: 50 TABLET ORAL at 21:30

## 2024-01-23 RX ADMIN — GABAPENTIN 100 MG: 100 CAPSULE ORAL at 21:30

## 2024-01-23 RX ADMIN — PRAVASTATIN SODIUM 20 MG: 10 TABLET ORAL at 21:30

## 2024-01-23 RX ADMIN — CEFEPIME 2000 MG: 2 INJECTION, POWDER, FOR SOLUTION INTRAVENOUS at 16:20

## 2024-01-23 RX ADMIN — VANCOMYCIN HYDROCHLORIDE 1250 MG: 1.25 INJECTION, POWDER, LYOPHILIZED, FOR SOLUTION INTRAVENOUS at 03:26

## 2024-01-23 RX ADMIN — GABAPENTIN 100 MG: 100 CAPSULE ORAL at 06:47

## 2024-01-23 RX ADMIN — METOPROLOL TARTRATE 50 MG: 50 TABLET, FILM COATED ORAL at 08:12

## 2024-01-23 RX ADMIN — METOPROLOL TARTRATE 50 MG: 50 TABLET, FILM COATED ORAL at 21:29

## 2024-01-23 RX ADMIN — TAMSULOSIN HYDROCHLORIDE 0.4 MG: 0.4 CAPSULE ORAL at 08:12

## 2024-01-23 RX ADMIN — CEFEPIME 2000 MG: 2 INJECTION, POWDER, FOR SOLUTION INTRAVENOUS at 21:30

## 2024-01-23 RX ADMIN — ENOXAPARIN SODIUM 40 MG: 100 INJECTION SUBCUTANEOUS at 08:12

## 2024-01-23 RX ADMIN — SERTRALINE HYDROCHLORIDE 50 MG: 50 TABLET ORAL at 08:12

## 2024-01-23 ASSESSMENT — PAIN SCALES - GENERAL
PAINLEVEL_OUTOF10: 2
PAINLEVEL_OUTOF10: 0

## 2024-01-23 ASSESSMENT — PAIN DESCRIPTION - LOCATION: LOCATION: HEAD

## 2024-01-23 NOTE — PROGRESS NOTES
Neurosurgery Progress Note  1/23/2024 10:55 AM      Left T6-8 laminectomy, t5-9 percutaneous instrumentation and fusion   POD: 14    Assessment and Plan:   Sepsis- had recent intervention for tumor debulking and thoracic fusion. patient tachycardic, was tachycardic prior to surgery during last admission. Leukocytosis at 16.9 today, has been afebrile this admission. On azythromycin, cefepime, and vancomycin. Blood cultures negative so far. Urine culture positive for staph epidermis, sensitivities pending. MRI pending of thoracic spine. Spoke with MRI team, it should be performed today.   Metastatic GI/pancreatobilliary carcinoma- patient had concerns of recurrent stenosis during last admission. Spoke with family about recommendation for palliative care and hospice. Patient wanted to obtain a definitive diagnosis. Had a colonoscopy last admission with biopsy. Recommend Oncology consult prior to patient leaving to re-discuss next steps.    Hx of stroke- [place] in another state and has had left upper and lower extremity deficits as a result. Left arm and leg flaccid at baseline. Would get around using a wheelchair and his right arm and leg.   S/P thoracic fusion- recommend incentive spirometer, encourage frequent turns, avoid laying patient on his back directly.     Supervising physician: Nallely Ragland MD.  Dr. Ragland was readily and continuously available by phone for direct consultation regarding the care of this patient.    Dragon dictation may have been used in the writing of this note. Spelling or word errors may have occurred. Please call for clarification if there are concerns.      Subjective:   Admit Date: 1/21/2024  PCP: Vidal Lynch MD    Patient sitting up in bed, understands that he will have an MRI today to further evaluate his operative area in his t-spine. Again recommended that repeat surgery to the area if tumor has regrown would not be advised. He states he understands.  Also explained his

## 2024-01-23 NOTE — CONSULTS
Weight (IBW): 130 lbs (59 kg)    Admission Body Weight: 65.2 kg (143 lb 11.8 oz) (1/22 first measured wt)  Current Body Weight: 65.2 kg (143 lb 11.8 oz), 110.6 % IBW. Weight Source: Bed Scale  Current BMI (kg/m2): 24.7  Usual Body Weight: 70 kg (154 lb 5.2 oz) (1/15/24 per RD notes)  % Weight Change (Calculated): -6.9  Weight Adjustment For: No Adjustment                 BMI Categories: Normal Weight (BMI 22.0 to 24.9) age over 65    Estimated Daily Nutrient Needs:  Energy Requirements Based On: Kcal/kg  Weight Used for Energy Requirements: Current  Energy (kcal/day): 7264-2794 (25-30 kcals/kg)  Weight Used for Protein Requirements: Ideal  Protein (g/day): 71-89 (1.2-1.5 g/kg)  Method Used for Fluid Requirements: 1 ml/kcal  Fluid (ml/day): 4792-8948    Nutrition Diagnosis:   Inadequate protein-energy intake related to increase demand for energy/nutrients as evidenced by wounds, weight loss, weight loss greater than or equal to 2% in 1 week    Nutrition Interventions:   Food and/or Nutrient Delivery: Continue Current Diet, Continue Oral Nutrition Supplement, Start Oral Nutrition Supplement  Nutrition Education/Counseling: No recommendation at this time  Coordination of Nutrition Care: Continue to monitor while inpatient, Coordination of Care  Plan of Care discussed with: pt    Goals:     Goals: Meet at least 75% of estimated needs       Nutrition Monitoring and Evaluation:   Behavioral-Environmental Outcomes: None Identified  Food/Nutrient Intake Outcomes: Food and Nutrient Intake, Supplement Intake  Physical Signs/Symptoms Outcomes: Biochemical Data, Meal Time Behavior, Nutrition Focused Physical Findings, Skin, Weight    Discharge Planning:    Too soon to determine     Summer Grant RD, LD  Contact: [phone]

## 2024-01-23 NOTE — PROGRESS NOTES
available  Day(s) of therapy: 3 of 7  Vancomycin concentration:  1/23/24 - 17.4 ~9h post dose  Plan:  Received vancomycin 2000 mg IV once in emergency department  Continue 1250mg IVPB vancomycin Q12H   Predicted  at steady state  Pharmacy will continue to monitor patient and adjust therapy as indicated    VANCOMYCIN CONCENTRATION SCHEDULED FOR 01/23/24 @0200    Thank you for the consult.  Ginny Mejia RPH, PharmD, BCPS  1/23/2024 10:09 AM

## 2024-01-23 NOTE — TELEPHONE ENCOUNTER
Patient is in patient at [facility] she and her sister would like a family consult with Dr. Rollins.  [place] states she needs it in person as she has a hard time hearing on the phone.   Please call [phone]

## 2024-01-23 NOTE — PROGRESS NOTES
V2.0  [facility] [place] Hospitalist Progress Note      Name:  Sebastian Perkins /Age/Sex: 1960  (63 y.o. male)   MRN & CSN:  [id number] & [id number] Encounter Date/Time: 2024 11:04 AM EST    Location:  -A PCP: Vidal Lynch MD       Hospital Day: 3    Assessment and Plan:   Sebastian Perkins is a 63 y.o. male who presents with Sepsis (HCC)     Hospital Problems               Last Modified POA     * (Principal) Sepsis (HCC) 2024 Yes      # Sepsis-unknown etiology  # Diarrhea  -Presented with fever of 101 at the nursing home, has been afebrile since being here  -SIRS criteria: Tachycardia, hypotension, leukocytosis  -Patient with recent intervention for tumor debulking and thoracic fusion.  -Rapid flu and COVID test were negative  -Negative respiratory PCR panel  -UA positive for nitrites and moderate blood.  Urine culture growing Staphylococcus epidermidis.  Unlikely for patient's sepsis.  -Procalcitonin mildly elevated at 0.493  -CT T-spine showing posterior thoracic fusion from T5-T9 for stabilization of pathologic fracture at T7  -Recently completed course of ceftriaxone for UTI on previous admission  -Blood culture negative so far  -Continue on cefepime and vancomycin    # Acute on chronic anemia  -Hemoglobin 7.8, previously 8.8 3 days ago  Monitor CBC daily, transfuse if hemoglobin less than     # Elevated troponin likely in the setting  -Troponin: 43-->62  - Continue trending troponin     # Cancer of unknown primary  -Recently underwent laminectomy due to spinal metastases and pathologic fracture  -CT scan  showing atelectasis within the left T7 pedicle extends into adjacent rib as well as loss of central epidural space 2.2 cm right adrenal mass, most likely benign, very large left posterior back enhancing mass measuring 9.1 x 3.2 x 6.6 cm, encases body of the scapula and compatible with large met  Neurosurgery on consult, recommended holding off on MRI for now per ED provider       # History of  28.0 27 - 31 PG    MCHC 30.1 (L) 32.0 - 36.0 %    RDW 15.9 (H) 11.7 - 14.9 %    Platelets 474 (H) 140 - 440 K/CU MM    MPV 9.4 7.5 - 11.1 FL    Differential Type AUTOMATED DIFFERENTIAL     Segs Relative 83.0 (H) 36 - 66 %    Lymphocytes % 9.3 (L) 24 - 44 %    Monocytes % 6.6 (H) 0 - 4 %    Eosinophils % 0.1 0 - 3 %    Basophils % 0.5 0 - 1 %    Segs Absolute 14.0 K/CU MM    Lymphocytes Absolute 1.6 K/CU MM    Monocytes Absolute 1.1 K/CU MM    Eosinophils Absolute 0.0 K/CU MM    Basophils Absolute 0.1 K/CU MM    Nucleated RBC % 0.0 %    Total Nucleated RBC 0.0 K/CU MM    Total Immature Neutrophil 0.09 K/CU MM    Immature Neutrophil % 0.5 (H) 0 - 0.43 %   Magnesium    Collection Time: 01/23/24  2:29 AM   Result Value Ref Range    Magnesium 2.0 1.8 - 2.4 mg/dl        Imaging/Diagnostics Last 24 Hours   CT THORACIC SPINE W CONTRAST    Result Date: 1/21/2024  EXAMINATION: CT OF THE THORACIC SPINE WITH CONTRAST  1/21/2024 11:53 am: TECHNIQUE: CT of the thoracic spine was performed with the administration of intravenous contrast.  Multiplanar reformatted images are provided for review. Automated exposure control, iterative reconstruction, and/or weight based adjustment of the mA/kV was utilized to reduce the radiation dose to as low as reasonably achievable. COMPARISON: MRI and CT of 01/08/2024. HISTORY: ORDERING SYSTEM PROVIDED HISTORY: back pain/post op fever TECHNOLOGIST PROVIDED HISTORY: Reason for exam:->back pain/post op fever Additional Contrast?->None Reason for Exam: back pain/post op fever FINDINGS: BONES/ALIGNMENT: There is normal alignment of the spine.  There is a posterior fusion from T5 through T9.  There is a pathologic compression fracture at T7 with loss of 70% of vertebral body height.  Soft tissue mass is identified centered on the left T7 pedicle measuring 2.6 cm.  The spinal canal component cannot be adequately assessed partially due to surrounding streak artifact.  Therefore the degree of cord

## 2024-01-23 NOTE — PROGRESS NOTES
Spoke with RN @ 1004 to have screening form completed. Once screening form is completed we will send for patient to have MRI done. EJWEL

## 2024-01-23 NOTE — PROGRESS NOTES
IV placed x 2 on L arm PT tolerated well. This said nurse assessed PT needs and PT refused lunch only wanted coffee and ice from soda. This said nurse provided ice and prepared coffee for PT. Call light within reach. Will check back in with PT.

## 2024-01-23 NOTE — CARE COORDINATION
CM received a call from Landy with [facility]. Pt is there long term care. Pt's insurance does not pay for a bed hold. Pt will need a pre-cert to return. Allow 2 days for the pre-cert. Landy will do the pre-cert. PS to Dr Sprague informing her.

## 2024-01-24 ENCOUNTER — CLINICAL DOCUMENTATION (OUTPATIENT)
Dept: ONCOLOGY | Age: 64
End: 2024-01-24

## 2024-01-24 LAB
ANION GAP SERPL CALCULATED.3IONS-SCNC: 11 MMOL/L (ref 7–16)
BASOPHILS ABSOLUTE: 0.1 K/CU MM
BASOPHILS RELATIVE PERCENT: 0.4 % (ref 0–1)
BUN SERPL-MCNC: 13 MG/DL (ref 6–23)
CALCIUM SERPL-MCNC: 8.1 MG/DL (ref 8.3–10.6)
CHLORIDE BLD-SCNC: 104 MMOL/L (ref 99–110)
CO2: 24 MMOL/L (ref 21–32)
CREAT SERPL-MCNC: 0.4 MG/DL (ref 0.9–1.3)
CRP SERPL HS-MCNC: 112.3 MG/L
DIFFERENTIAL TYPE: ABNORMAL
EOSINOPHILS ABSOLUTE: 0 K/CU MM
EOSINOPHILS RELATIVE PERCENT: 0.2 % (ref 0–3)
ERYTHROCYTE SEDIMENTATION RATE: 56 MM/HR (ref 0–20)
GFR SERPL CREATININE-BSD FRML MDRD: >60 ML/MIN/1.73M2
GLUCOSE SERPL-MCNC: 109 MG/DL (ref 70–99)
HCT VFR BLD CALC: 25.8 % (ref 42–52)
HEMOGLOBIN: 7.6 GM/DL (ref 13.5–18)
IMMATURE NEUTROPHIL %: 0.7 % (ref 0–0.43)
LYMPHOCYTES ABSOLUTE: 1.5 K/CU MM
LYMPHOCYTES RELATIVE PERCENT: 9.4 % (ref 24–44)
MAGNESIUM: 2 MG/DL (ref 1.8–2.4)
MCH RBC QN AUTO: 27 PG (ref 27–31)
MCHC RBC AUTO-ENTMCNC: 29.5 % (ref 32–36)
MCV RBC AUTO: 91.8 FL (ref 78–100)
MONOCYTES ABSOLUTE: 1.2 K/CU MM
MONOCYTES RELATIVE PERCENT: 7.5 % (ref 0–4)
NUCLEATED RBC %: 0 %
PDW BLD-RTO: 15.9 % (ref 11.7–14.9)
PLATELET # BLD: 492 K/CU MM (ref 140–440)
PMV BLD AUTO: 9.5 FL (ref 7.5–11.1)
POTASSIUM SERPL-SCNC: 3.4 MMOL/L (ref 3.5–5.1)
RBC # BLD: 2.81 M/CU MM (ref 4.6–6.2)
SEGMENTED NEUTROPHILS ABSOLUTE COUNT: 12.8 K/CU MM
SEGMENTED NEUTROPHILS RELATIVE PERCENT: 81.8 % (ref 36–66)
SODIUM BLD-SCNC: 139 MMOL/L (ref 135–145)
TOTAL IMMATURE NEUTOROPHIL: 0.11 K/CU MM
TOTAL NUCLEATED RBC: 0 K/CU MM
WBC # BLD: 15.7 K/CU MM (ref 4–10.5)

## 2024-01-24 PROCEDURE — 6360000002 HC RX W HCPCS: Performed by: INTERNAL MEDICINE

## 2024-01-24 PROCEDURE — 83735 ASSAY OF MAGNESIUM: CPT

## 2024-01-24 PROCEDURE — 2580000003 HC RX 258: Performed by: INTERNAL MEDICINE

## 2024-01-24 PROCEDURE — 6360000002 HC RX W HCPCS: Performed by: EMERGENCY MEDICINE

## 2024-01-24 PROCEDURE — 36415 COLL VENOUS BLD VENIPUNCTURE: CPT

## 2024-01-24 PROCEDURE — 99024 POSTOP FOLLOW-UP VISIT: CPT | Performed by: PHYSICIAN ASSISTANT

## 2024-01-24 PROCEDURE — 85025 COMPLETE CBC W/AUTO DIFF WBC: CPT

## 2024-01-24 PROCEDURE — 6370000000 HC RX 637 (ALT 250 FOR IP): Performed by: INTERNAL MEDICINE

## 2024-01-24 PROCEDURE — 85652 RBC SED RATE AUTOMATED: CPT

## 2024-01-24 PROCEDURE — 86140 C-REACTIVE PROTEIN: CPT

## 2024-01-24 PROCEDURE — 80048 BASIC METABOLIC PNL TOTAL CA: CPT

## 2024-01-24 PROCEDURE — 2060000000 HC ICU INTERMEDIATE R&B

## 2024-01-24 PROCEDURE — 94761 N-INVAS EAR/PLS OXIMETRY MLT: CPT

## 2024-01-24 RX ORDER — NITROFURANTOIN 25; 75 MG/1; MG/1
100 CAPSULE ORAL EVERY 12 HOURS SCHEDULED
Status: DISCONTINUED | OUTPATIENT
Start: 2024-01-24 | End: 2024-01-26 | Stop reason: HOSPADM

## 2024-01-24 RX ORDER — POTASSIUM CHLORIDE 7.45 MG/ML
10 INJECTION INTRAVENOUS
Status: COMPLETED | OUTPATIENT
Start: 2024-01-24 | End: 2024-01-24

## 2024-01-24 RX ADMIN — TAMSULOSIN HYDROCHLORIDE 0.4 MG: 0.4 CAPSULE ORAL at 09:20

## 2024-01-24 RX ADMIN — CEFEPIME 2000 MG: 2 INJECTION, POWDER, FOR SOLUTION INTRAVENOUS at 09:27

## 2024-01-24 RX ADMIN — METOPROLOL TARTRATE 50 MG: 50 TABLET, FILM COATED ORAL at 09:20

## 2024-01-24 RX ADMIN — FENTANYL CITRATE 25 MCG: 50 INJECTION, SOLUTION INTRAMUSCULAR; INTRAVENOUS at 13:00

## 2024-01-24 RX ADMIN — ACETAMINOPHEN 650 MG: 325 TABLET ORAL at 18:06

## 2024-01-24 RX ADMIN — ACETAMINOPHEN 650 MG: 325 TABLET ORAL at 11:00

## 2024-01-24 RX ADMIN — METOPROLOL TARTRATE 50 MG: 50 TABLET, FILM COATED ORAL at 21:20

## 2024-01-24 RX ADMIN — VANCOMYCIN HYDROCHLORIDE 1250 MG: 1.25 INJECTION, POWDER, LYOPHILIZED, FOR SOLUTION INTRAVENOUS at 04:46

## 2024-01-24 RX ADMIN — POTASSIUM CHLORIDE 10 MEQ: 7.46 INJECTION, SOLUTION INTRAVENOUS at 16:52

## 2024-01-24 RX ADMIN — SODIUM CHLORIDE, PRESERVATIVE FREE 10 ML: 5 INJECTION INTRAVENOUS at 09:21

## 2024-01-24 RX ADMIN — SODIUM CHLORIDE, PRESERVATIVE FREE 10 ML: 5 INJECTION INTRAVENOUS at 21:21

## 2024-01-24 RX ADMIN — GABAPENTIN 100 MG: 100 CAPSULE ORAL at 21:20

## 2024-01-24 RX ADMIN — GABAPENTIN 100 MG: 100 CAPSULE ORAL at 04:46

## 2024-01-24 RX ADMIN — TRAZODONE HYDROCHLORIDE 50 MG: 50 TABLET ORAL at 21:21

## 2024-01-24 RX ADMIN — NITROFURANTOIN MONOHYDRATE/MACROCRYSTALS 100 MG: 25; 75 CAPSULE ORAL at 13:01

## 2024-01-24 RX ADMIN — PRAVASTATIN SODIUM 20 MG: 10 TABLET ORAL at 21:20

## 2024-01-24 RX ADMIN — POTASSIUM CHLORIDE 10 MEQ: 7.46 INJECTION, SOLUTION INTRAVENOUS at 14:53

## 2024-01-24 RX ADMIN — NITROFURANTOIN MONOHYDRATE/MACROCRYSTALS 100 MG: 25; 75 CAPSULE ORAL at 21:20

## 2024-01-24 RX ADMIN — SERTRALINE HYDROCHLORIDE 50 MG: 50 TABLET ORAL at 09:20

## 2024-01-24 RX ADMIN — ENOXAPARIN SODIUM 40 MG: 100 INJECTION SUBCUTANEOUS at 09:20

## 2024-01-24 RX ADMIN — GABAPENTIN 100 MG: 100 CAPSULE ORAL at 13:01

## 2024-01-24 ASSESSMENT — PAIN SCALES - GENERAL
PAINLEVEL_OUTOF10: 9
PAINLEVEL_OUTOF10: 9
PAINLEVEL_OUTOF10: 6
PAINLEVEL_OUTOF10: 6
PAINLEVEL_OUTOF10: 1
PAINLEVEL_OUTOF10: 0

## 2024-01-24 ASSESSMENT — PAIN DESCRIPTION - LOCATION
LOCATION: LEG
LOCATION: ARM
LOCATION: LEG

## 2024-01-24 ASSESSMENT — PAIN DESCRIPTION - DESCRIPTORS
DESCRIPTORS: ACHING

## 2024-01-24 ASSESSMENT — PAIN DESCRIPTION - ORIENTATION
ORIENTATION: LEFT

## 2024-01-24 ASSESSMENT — PAIN - FUNCTIONAL ASSESSMENT: PAIN_FUNCTIONAL_ASSESSMENT: ACTIVITIES ARE NOT PREVENTED

## 2024-01-24 NOTE — CARE COORDINATION
Landy with [facility] to CMs desk. Asking for an expected DC date as pt will need a pre-cert to return. Pt is able to return skilled if pt will need IV antibiotics after DC.

## 2024-01-24 NOTE — PROGRESS NOTES
This nurse notified the hospitalist via perfectserve of the family's request to have Dr Rollins see the patient. Request for consult to Oncology

## 2024-01-24 NOTE — PROGRESS NOTES
Neurosurgery Progress Note  1/24/2024 12:28 PM      Left T6-8 laminectomy, t5-9 percutaneous instrumentation and fusion   POD: 15    Assessment and Plan:   Sepsis- had recent intervention for tumor debulking and thoracic fusion. patient tachycardic, was tachycardic prior to surgery during last admission. Leukocytosis at 15.7 today, has been afebrile this admission. On azythromycin, cefepime, and vancomycin. Blood cultures negative so far. Urine culture positive for staph epidermis, sensitivities pending. MRI of thoracic spine showing no evidence of infection in the operative bed. Sed rate and crp trending down, sed rate 56, crp 112.   Metastatic GI/pancreatobilliary carcinoma- patient had concerns of recurrent stenosis during last admission. Spoke with family about recommendation for palliative care and hospice. Patient wanted to obtain a definitive diagnosis. Had a colonoscopy last admission with biopsy. Recommend Oncology consult prior to patient leaving to re-discuss next steps.  MRI thoracic continues to show severe stenosis from tumor. Spoke with patient about results and that we do not recommend further surgery. Recommend palliative radiation.   Hx of stroke- [place] and has had left upper and lower extremity deficits as a result. Left arm and leg flaccid at baseline. Would get around using a wheelchair and his right arm and leg.   S/P thoracic fusion- recommend incentive spirometer, encourage frequent turns, avoid laying patient on his back directly.     Supervising physician: Nallely Ragland MD.  Dr. Ragland was readily and continuously available by phone for direct consultation regarding the care of this patient.    Dragon dictation may have been used in the writing of this note. Spelling or word errors may have occurred. Please call for clarification if there are concerns.      Subjective:   Admit Date: 1/21/2024  PCP: Vidal Lynch MD    Patient sitting up in bed. Has no complains  at this time. He understands that he should speak with his oncologist about radiation treatment and next steps.    Data:   Scheduled Meds:   nitrofurantoin (macrocrystal-monohydrate)  100 mg Oral 2 times per day    sodium chloride  1,000 mL IntraVENous Once    sodium chloride flush  5-40 mL IntraVENous 2 times per day    enoxaparin  40 mg SubCUTAneous Daily    gabapentin  100 mg Oral 3 times per day    metoprolol tartrate  50 mg Oral BID    tamsulosin  0.4 mg Oral Daily    sertraline  50 mg Oral Daily    pravastatin  20 mg Oral Nightly    traZODone  50 mg Oral Nightly         I/O last 3 completed shifts:  In: 720 [P.O.:720]  Out: 2650 [Urine:2650]  I/O this shift:  In: 2935 [P.O.:360; I.V.:20; IV Piggyback:2555]  Out: 550 [Urine:550]    Intake/Output Summary (Last 24 hours) at 1/24/2024 1228  Last data filed at 1/24/2024 0929  Gross per 24 hour   Intake 3654.97 ml   Output 1000 ml   Net 2654.97 ml     CULTURE results: Invalid input(s): \"BLOOD CULTURE\", \"URINE CULTURE\", \"SURGICAL CULTURE\"  CBC:   Recent Labs     01/22/24  0257 01/23/24  0229 01/24/24  0057   WBC 13.4* 16.9* 15.7*   HGB 8.0* 7.5* 7.6*    474* 492*     BMP:    Recent Labs     01/22/24  0257 01/23/24  0229 01/24/24  0057    137 139   K 3.9 3.5 3.4*    103 104   CO2 22 24 24   BUN 15 12 13   CREATININE 0.4* 0.4* 0.4*   GLUCOSE 111* 120* 109*     Hepatic: No results for input(s): \"AST\", \"ALT\", \"ALB\", \"BILITOT\", \"ALKPHOS\" in the last 72 hours.      IMAGING: available xray, CT, and MRI results reviewed    MRI thoracic w/wo contrast 1/24/24  IMPRESSION:  There has been pedicle screw and hema fusion from T5 through T9 bridging the  pathologic fracture of T7.  There is compression of the T7 vertebral body  with abnormal marrow signal throughout the T7 vertebral body and retropulsion  of the vertebral body into the spinal canal.  There is extension of tumor  along the thecal sac and in the epidural space laterally and posteriorly to  the T7

## 2024-01-24 NOTE — PROGRESS NOTES
V2.0  [facility] Hospitalist Progress Note      Name:  Sebastian Perkins /Age/Sex: 1960  (63 y.o. male)   MRN & CSN:  [id number] & [id number] Encounter Date/Time: 2024 11:04 AM EST    Location:  -A PCP: Vidal Lynch MD       Hospital Day: 4    Assessment and Plan:   Sebastian Perkins is a 63 y.o. male who presents with Sepsis (HCC)     Hospital Problems               Last Modified POA     * (Principal) Sepsis (HCC) 2024 Yes      # Sepsis-unknown etiology  # Diarrhea  -Recent history of laminectomy  -Presented with fever of 101 at the nursing home, has been afebrile since being here  -SIRS criteria: Tachycardia, hypotension, leukocytosis  -Patient with recent intervention for tumor debulking and thoracic fusion.  -Rapid flu and COVID test were negative  -Negative respiratory PCR panel  -UA positive for nitrites and moderate blood.  Urine culture growing Staphylococcus epidermidis.  Unlikely for patient's sepsis.  -Procalcitonin mildly elevated at 0.493  -CT T-spine showing posterior thoracic fusion from T5-T9 for stabilization of pathologic fracture at T7  -Recently completed course of ceftriaxone for UTI on previous admission  -Blood culture negative so far  -Urine culture growing Staphylococcus epidermidis.  Will switch to nitrofurantoin per sensitivities.  -MRI of the thoracic spine negative for infection.    # Acute on chronic anemia  -Hemoglobin 7.8, previously 8.8 3 days ago  Monitor CBC daily, transfuse if hemoglobin less than     # Elevated troponin likely in the setting  -Troponin: 43-->62  - Continue trending troponin     # Metastic GI/pancreatobiliary carcinoma  -Recently underwent laminectomy due to spinal metastases and pathologic fracture  -CT scan  showing atelectasis within the left T7 pedicle extends into adjacent rib as well as loss of central epidural space 2.2 cm right adrenal mass, most likely benign, very large left posterior back enhancing mass measuring 9.1 x 3.2 x 6.6  I have reviewed discharge instructions with the patient and spouse. The patient and spouse verbalized understanding. Patient left ED via Discharge Method: ambulatory to Home with spouse. Opportunity for questions and clarification provided. Patient given 1 scripts. To continue your aftercare when you leave the hospital, you may receive an automated call from our care team to check in on how you are doing. This is a free service and part of our promise to provide the best care and service to meet your aftercare needs.  If you have questions, or wish to unsubscribe from this service please call [phone]. Thank you for Choosing our [facility] Emergency Department. HISTORY: fever TECHNOLOGIST PROVIDED HISTORY: Reason for exam:->fever Reason for Exam: fever FINDINGS: Single view provided. Stable mediastinal silhouette.  Status post thoracic spine posterior fusion. Normal lung volumes with some stable mild right lateral lung base subpleural opacities.  No progressive consolidation.  No interstitial edema.  No pneumothorax or effusion.  No free subdiaphragmatic air.  The visualized bowel gas pattern is normal.  Global decreased bone mineral density.     No evidence of acute pneumonia.       Electronically signed by Harper Sprague MD on 1/24/2024 at 1:29 PM

## 2024-01-25 PROBLEM — R50.9 FEVER: Status: ACTIVE | Noted: 2024-01-25

## 2024-01-25 PROBLEM — E43 SEVERE MALNUTRITION (HCC): Status: ACTIVE | Noted: 2024-01-25

## 2024-01-25 LAB
ANION GAP SERPL CALCULATED.3IONS-SCNC: 11 MMOL/L (ref 7–16)
BASOPHILS ABSOLUTE: 0 K/CU MM
BASOPHILS RELATIVE PERCENT: 0.2 % (ref 0–1)
BUN SERPL-MCNC: 12 MG/DL (ref 6–23)
CALCIUM SERPL-MCNC: 8.3 MG/DL (ref 8.3–10.6)
CHLORIDE BLD-SCNC: 103 MMOL/L (ref 99–110)
CO2: 24 MMOL/L (ref 21–32)
CREAT SERPL-MCNC: 0.3 MG/DL (ref 0.9–1.3)
CRP SERPL HS-MCNC: 68.4 MG/L
DIFFERENTIAL TYPE: ABNORMAL
EOSINOPHILS ABSOLUTE: 0.1 K/CU MM
EOSINOPHILS RELATIVE PERCENT: 0.6 % (ref 0–3)
ERYTHROCYTE SEDIMENTATION RATE: 92 MM/HR (ref 0–20)
GFR SERPL CREATININE-BSD FRML MDRD: >60 ML/MIN/1.73M2
GLUCOSE SERPL-MCNC: 123 MG/DL (ref 70–99)
HCT VFR BLD CALC: 29.1 % (ref 42–52)
HEMOGLOBIN: 8.3 GM/DL (ref 13.5–18)
IMMATURE NEUTROPHIL %: 0.6 % (ref 0–0.43)
LYMPHOCYTES ABSOLUTE: 0.5 K/CU MM
LYMPHOCYTES RELATIVE PERCENT: 2.7 % (ref 24–44)
MCH RBC QN AUTO: 26.6 PG (ref 27–31)
MCHC RBC AUTO-ENTMCNC: 28.5 % (ref 32–36)
MCV RBC AUTO: 93.3 FL (ref 78–100)
MONOCYTES ABSOLUTE: 0.2 K/CU MM
MONOCYTES RELATIVE PERCENT: 1.3 % (ref 0–4)
NUCLEATED RBC %: 0 %
PDW BLD-RTO: 16.1 % (ref 11.7–14.9)
PLATELET # BLD: 491 K/CU MM (ref 140–440)
PMV BLD AUTO: 9.5 FL (ref 7.5–11.1)
POTASSIUM SERPL-SCNC: 3.9 MMOL/L (ref 3.5–5.1)
PROCALCITONIN SERPL-MCNC: 0.6 NG/ML
RBC # BLD: 3.12 M/CU MM (ref 4.6–6.2)
SEGMENTED NEUTROPHILS ABSOLUTE COUNT: 16.1 K/CU MM
SEGMENTED NEUTROPHILS RELATIVE PERCENT: 94.6 % (ref 36–66)
SODIUM BLD-SCNC: 138 MMOL/L (ref 135–145)
TOTAL IMMATURE NEUTOROPHIL: 0.1 K/CU MM
TOTAL NUCLEATED RBC: 0 K/CU MM
WBC # BLD: 17 K/CU MM (ref 4–10.5)

## 2024-01-25 PROCEDURE — 84145 PROCALCITONIN (PCT): CPT

## 2024-01-25 PROCEDURE — 94761 N-INVAS EAR/PLS OXIMETRY MLT: CPT

## 2024-01-25 PROCEDURE — 6360000002 HC RX W HCPCS: Performed by: INTERNAL MEDICINE

## 2024-01-25 PROCEDURE — 6370000000 HC RX 637 (ALT 250 FOR IP): Performed by: INTERNAL MEDICINE

## 2024-01-25 PROCEDURE — 86140 C-REACTIVE PROTEIN: CPT

## 2024-01-25 PROCEDURE — 36415 COLL VENOUS BLD VENIPUNCTURE: CPT

## 2024-01-25 PROCEDURE — 2060000000 HC ICU INTERMEDIATE R&B

## 2024-01-25 PROCEDURE — 85025 COMPLETE CBC W/AUTO DIFF WBC: CPT

## 2024-01-25 PROCEDURE — 85652 RBC SED RATE AUTOMATED: CPT

## 2024-01-25 PROCEDURE — 80048 BASIC METABOLIC PNL TOTAL CA: CPT

## 2024-01-25 PROCEDURE — 99223 1ST HOSP IP/OBS HIGH 75: CPT | Performed by: INTERNAL MEDICINE

## 2024-01-25 PROCEDURE — 2580000003 HC RX 258: Performed by: INTERNAL MEDICINE

## 2024-01-25 PROCEDURE — APPNB180 APP NON BILLABLE TIME > 60 MINS: Performed by: PHYSICIAN ASSISTANT

## 2024-01-25 RX ADMIN — METOPROLOL TARTRATE 50 MG: 50 TABLET, FILM COATED ORAL at 21:03

## 2024-01-25 RX ADMIN — SODIUM CHLORIDE, PRESERVATIVE FREE 10 ML: 5 INJECTION INTRAVENOUS at 08:39

## 2024-01-25 RX ADMIN — SODIUM CHLORIDE, PRESERVATIVE FREE 10 ML: 5 INJECTION INTRAVENOUS at 21:06

## 2024-01-25 RX ADMIN — NITROFURANTOIN MONOHYDRATE/MACROCRYSTALS 100 MG: 25; 75 CAPSULE ORAL at 08:40

## 2024-01-25 RX ADMIN — SERTRALINE HYDROCHLORIDE 50 MG: 50 TABLET ORAL at 08:40

## 2024-01-25 RX ADMIN — GABAPENTIN 100 MG: 100 CAPSULE ORAL at 05:20

## 2024-01-25 RX ADMIN — ACETAMINOPHEN 650 MG: 325 TABLET ORAL at 11:39

## 2024-01-25 RX ADMIN — ENOXAPARIN SODIUM 40 MG: 100 INJECTION SUBCUTANEOUS at 08:40

## 2024-01-25 RX ADMIN — TRAZODONE HYDROCHLORIDE 50 MG: 50 TABLET ORAL at 21:03

## 2024-01-25 RX ADMIN — NITROFURANTOIN MONOHYDRATE/MACROCRYSTALS 100 MG: 25; 75 CAPSULE ORAL at 21:03

## 2024-01-25 RX ADMIN — ACETAMINOPHEN 650 MG: 325 TABLET ORAL at 21:02

## 2024-01-25 RX ADMIN — GABAPENTIN 100 MG: 100 CAPSULE ORAL at 14:56

## 2024-01-25 RX ADMIN — GABAPENTIN 100 MG: 100 CAPSULE ORAL at 21:01

## 2024-01-25 RX ADMIN — TAMSULOSIN HYDROCHLORIDE 0.4 MG: 0.4 CAPSULE ORAL at 08:40

## 2024-01-25 RX ADMIN — METOPROLOL TARTRATE 50 MG: 50 TABLET, FILM COATED ORAL at 08:40

## 2024-01-25 RX ADMIN — PRAVASTATIN SODIUM 20 MG: 10 TABLET ORAL at 21:03

## 2024-01-25 RX ADMIN — ACETAMINOPHEN 650 MG: 325 TABLET ORAL at 05:19

## 2024-01-25 ASSESSMENT — PAIN SCALES - GENERAL
PAINLEVEL_OUTOF10: 3
PAINLEVEL_OUTOF10: 6
PAINLEVEL_OUTOF10: 3
PAINLEVEL_OUTOF10: 6
PAINLEVEL_OUTOF10: 5

## 2024-01-25 ASSESSMENT — PAIN DESCRIPTION - ORIENTATION
ORIENTATION: RIGHT;LEFT
ORIENTATION: LEFT
ORIENTATION: RIGHT;LEFT
ORIENTATION: POSTERIOR

## 2024-01-25 ASSESSMENT — PAIN SCALES - WONG BAKER
WONGBAKER_NUMERICALRESPONSE: 2
WONGBAKER_NUMERICALRESPONSE: 0

## 2024-01-25 ASSESSMENT — PAIN DESCRIPTION - FREQUENCY: FREQUENCY: CONTINUOUS

## 2024-01-25 ASSESSMENT — PAIN DESCRIPTION - LOCATION
LOCATION: HEAD
LOCATION: HEAD;NECK
LOCATION: HEAD
LOCATION: ANKLE

## 2024-01-25 ASSESSMENT — PAIN DESCRIPTION - DESCRIPTORS
DESCRIPTORS: PINS AND NEEDLES
DESCRIPTORS: OTHER (COMMENT)
DESCRIPTORS: ACHING;POUNDING
DESCRIPTORS: ACHING;SORE

## 2024-01-25 ASSESSMENT — PAIN - FUNCTIONAL ASSESSMENT
PAIN_FUNCTIONAL_ASSESSMENT: ACTIVITIES ARE NOT PREVENTED

## 2024-01-25 ASSESSMENT — PAIN DESCRIPTION - PAIN TYPE: TYPE: SURGICAL PAIN;CHRONIC PAIN

## 2024-01-25 ASSESSMENT — PAIN DESCRIPTION - ONSET: ONSET: ON-GOING

## 2024-01-25 NOTE — CONSULTS
Hematology Oncology Inpatient Consult    Patient Name:  Sebastian Perkins  Patient :  1960  Patient MRN:  [id number]     Primary Oncologist: Jose Rollins MD  Referring Provider: Vidal Lynch MD     Date of Service: 2024      Reason for Consult: metastatic carcinoma      Chief Complaint:    Chief Complaint   Patient presents with    Fever    Hypotension     Principal Problem:    Sepsis (HCC)  Resolved Problems:    * No resolved hospital problems. *      HPI:   Sebastian Perkins is a 63 y.o. male with a history of CVA with chronic residual [facility]ided [facility] sided weakness and was ultimately found to have metastatic disease with lytic lesions throughout skeleton including large L scapular mass, epidural tumor invasion and is s/p decompression surgery with Dr Ragland with tumor debulking and L T6-7 laminectomy, T5-9 fusion. He does continue to have severe thoracic stenosis.  He is now 16 days post op and would be appropriate for radiation however had had recurrent hospitalizations. Pathology from surgery resulted as metastatic carcinoma of unknown origin, likely upper GI however could not exclude lower GI or lung.  EGD 2024 was unrevealing for primary malignancy.  He never was able to complete colonoscopy. [facility] was sent and per their testing portal is in progress. Have advised pt that this testing does not identify 100% of cases.    Tumor markers on last admission:   CA 19-9: 4   AFP: 2  PSA 3.33  CEA 31.8    This admission he presented with fever and hypotension and is being treated with antimicrobials. He has been afebrile since admission and stable. Possible UTI, otherwise unclear source. Cr at baseline 0.3, LFT unremarkable aside from elevated  and hypoalbuminemia to 2.6. Has had persistent leukocytosis since early  most recently.  Not on any steroids. Hgb stable at 8.3. Stable thrombocytosis 491k.     On exam he complains of  earlier this month. CRP elevated but trending down. Will monitor.     I have independently evaluated and examined this patient today.  I have reviewed radiologic and biochemical tests on this patient. Management Plan is developed mutually with Magnolia Bernabe PA-C. I have reviewed above note and agree with assessment and plan    Thank you for allowing me to participate in the care of this very pleasant patient.  Labs, rationale, and plan of care all discussed in depth with patient and questions and concerns addressed.

## 2024-01-25 NOTE — PROGRESS NOTES
Comprehensive Nutrition Assessment    Type and Reason for Visit:  Reassess    Nutrition Recommendations/Plan:   Will order frozen oral supplement (Magic Cup) BID with standard high calorie oral nutrition supplement BID   Endorse adequate PO intakes/ONS Intake and record in I/O; will add snacks  Encourage proper hydration/fluids intake  Monitor PO intakes, GI status, weights, fluids, labs, lytes, glucose, and plan of care      Malnutrition Assessment:  Malnutrition Status:  Severe malnutrition (01/25/24 1240)    Context:  Acute Illness     Findings of the 6 clinical characteristics of malnutrition:  Energy Intake:  50% or less of estimated energy requirements for 5 or more days  Weight Loss:  Greater than 2% over 1 week (6% in 1.5 weeks)     Body Fat Loss:  No significant body fat loss Orbital, Triceps, Fat Overlying Ribs, Buccal region   Muscle Mass Loss:  No significant muscle mass loss Clavicles (pectoralis & deltoids), Temples (temporalis), Thigh (quadraceps), Calf (gastrocnemius), Hand (interosseous), Scapula (trapezius)  Fluid Accumulation:  No significant fluid accumulation Extremities   Strength:  Not Performed    Nutrition Assessment:    Pt on regular diet, poor po intake x 5 days, has not tried supplements, recommend to trial over ice. Pt agreed to start snacks for increase meal frequency. No significant wasting per NFPE yet pt still meets criteria for acute malnutrition. Plans for palliative radiation, pt will have increased nutrient needs. Endorsed additional options for meals, encourage better nutrition. Follow as high nutrition risk.    Nutrition Related Findings:    Likes ice cream and requests for PBJ sandwiche. Sitting up in bed. POD#17 T6-8 laminectomy for resection of tumor at T7, posterior T5-9 instrumentation and fusion on 1/8/2024. MRI shows severe stenosis from tumor. Wound Type: Surgical Incision, Deep Tissue Injury, Multiple (non healing surgical wound)       Current Nutrition Intake &  Therapies:    Average Meal Intake: 0%, %  Average Supplements Intake: None Ordered  ADULT DIET; Regular  ADULT ORAL NUTRITION SUPPLEMENT; Breakfast, Dinner; Standard High Calorie/High Protein Oral Supplement    Anthropometric Measures:  Height: 162.6 cm (5' 4\")  Ideal Body Weight (IBW): 130 lbs (59 kg)    Admission Body Weight: 65.2 kg (143 lb 11.8 oz) (1/22 first measured wt)  Current Body Weight: 65.5 kg (144 lb 6.4 oz), 111.1 % IBW. Weight Source: Bed Scale  Current BMI (kg/m2): 24.8  Usual Body Weight: 70 kg (154 lb 5.2 oz) (1/15/24)  % Weight Change (Calculated): -6.4  Weight Adjustment For: No Adjustment                 BMI Categories: Normal Weight (BMI 22.0 to 24.9) age over 65    Estimated Daily Nutrient Needs:  Energy Requirements Based On: Kcal/kg  Weight Used for Energy Requirements: Current  Energy (kcal/day): 7879-0930 (25-30 kcals/kg)  Weight Used for Protein Requirements: Ideal  Protein (g/day): 71-89 (1.2-1.5 g/kg)  Method Used for Fluid Requirements: 1 ml/kcal  Fluid (ml/day): 2204-0066    Nutrition Diagnosis:   Severe malnutrition, In context of acute illness or injury related to inadequate protein-energy intake, increase demand for energy/nutrients as evidenced by weight loss greater than or equal to 2% in 1 week, poor intake prior to admission, intake 26-50%    Nutrition Interventions:   Food and/or Nutrient Delivery: Continue Current Diet, Continue Oral Nutrition Supplement, Start Oral Nutrition Supplement  Nutrition Education/Counseling: No recommendation at this time  Coordination of Nutrition Care: Continue to monitor while inpatient, Coordination of Care  Plan of Care discussed with: pt    Goals:     Goals: Meet at least 75% of estimated needs       Nutrition Monitoring and Evaluation:   Behavioral-Environmental Outcomes: None Identified  Food/Nutrient Intake Outcomes: Food and Nutrient Intake, Supplement Intake  Physical Signs/Symptoms Outcomes: Biochemical Data, Meal Time Behavior,

## 2024-01-25 NOTE — DISCHARGE INSTR - COC
Continuity of Care Form    Patient Name: Sebastian Perkins   :  1960  MRN:  [id number]    Admit date:  2024  Discharge date:  ***    Code Status Order: Full Code   Advance Directives:     Admitting Physician:  Pili Thayer MD  PCP: Vidal Lynch MD    Discharging Nurse: ***  Discharging Hospital Unit/Room#: -A  Discharging Unit Phone Number: ***    Emergency Contact:   Extended Emergency Contact Information  Primary Emergency Contact: Jaleesa PHILLIPS  Home Phone: [phone]  Relation: Brother/Sister  Secondary Emergency Contact: Ge Gunn  Home Phone: [phone]  Mobile Phone: [phone]  Relation: Other    Past Surgical History:  Past Surgical History:   Procedure Laterality Date    LUMBAR SPINE SURGERY N/A 2024    LEFT T6-8 LAMINECTOMY FOR RESECTION OF EXTRADURAL TUMOR, T5-9 PERCUTANEOUS INSTRUMENTATION performed by Nallely Ragland MD at [facility] OR    UPPER GASTROINTESTINAL ENDOSCOPY N/A 2024    EGD BIOPSY performed by Hazel Zhou MD at [facility] ENDOSCOPY       Immunization History:   Immunization History   Administered Date(s) Administered    COVID-19, PFIZER Bivalent, DO NOT Dilute, (age 12y+), IM, 30 mcg/0.3 mL 2022    COVID-19, PFIZER GRAY top, DO NOT Dilute, (age 12 y+), IM, 30 mcg/0.3 mL 2022    COVID-19, PFIZER PURPLE top, DILUTE for use, (age 12 y+), 30mcg/0.3mL 2020, 2021, 2021       Active Problems:  Patient Active Problem List   Diagnosis Code    Weakness R53.1    Metastatic malignant neoplasm (HCC) C79.9    T7 vertebral fracture (HCC) S22.069A    Spinal cord compression due to malignant neoplasm metastatic to spine (HCC) G95.29, C79.51    Septicemia (HCC) A41.9    Moderate malnutrition (HCC) E44.0    Sepsis (HCC) A41.9    Severe malnutrition (HCC) E43       Isolation/Infection:   Isolation            No Isolation          Patient Infection Status       None to display                     Nurse Assessment:  Last Vital Signs: BP 98/64

## 2024-01-25 NOTE — PLAN OF CARE
Problem: Safety - Adult  Goal: Free from fall injury  Outcome: Progressing     Problem: Discharge Planning  Goal: Discharge to home or other facility with appropriate resources  Outcome: Progressing     Problem: Pain  Goal: Verbalizes/displays adequate comfort level or baseline comfort level  Outcome: Progressing     Problem: Infection - Adult  Goal: Absence of infection at discharge  Outcome: Progressing  Goal: Absence of infection during hospitalization  Outcome: Progressing  Goal: Absence of fever/infection during anticipated neutropenic period  Outcome: Progressing     Problem: Hematologic - Adult  Goal: Maintains hematologic stability  Outcome: Progressing     Problem: Skin/Tissue Integrity  Goal: Absence of new skin breakdown  Description: 1.  Monitor for areas of redness and/or skin breakdown  2.  Assess vascular access sites hourly  3.  Every 4-6 hours minimum:  Change oxygen saturation probe site  4.  Every 4-6 hours:  If on nasal continuous positive airway pressure, respiratory therapy assess nares and determine need for appliance change or resting period.  Outcome: Progressing     Problem: ABCDS Injury Assessment  Goal: Absence of physical injury  Outcome: Progressing     Problem: Neurosensory - Adult  Goal: Achieves stable or improved neurological status  Outcome: Progressing  Goal: Absence of seizures  Outcome: Progressing  Goal: Remains free of injury related to seizures activity  Outcome: Progressing  Goal: Achieves maximal functionality and self care  Outcome: Progressing     Problem: Respiratory - Adult  Goal: Achieves optimal ventilation and oxygenation  Outcome: Progressing     Problem: Cardiovascular - Adult  Goal: Maintains optimal cardiac output and hemodynamic stability  Outcome: Progressing  Goal: Absence of cardiac dysrhythmias or at baseline  Outcome: Progressing     Problem: Skin/Tissue Integrity - Adult  Goal: Skin integrity remains intact  Outcome: Progressing  Goal: Incisions, wounds,

## 2024-01-25 NOTE — TELEPHONE ENCOUNTER
The patient's sister will  come to the Cancer center tomorrow 1/26/24 @ 1200 to meet with the providers

## 2024-01-25 NOTE — PROGRESS NOTES
V2.0  [facility] Hospitalist Progress Note      Name:  Sebastian Perkins /Age/Sex: 1960  (63 y.o. male)   MRN & CSN:  [id number] & [id number] Encounter Date/Time: 2024 11:04 AM EST    Location:  -A PCP: Vidal Lynch MD       Hospital Day: 5    Assessment and Plan:   Sebastian Perkins is a 63 y.o. male who presents with Sepsis (HCC)     Hospital Problems               Last Modified POA     * (Principal) Sepsis (HCC) 2024 Yes      # Sepsis-unknown etiology  # Diarrhea  -Recent history of laminectomy  -Presented with fever of 101 at the nursing home, has been afebrile since being here  -SIRS criteria: Tachycardia, hypotension, leukocytosis  -Patient with recent intervention for tumor debulking and thoracic fusion.  -Rapid flu and COVID test were negative  -Negative respiratory PCR panel  -UA positive for nitrites and moderate blood.  Urine culture growing Staphylococcus epidermidis.  Unlikely for patient's sepsis.  -Procalcitonin mildly elevated at 0.493  -CT T-spine showing posterior thoracic fusion from T5-T9 for stabilization of pathologic fracture at T7  -Recently completed course of ceftriaxone for UTI on previous admission  -Blood culture negative so far  -Urine culture growing Staphylococcus epidermidis.  Will switch to nitrofurantoin per sensitivities.  -MRI of the thoracic spine negative for infection.    # Acute on chronic anemia  -Hemoglobin 7.8 on presentation, stable   Monitor CBC daily, transfuse if hemoglobin less than 7     # Elevated troponin likely in the setting  -Troponin: 43-->62  - Continue trending troponin     # Metastic GI/pancreatobiliary carcinoma  -Recently underwent laminectomy due to spinal metastases and pathologic fracture  -CT scan  showing atelectasis within the left T7 pedicle extends into adjacent rib as well as loss of central epidural space 2.2 cm right adrenal mass, most likely benign, very large left posterior back enhancing mass measuring 9.1 x 3.2 x 6.6  exclude metastasis. Very large left posterior upper back enhancing mass measuring 9.1 x 8.2 x 6.6 cm.  The mass encases the body of the scapula and is compatible with a large metastasis.     XR CHEST PORTABLE    Result Date: 1/21/2024  EXAMINATION: ONE XRAY VIEW OF THE CHEST 1/21/2024 11:40 am COMPARISON: Thoracic spine radiograph 119 in 24. CT chest 01/07/2024. Chest radiograph 01/06/2024. HISTORY: ORDERING SYSTEM PROVIDED HISTORY: fever TECHNOLOGIST PROVIDED HISTORY: Reason for exam:->fever Reason for Exam: fever FINDINGS: Single view provided. Stable mediastinal silhouette.  Status post thoracic spine posterior fusion. Normal lung volumes with some stable mild right lateral lung base subpleural opacities.  No progressive consolidation.  No interstitial edema.  No pneumothorax or effusion.  No free subdiaphragmatic air.  The visualized bowel gas pattern is normal.  Global decreased bone mineral density.     No evidence of acute pneumonia.       Electronically signed by Harper Sprague MD on 1/25/2024 at 11:38 AM

## 2024-01-25 NOTE — PROGRESS NOTES
01/25/24 1030   Encounter Summary   Encounter Overview/Reason  Initial Encounter   Service Provided For: Patient   Referral/Consult From: [facility]   Support System Spouse;Family members   Last Encounter  01/25/24  (Patient laying in bed, solemn, wanted me to take him out for a smoke; I declined.  Pt. is a man of farhana, believes in God. A time of prayer was observed. Pt. informed how to contact .)   Complexity of Encounter Low   Begin Time 1025   End Time  1033   Total Time Calculated 8 min   Spiritual/Emotional needs   Type Spiritual Support;Other (comment)   Assessment/Intervention/Outcome   Assessment Calm;Coping   Intervention Active listening;Explored/Affirmed feelings, thoughts, concerns;Nurtured Hope;Prayer (assurance of)/[place];Sustaining Presence/Ministry of presence   Outcome Acceptance;Comfort;Coping;Engaged in conversation;Expressed feelings, needs, and concerns;Expressed Gratitude   Plan and Referrals   Plan/Referrals Continue Support (comment)

## 2024-01-25 NOTE — CARE COORDINATION
CM called Landy with AV and asked her to initiate the pre-cert today. Pt likely ready for discharge in a couple of days.

## 2024-01-26 ENCOUNTER — TELEPHONE (OUTPATIENT)
Dept: ONCOLOGY | Age: 64
End: 2024-01-26

## 2024-01-26 VITALS
HEART RATE: 81 BPM | OXYGEN SATURATION: 95 % | HEIGHT: 64 IN | TEMPERATURE: 98.3 F | DIASTOLIC BLOOD PRESSURE: 73 MMHG | BODY MASS INDEX: 24.65 KG/M2 | RESPIRATION RATE: 13 BRPM | SYSTOLIC BLOOD PRESSURE: 94 MMHG | WEIGHT: 144.4 LBS

## 2024-01-26 LAB
ANION GAP SERPL CALCULATED.3IONS-SCNC: 12 MMOL/L (ref 7–16)
BASOPHILS ABSOLUTE: 0.1 K/CU MM
BASOPHILS RELATIVE PERCENT: 0.3 % (ref 0–1)
BUN SERPL-MCNC: 11 MG/DL (ref 6–23)
CALCIUM SERPL-MCNC: 8.2 MG/DL (ref 8.3–10.6)
CHLORIDE BLD-SCNC: 104 MMOL/L (ref 99–110)
CO2: 24 MMOL/L (ref 21–32)
CREAT SERPL-MCNC: 0.3 MG/DL (ref 0.9–1.3)
CULTURE: NORMAL
CULTURE: NORMAL
DIFFERENTIAL TYPE: ABNORMAL
EOSINOPHILS ABSOLUTE: 0.5 K/CU MM
EOSINOPHILS RELATIVE PERCENT: 2.7 % (ref 0–3)
GFR SERPL CREATININE-BSD FRML MDRD: >60 ML/MIN/1.73M2
GLUCOSE SERPL-MCNC: 108 MG/DL (ref 70–99)
HCT VFR BLD CALC: 27 % (ref 42–52)
HEMOGLOBIN: 7.9 GM/DL (ref 13.5–18)
IMMATURE NEUTROPHIL %: 0.6 % (ref 0–0.43)
LYMPHOCYTES ABSOLUTE: 0.7 K/CU MM
LYMPHOCYTES RELATIVE PERCENT: 4.2 % (ref 24–44)
Lab: NORMAL
Lab: NORMAL
MCH RBC QN AUTO: 26.9 PG (ref 27–31)
MCHC RBC AUTO-ENTMCNC: 29.3 % (ref 32–36)
MCV RBC AUTO: 91.8 FL (ref 78–100)
MONOCYTES ABSOLUTE: 0.5 K/CU MM
MONOCYTES RELATIVE PERCENT: 3.1 % (ref 0–4)
NUCLEATED RBC %: 0 %
PDW BLD-RTO: 16.1 % (ref 11.7–14.9)
PLATELET # BLD: 477 K/CU MM (ref 140–440)
PMV BLD AUTO: 9.7 FL (ref 7.5–11.1)
POTASSIUM SERPL-SCNC: 3.6 MMOL/L (ref 3.5–5.1)
RBC # BLD: 2.94 M/CU MM (ref 4.6–6.2)
SEGMENTED NEUTROPHILS ABSOLUTE COUNT: 14.8 K/CU MM
SEGMENTED NEUTROPHILS RELATIVE PERCENT: 89.1 % (ref 36–66)
SODIUM BLD-SCNC: 140 MMOL/L (ref 135–145)
SPECIMEN: NORMAL
SPECIMEN: NORMAL
TOTAL IMMATURE NEUTOROPHIL: 0.1 K/CU MM
TOTAL NUCLEATED RBC: 0 K/CU MM
WBC # BLD: 16.6 K/CU MM (ref 4–10.5)

## 2024-01-26 PROCEDURE — 6360000002 HC RX W HCPCS: Performed by: INTERNAL MEDICINE

## 2024-01-26 PROCEDURE — 2700000000 HC OXYGEN THERAPY PER DAY

## 2024-01-26 PROCEDURE — 99232 SBSQ HOSP IP/OBS MODERATE 35: CPT | Performed by: INTERNAL MEDICINE

## 2024-01-26 PROCEDURE — 6370000000 HC RX 637 (ALT 250 FOR IP): Performed by: INTERNAL MEDICINE

## 2024-01-26 PROCEDURE — 94761 N-INVAS EAR/PLS OXIMETRY MLT: CPT

## 2024-01-26 PROCEDURE — 2580000003 HC RX 258: Performed by: INTERNAL MEDICINE

## 2024-01-26 PROCEDURE — 80048 BASIC METABOLIC PNL TOTAL CA: CPT

## 2024-01-26 PROCEDURE — 85025 COMPLETE CBC W/AUTO DIFF WBC: CPT

## 2024-01-26 PROCEDURE — 36415 COLL VENOUS BLD VENIPUNCTURE: CPT

## 2024-01-26 RX ORDER — NITROFURANTOIN 25; 75 MG/1; MG/1
100 CAPSULE ORAL EVERY 12 HOURS SCHEDULED
Qty: 9 CAPSULE | Refills: 0 | Status: SHIPPED | OUTPATIENT
Start: 2024-01-26 | End: 2024-01-31

## 2024-01-26 RX ORDER — PRAVASTATIN SODIUM 20 MG
20 TABLET ORAL NIGHTLY
Qty: 30 TABLET | Refills: 3 | Status: SHIPPED | OUTPATIENT
Start: 2024-01-26

## 2024-01-26 RX ADMIN — ENOXAPARIN SODIUM 40 MG: 100 INJECTION SUBCUTANEOUS at 09:01

## 2024-01-26 RX ADMIN — NITROFURANTOIN MONOHYDRATE/MACROCRYSTALS 100 MG: 25; 75 CAPSULE ORAL at 09:00

## 2024-01-26 RX ADMIN — ACETAMINOPHEN 650 MG: 325 TABLET ORAL at 06:20

## 2024-01-26 RX ADMIN — SERTRALINE HYDROCHLORIDE 50 MG: 50 TABLET ORAL at 09:01

## 2024-01-26 RX ADMIN — METOPROLOL TARTRATE 50 MG: 50 TABLET, FILM COATED ORAL at 09:01

## 2024-01-26 RX ADMIN — TAMSULOSIN HYDROCHLORIDE 0.4 MG: 0.4 CAPSULE ORAL at 09:01

## 2024-01-26 RX ADMIN — ACETAMINOPHEN 650 MG: 325 TABLET ORAL at 12:33

## 2024-01-26 RX ADMIN — SODIUM CHLORIDE, PRESERVATIVE FREE 10 ML: 5 INJECTION INTRAVENOUS at 09:01

## 2024-01-26 RX ADMIN — GABAPENTIN 100 MG: 100 CAPSULE ORAL at 06:20

## 2024-01-26 ASSESSMENT — PAIN - FUNCTIONAL ASSESSMENT: PAIN_FUNCTIONAL_ASSESSMENT: ACTIVITIES ARE NOT PREVENTED

## 2024-01-26 ASSESSMENT — PAIN SCALES - GENERAL
PAINLEVEL_OUTOF10: 3
PAINLEVEL_OUTOF10: 6

## 2024-01-26 ASSESSMENT — PAIN DESCRIPTION - DESCRIPTORS: DESCRIPTORS: TINGLING

## 2024-01-26 ASSESSMENT — PAIN DESCRIPTION - LOCATION
LOCATION: BACK
LOCATION: HIP

## 2024-01-26 ASSESSMENT — PAIN DESCRIPTION - ORIENTATION: ORIENTATION: RIGHT;LEFT

## 2024-01-26 NOTE — PROGRESS NOTES
Physician Progress Note      PATIENT:               ASTER BRICEÑO  CSN #:                  [id number]  :                       1960  ADMIT DATE:       2024 11:07 AM  DISCH DATE:  RESPONDING  PROVIDER #:        Harper Sprague MD          QUERY TEXT:    Patient admitted with sepsis. Documentation reflects possible UTI in H&P.  If   possible, please document in the progress notes and discharge summary if UTI   was:    The medical record reflects the following:  Risk Factors: Recent UTI  Clinical Indicators: H&P \"Sepsis 2/2 possible UTI\",  progress note \"-UA   positive for nitrites and moderate blood.  Urine culture growing   Staphylococcus epidermidis.  Unlikely for patient's sepsis.\"  Treatment: IV Rocephin, cefepime and IV vanc, changed to PO Macrobid, urine   Cx.    Thank you,  Mckenna Clement BSN, RN, [facility] [phone]  Options provided:  -- UTI confirmed after study  -- UTI ruled out after study  -- Other - I will add my own diagnosis  -- Disagree - Not applicable / Not valid  -- Disagree - Clinically unable to determine / Unknown  -- Refer to Clinical Documentation Reviewer    PROVIDER RESPONSE TEXT:    UTI confirmed after study.    Query created by: Mckenna Clement on 2024 8:31 AM      Electronically signed by:  Harper Sprague MD 2024 12:23 PM

## 2024-01-26 NOTE — CARE COORDINATION
CM received VM from [facility] with AV. Authorization came back. PS to Dr Sprague to inform her.      Pt is being discharged to:    [facility]     Call report to [phone]     Complete & sign the TRIN[facility]AD then fax to [phone]    Place AVS & any scripts in the envelope that is in the soft chart.  This is to go with the pt to the facility    [facility],  at 13:00   [phone]  After 5 pm & weekends - [phone]    Sister, [place], notified    Facility notified    JEFF Merida aware

## 2024-01-26 NOTE — FLOWSHEET NOTE
D/C report called to Artis at [facility] paperwork faxed and d/c paperwork included with patient packet. [facility] scheduled for 1PM.

## 2024-01-26 NOTE — PROGRESS NOTES
Hematology Oncology Inpatient Consult    Patient Name:  Sebastian Perkins  Patient :  1960  Patient MRN:  [id number]     Primary Oncologist: Jose Rollins MD  Referring Provider: Vidal Lynch MD    Sebastian Perkins is a 63 y.o. male with a history of CVA with chronic residual L sided weakness [facility]ided weakness and was ultimately found to have metastatic disease with lytic lesions throughout skeleton including large L scapular mass, epidural tumor invasion and is s/p decompression surgery with Dr Ragland with tumor debulking and L T6-7 laminectomy, T5-9 fusion. He does continue to have severe thoracic stenosis.  He is now 16 days post op and would be appropriate for radiation however had had recurrent hospitalizations. Pathology from surgery resulted as metastatic carcinoma of unknown origin, likely upper GI however could not exclude lower GI or lung.  EGD 2024 was unrevealing for primary malignancy.  He never was able to complete colonoscopy. Caris GPSai was sent and per their testing portal is in progress. Have advised pt that this testing does not identify 100% of cases.    Tumor markers on last admission:   CA 19-9: 4   AFP: 2  PSA 3.33  CEA 31.8    This admission he presented with fever and hypotension and is being treated with antimicrobials. He has been afebrile since admission and stable. Possible UTI, otherwise unclear source. Cr at baseline 0.3, LFT unremarkable aside from elevated  and hypoalbuminemia to 2.6. Has had persistent leukocytosis since early  most recently.  Not on any steroids. Hgb stable at 8.3. Stable thrombocytosis 491k.     On exam he complains of pain in his L shoulder with limited ROM. Continues to have very little movement in his R side, L side mostly flaccid.  Denies fever/chills. Denies CP/SOB.  Denies abdominal pain/change in bowel or bladder habits. Denies new HA/dizziness.  Reviewed workup and plan to date.

## 2024-01-26 NOTE — PLAN OF CARE
Problem: Safety - Adult  Goal: Free from fall injury  1/25/2024 2322 by Yanna Nina RN  Outcome: Progressing  1/25/2024 1059 by Kathi Pena RN  Outcome: Progressing     Problem: Discharge Planning  Goal: Discharge to home or other facility with appropriate resources  1/25/2024 2322 by Yanna Nina RN  Outcome: Progressing  1/25/2024 1059 by Kathi Pena RN  Outcome: Progressing     Problem: Pain  Goal: Verbalizes/displays adequate comfort level or baseline comfort level  1/25/2024 2322 by Yanna Nina RN  Outcome: Progressing  1/25/2024 1059 by Kathi Pena RN  Outcome: Progressing     Problem: Infection - Adult  Goal: Absence of infection at discharge  1/25/2024 2322 by Yanna Nina RN  Outcome: Progressing  1/25/2024 1059 by Kathi Pena RN  Outcome: Progressing  Goal: Absence of infection during hospitalization  1/25/2024 2322 by Yanna Nina RN  Outcome: Progressing  1/25/2024 1059 by Kathi Pena RN  Outcome: Progressing  Goal: Absence of fever/infection during anticipated neutropenic period  1/25/2024 2322 by Yanna Nina RN  Outcome: Progressing  1/25/2024 1059 by Kathi Pena RN  Outcome: Progressing     Problem: Hematologic - Adult  Goal: Maintains hematologic stability  1/25/2024 2322 by Yanna Nina RN  Outcome: Progressing  1/25/2024 1059 by Kathi Pena RN  Outcome: Progressing     Problem: Skin/Tissue Integrity  Goal: Absence of new skin breakdown  Description: 1.  Monitor for areas of redness and/or skin breakdown  2.  Assess vascular access sites hourly  3.  Every 4-6 hours minimum:  Change oxygen saturation probe site  4.  Every 4-6 hours:  If on nasal continuous positive airway pressure, respiratory therapy assess nares and determine need for appliance change or resting period.  1/25/2024 2322 by Yanna Nina RN  Outcome: Progressing  1/25/2024 1059 by Kathi Pena RN  Outcome: Progressing     Problem: ABCDS Injury Assessment  Goal:

## 2024-01-26 NOTE — TELEPHONE ENCOUNTER
Called [facility] & was transferred to Transportation Ext. [phone]. LVM with all information including appt. For Dr. Rollins & Dr. Bakari GONZALEZ appt. I requested a call back to confirm if transportation is set up.

## 2024-01-30 ENCOUNTER — TELEPHONE (OUTPATIENT)
Dept: ONCOLOGY | Age: 64
End: 2024-01-30

## 2024-01-30 NOTE — TELEPHONE ENCOUNTER
Called [facility] [phone] Ext. [phone] & spoke with Darcie in transportation. Gave her SIM appt. 2/2/24 10:00AM. Dr. Rollins does not want to see patient until Radiation is completed. Cancelled Dr. Rollins appointment.

## 2024-02-01 ENCOUNTER — CLINICAL DOCUMENTATION (OUTPATIENT)
Dept: ONCOLOGY | Age: 64
End: 2024-02-01

## 2024-02-01 NOTE — PROGRESS NOTES
Physician recommending to see patient to review Caris results and discuss tx options. Patient is scheduled for SIM tomorrow 02/02/2024. Patient added to schedule for OV @ 0945. Called [facility] @ [phone] and spoke with staff who will relay message to Darcie in transportation of earlier appointment time. This RN direct number provided should any questions present.

## 2024-02-01 NOTE — PROGRESS NOTES
SIM appointment cancelled per radiation d/t SNF not being able to transport patient because he cannot sit in wheelchair which is the only way they are allowed to transfer patient. Called [facility] @ [phone]. Call transferred to [facility] in transportation to confirm and notify that OV has also been cancelled. Physician aware and requesting to speak with patient's sisters regarding POC and to discuss tx options. Attempted to call Jaleesa webb @ [phone] to update; however, no answer. Advised to call office to schedule appointment.

## 2024-02-01 NOTE — PROGRESS NOTES
Physician Progress Note      PATIENT:               ASTER BRICEÑO  CSN #:                  [id number]  :                       1960  ADMIT DATE:       2024 10:11 AM  DISCH DATE:        2024 10:47 AM  RESPONDING  PROVIDER #:        Mat Willoughby MD          QUERY TEXT:    Patient admitted with Thoracic Metastatic adenocarcinoma.  Noted documentation   of septicemia per cardiology note on 1/10/24 and Gastroenterology note on   1/15/24 and SIRS per Internal Medicine on 24.  If possible, please   document in progress notes and discharge summary if you are evaluating and /or   treating any of the following:    The medical record reflects the following:  Risk Factors: Epidural Mass; T7 compression FX due to metastasis.  Clinical Indicators:  1/10/24: Cardiology progress note: Septicemia  24: Progress note per query: UTI was ruled out after study.  24: Per Internal Medicine: SIRS 2/4 on admission: Hypotension likely in   the setting of spinal shock.  1/15/24: Per Gastroenterology: Septicemia  24: WBC: 15.9; heart rate 112-128;  24: WBC 14.4; Heart rate 110-130  24: Procalcitonin 0.447; CRP 79.4  24: Urine culture: >50,000 mixed skin/urogenital francis  24: Blood culture: no growth  Treatment: Rocephin IV; Urine culture; laboratory studies; Urine and blood   cultures; imaging; IV fluid bolus;    Thank You,  Jojo MACHADON, R.N.  Clinical Documentation Improvement  [phone]  Options provided:  -- SIRS of non-infectious origin confirmed and Sepsis ruled out  -- SIRS of non-non infectious origin ruled out and Sepsis confirmed due to,   please specify etiology  -- Other - I will add my own diagnosis  -- Disagree - Not applicable / Not valid  -- Disagree - Clinically unable to determine / Unknown  -- Refer to Clinical Documentation Reviewer    PROVIDER RESPONSE TEXT:    After study, SIRS of non-infectious Origin confirmed and Sepsis ruled out.    Query created by:

## 2024-02-08 NOTE — OP NOTE
Colonoscopy was cancelled as patient had solid stool coming out.    Dr Potts,    Patient calling wanting your thoughts or opinions - any other recommendations or follow cardiology suggestions     History  Patient had Blood clots lower left leg  ICU [facility] for [facility]    Put on Eliquis  Developed lymphedema, Afib (which has resolved for now)    Patient has had Hives for about a month, located on both arms and lower left ankle which is where blood clot was located - LLL  Rash is not spreading but not improving  He states it is very itchy     [facility]  Per patient cardiology is  thinking one of two things    1) possible reaction to Eliquis - cardiology wants to see if antihistamine takes care of it  OR  2) may have to be put on alternative blood thinner    Cardiology suggested starting antihistamine     Benadryl, started yesterday

## (undated) DEVICE — BLADE SCALPEL STERILE NO 10

## (undated) DEVICE — NEEDLE PEDCL ACCS PK1002

## (undated) DEVICE — ELECTRODE ES L2.75IN S STL INSUL BLDE W/ SL EDGE

## (undated) DEVICE — GLOVE SURG SZ 6 L12IN FNGR THK75MIL WHT LTX POLYMER BEAD

## (undated) DEVICE — APPLICATOR MEDICATED 26 CC SOLUTION HI LT ORNG CHLORAPREP

## (undated) DEVICE — SUTURE SZ 0 27IN 5/8 CIR UR-6  TAPER PT VIOLET ABSRB VICRYL J603H

## (undated) DEVICE — PACK,BASIC,IX: Brand: MEDLINE

## (undated) DEVICE — SURGIFOAM SPNG SZ 100

## (undated) DEVICE — SYRINGE IRRIG 60ML SFT PLIABLE BLB EZ TO GRP 1 HND USE W/

## (undated) DEVICE — ADHESIVE SKIN CLSR 0.7ML TOP DERMBND ADV

## (undated) DEVICE — SPONGE LAP W18XL18IN WHT COT 4 PLY FLD STRUNG RADPQ DISP ST 2 PER PACK

## (undated) DEVICE — GLOVE SURG SZ 7 CRM LTX FREE POLYISOPRENE POLYMER BEAD ANTI

## (undated) DEVICE — NEEDLE SPNL 20GA L3.5IN YEL HUB S STL REG WALL FIT STYL W/

## (undated) DEVICE — BIPOLAR IRRIGATOR INTEGRATED TUBING AND BIPOLAR CORD SET, DISPOSABLE

## (undated) DEVICE — SUTURE VCRL SZ 0 L18IN ABSRB UD L36MM CT-1 1/2 CIR J840D

## (undated) DEVICE — SUTURE VCRL SZ 3-0 L18IN ABSRB UD L26MM SH 1/2 CIR J864D

## (undated) DEVICE — DRAPE SHEET ULTRAGARD: Brand: MEDLINE

## (undated) DEVICE — GOWN,SIRUS,POLYRNF,BRTHSLV,XLN/XL,20/CS: Brand: MEDLINE

## (undated) DEVICE — ENDOSCOPY KIT: Brand: MEDLINE INDUSTRIES, INC.

## (undated) DEVICE — SSC BONE WAX: Brand: SSC BONE WAX

## (undated) DEVICE — COUNTER NDL 30 COUNT FOAM STRP SGL MAG

## (undated) DEVICE — DRAPE,U/ SHT,SPLIT,PLAS,STERIL: Brand: MEDLINE

## (undated) DEVICE — PAD,NON-ADHERENT,3X8,STERILE,LF,1/PK: Brand: MEDLINE

## (undated) DEVICE — BIPOLAR IRRIGATOR INTEGRATED TUBING AND BIPOLAR CORD SET, DISPOSABLE, UNITIZED PLUG

## (undated) DEVICE — SUTURE VCRL SZ 2-0 L18IN ABSRB VLT L26MM SH 1/2 CIR J775D

## (undated) DEVICE — SPONGE,NEURO,0.5"X3",XR,STRL,LF,10/PK: Brand: MEDLINE

## (undated) DEVICE — DRAPE,UTILITY,XL,4/PK,STERILE: Brand: MEDLINE

## (undated) DEVICE — SUTURE VCRL SZ 2-0 L18IN ABSRB UD CT-1 L36MM 1/2 CIR J839D

## (undated) DEVICE — 3.0MM NEURO (MATCH HEAD) SOFT TOUCH

## (undated) DEVICE — PENCIL ES CRD L10FT HND SWCHING ROCK SWCH W/ EDGE COAT BLDE

## (undated) DEVICE — 6619 2 PTNT ISO SYS INCISE AREA&LT;(&GT;&&LT;)&GT;P: Brand: STERI-DRAPE™ IOBAN™ 2

## (undated) DEVICE — CARBIDE MATCH HEAD

## (undated) DEVICE — GLOVE SURG SZ 85 L1185IN FNGR THK75MIL STRW LTX POLYMER

## (undated) DEVICE — DRAPE MICSCP W54XL150IN W/ 4 BINOC GLS LENS LEICA

## (undated) DEVICE — TUBING, SUCTION, 9/32" X 10', STRAIGHT: Brand: MEDLINE

## (undated) DEVICE — AGENT HEMOSTATIC SURGIFLOW MATRIX KIT W/THROMBIN

## (undated) DEVICE — ELECTRODE ES L4IN PTFE INSUL BLDE W/ SFTY SL DISP EDGE

## (undated) DEVICE — AGENT HEMSTAT W4XL4IN OXIDIZED REGENERATED CELOS ABSRB SFT

## (undated) DEVICE — MARKER SURG SKIN UTIL REGULAR/FINE 2 TIP W/ RUL AND 9 LBL

## (undated) DEVICE — BLADE SURG NO 11 CONVENTIONAL STD UNPROTECTED W/O HNDL S

## (undated) DEVICE — C-ARMOR C-ARM EQUIPMENT COVERS CLEAR STERILE UNIVERSAL FIT 12 PER CASE: Brand: C-ARMOR

## (undated) DEVICE — CONTAINER,SPECIMEN,4OZ,OR STRL: Brand: MEDLINE

## (undated) DEVICE — GLOVE SURG SZ 65 L12IN FNGR THK79MIL GRN LTX FREE

## (undated) DEVICE — SYRINGE MED 30ML STD CLR PLAS LUERLOCK TIP N CTRL DISP

## (undated) DEVICE — TOWEL,OR,DSP,ST,BLUE,STD,6/PK,12PK/CS: Brand: MEDLINE

## (undated) DEVICE — GAUZE,SPONGE,4"X4",16PLY,XRAY,STRL,LF: Brand: MEDLINE

## (undated) DEVICE — DRAPE C-ARM 267CM X 152CM

## (undated) DEVICE — FORCEPS BX L240CM JAW DIA2.8MM L CAP W/ NDL MIC MESH TOOTH

## (undated) DEVICE — 3M™ STERI-DRAPE™ INSTRUMENT POUCH 1018: Brand: STERI-DRAPE™

## (undated) DEVICE — UNDERGLOVE SURG SZ 8.5 FNGR THK0.21MIL GRN LTX BEAD CUF

## (undated) DEVICE — 5.0MM PRECISION ROUND

## (undated) DEVICE — YANKAUER,FLEXIBLE HANDLE,REGLR CAPACITY: Brand: MEDLINE INDUSTRIES, INC.

## (undated) DEVICE — KIT JACK TBL PT CARE

## (undated) DEVICE — GUIDEWIRE 75700450 BLUNT NITINOL 450MM

## (undated) DEVICE — GOWN,ECLIPSE,POLYRNF,BRTHSLV,XL,30/CS: Brand: MEDLINE

## (undated) DEVICE — SOLUTION IRRIG 1000ML 0.9% SOD CHL USP POUR PLAS BTL